# Patient Record
Sex: MALE | Race: WHITE | NOT HISPANIC OR LATINO | Employment: OTHER | URBAN - METROPOLITAN AREA
[De-identification: names, ages, dates, MRNs, and addresses within clinical notes are randomized per-mention and may not be internally consistent; named-entity substitution may affect disease eponyms.]

---

## 2017-04-28 ENCOUNTER — OFFICE VISIT (OUTPATIENT)
Dept: FAMILY MEDICINE CLINIC | Facility: CLINIC | Age: 67
End: 2017-04-28

## 2017-04-28 VITALS
OXYGEN SATURATION: 98 % | BODY MASS INDEX: 29.26 KG/M2 | TEMPERATURE: 99 F | WEIGHT: 216 LBS | HEIGHT: 72 IN | HEART RATE: 83 BPM | DIASTOLIC BLOOD PRESSURE: 90 MMHG | RESPIRATION RATE: 18 BRPM | SYSTOLIC BLOOD PRESSURE: 142 MMHG

## 2017-04-28 DIAGNOSIS — N20.1 URETEROLITHIASIS: ICD-10-CM

## 2017-04-28 DIAGNOSIS — N40.1 BENIGN NODULAR PROSTATIC HYPERPLASIA WITH LOWER URINARY TRACT SYMPTOMS: ICD-10-CM

## 2017-04-28 DIAGNOSIS — I10 ESSENTIAL HYPERTENSION: Primary | ICD-10-CM

## 2017-04-28 DIAGNOSIS — M15.9 OSTEOARTHRITIS OF MULTIPLE JOINTS, UNSPECIFIED OSTEOARTHRITIS TYPE: ICD-10-CM

## 2017-04-28 DIAGNOSIS — E78.5 DYSLIPIDEMIA: ICD-10-CM

## 2017-04-28 DIAGNOSIS — Z00.00 MEDICARE ANNUAL WELLNESS VISIT, INITIAL: ICD-10-CM

## 2017-04-28 PROBLEM — N40.0 BENIGN NODULAR PROSTATIC HYPERPLASIA: Status: ACTIVE | Noted: 2017-04-28

## 2017-04-28 PROCEDURE — 99214 OFFICE O/P EST MOD 30 MIN: CPT | Performed by: INTERNAL MEDICINE

## 2017-04-28 PROCEDURE — G0402 INITIAL PREVENTIVE EXAM: HCPCS | Performed by: INTERNAL MEDICINE

## 2017-04-28 RX ORDER — LISINOPRIL 20 MG/1
20 TABLET ORAL DAILY
Qty: 30 TABLET | Refills: 3 | Status: SHIPPED | OUTPATIENT
Start: 2017-04-28 | End: 2017-05-18 | Stop reason: SDUPTHER

## 2017-04-28 NOTE — PATIENT INSTRUCTIONS
"DASH Eating Plan  DASH stands for \"Dietary Approaches to Stop Hypertension.\" The DASH eating plan is a healthy eating plan that has been shown to reduce high blood pressure (hypertension). Additional health benefits may include reducing the risk of type 2 diabetes mellitus, heart disease, and stroke. The DASH eating plan may also help with weight loss.  WHAT DO I NEED TO KNOW ABOUT THE DASH EATING PLAN?  For the DASH eating plan, you will follow these general guidelines:  · Choose foods with a percent daily value for sodium of less than 5% (as listed on the food label).  · Use salt-free seasonings or herbs instead of table salt or sea salt.  · Check with your health care provider or pharmacist before using salt substitutes.  · Eat lower-sodium products, often labeled as \"lower sodium\" or \"no salt added.\"  · Eat fresh foods.  · Eat more vegetables, fruits, and low-fat dairy products.  · Choose whole grains. Look for the word \"whole\" as the first word in the ingredient list.  · Choose fish and skinless chicken or turkey more often than red meat. Limit fish, poultry, and meat to 6 oz (170 g) each day.  · Limit sweets, desserts, sugars, and sugary drinks.  · Choose heart-healthy fats.  · Limit cheese to 1 oz (28 g) per day.  · Eat more home-cooked food and less restaurant, buffet, and fast food.  · Limit fried foods.  · Cook foods using methods other than frying.  · Limit canned vegetables. If you do use them, rinse them well to decrease the sodium.  · When eating at a restaurant, ask that your food be prepared with less salt, or no salt if possible.  WHAT FOODS CAN I EAT?  Seek help from a dietitian for individual calorie needs.  Grains  Whole grain or whole wheat bread. Brown rice. Whole grain or whole wheat pasta. Quinoa, bulgur, and whole grain cereals. Low-sodium cereals. Corn or whole wheat flour tortillas. Whole grain cornbread. Whole grain crackers. Low-sodium crackers.  Vegetables  Fresh or frozen vegetables " (raw, steamed, roasted, or grilled). Low-sodium or reduced-sodium tomato and vegetable juices. Low-sodium or reduced-sodium tomato sauce and paste. Low-sodium or reduced-sodium canned vegetables.   Fruits  All fresh, canned (in natural juice), or frozen fruits.  Meat and Other Protein Products  Ground beef (85% or leaner), grass-fed beef, or beef trimmed of fat. Skinless chicken or turkey. Ground chicken or turkey. Pork trimmed of fat. All fish and seafood. Eggs. Dried beans, peas, or lentils. Unsalted nuts and seeds. Unsalted canned beans.  Dairy  Low-fat dairy products, such as skim or 1% milk, 2% or reduced-fat cheeses, low-fat ricotta or cottage cheese, or plain low-fat yogurt. Low-sodium or reduced-sodium cheeses.  Fats and Oils  Tub margarines without trans fats. Light or reduced-fat mayonnaise and salad dressings (reduced sodium). Avocado. Safflower, olive, or canola oils. Natural peanut or almond butter.  Other  Unsalted popcorn and pretzels.  The items listed above may not be a complete list of recommended foods or beverages. Contact your dietitian for more options.  WHAT FOODS ARE NOT RECOMMENDED?  Grains  White bread. White pasta. White rice. Refined cornbread. Bagels and croissants. Crackers that contain trans fat.  Vegetables  Creamed or fried vegetables. Vegetables in a cheese sauce. Regular canned vegetables. Regular canned tomato sauce and paste. Regular tomato and vegetable juices.  Fruits  Dried fruits. Canned fruit in light or heavy syrup. Fruit juice.  Meat and Other Protein Products  Fatty cuts of meat. Ribs, chicken wings, jurado, sausage, bologna, salami, chitterlings, fatback, hot dogs, bratwurst, and packaged luncheon meats. Salted nuts and seeds. Canned beans with salt.  Dairy  Whole or 2% milk, cream, half-and-half, and cream cheese. Whole-fat or sweetened yogurt. Full-fat cheeses or blue cheese. Nondairy creamers and whipped toppings. Processed cheese, cheese spreads, or cheese  curds.  Condiments  Onion and garlic salt, seasoned salt, table salt, and sea salt. Canned and packaged gravies. Worcestershire sauce. Tartar sauce. Barbecue sauce. Teriyaki sauce. Soy sauce, including reduced sodium. Steak sauce. Fish sauce. Oyster sauce. Cocktail sauce. Horseradish. Ketchup and mustard. Meat flavorings and tenderizers. Bouillon cubes. Hot sauce. Tabasco sauce. Marinades. Taco seasonings. Relishes.  Fats and Oils  Butter, stick margarine, lard, shortening, ghee, and jurado fat. Coconut, palm kernel, or palm oils. Regular salad dressings.  Other  Pickles and olives. Salted popcorn and pretzels.  The items listed above may not be a complete list of foods and beverages to avoid. Contact your dietitian for more information.  WHERE CAN I FIND MORE INFORMATION?  [employer]: www.nhlbi.nih.gov/health/health-topics/topics/dash/     This information is not intended to replace advice given to you by your health care provider. Make sure you discuss any questions you have with your health care provider.     Document Released: 2012 Document Revised: 2016 Document Reviewed: 10/22/2014  [employer] Interactive Patient Education © [employer]    Medicare Wellness  Personal Prevention Plan of Service     Date of Office Visit:  2017  Encounter Provider:  Smith Cabello MD  Place of Service:  [facility]  Patient Name: Jose Luis Davis  :  1950    As part of the Medicare Wellness portion of your visit today, we are providing you with this personalized preventive plan of services (PPPS). This plan is based upon recommendations of the [place] Preventive Services Task Force (USPSTF) and the Advisory Committee on Immunization Practices (ACIP).    This lists the preventive care services that should be considered, and provides dates of when you are due. Items listed as completed are up-to-date and do not require any  further intervention.    Health Maintenance   Topic Date Due   • HEPATITIS C SCREENING  04/28/2017   • AAA SCREEN (ONE-TIME)  04/28/2017   • PNEUMOCOCCAL VACCINES (65+ LOW/MEDIUM RISK) (2 of 2 - PPSV23) 04/28/2018   • COLONOSCOPY  04/28/2027   • TDAP/TD VACCINES (2 - Td) 04/28/2027   • INFLUENZA VACCINE  Completed   • ZOSTER VACCINE  Addressed       No orders of the defined types were placed in this encounter.      No Follow-up on file.

## 2017-04-28 NOTE — PROGRESS NOTES
Subjective   Jose Luis Davis is a 66 y.o. male.     History of Present Illness   Patient was then for Medicare annual wellness visit.  He also had trouble with his blood pressure and it's been running high and his urologist office.  He was getting 150-140/90.  His been going on for about 2 months.  He was given a DASH diet  Low impact exercise 30 minutes 3-4 times a week.  He will check his blood pressure and return to our clinic in 1 month.  He was also placed on lisinopril.    Much of this encounter note is an electronic transcription/translation of spoken language to printed text.  The electronic translation of spoken language may permit erroneous, or at times, nonsensical words or phrases to be inadvertently transcribed.  Although I have reviewed the note for such errors, some may still exist.  The following portions of the patient's history were reviewed and updated as appropriate: allergies, current medications, past family history, past medical history, past social history, past surgical history and problem list.    Review of Systems   Constitutional: Negative for fatigue and fever.   HENT: Positive for congestion. Negative for trouble swallowing.    Eyes: Negative for discharge and visual disturbance.   Respiratory: Negative for choking and shortness of breath.    Cardiovascular: Negative for chest pain and palpitations.   Gastrointestinal: Negative for abdominal pain and blood in stool.   Endocrine: Negative.    Genitourinary: Negative for genital sores and hematuria.   Musculoskeletal: Negative for gait problem and joint swelling.   Skin: Negative for color change, pallor, rash and wound.   Allergic/Immunologic: Positive for environmental allergies. Negative for immunocompromised state.   Neurological: Negative for facial asymmetry and speech difficulty.   Psychiatric/Behavioral: Negative for hallucinations and suicidal ideas.       Objective   Physical Exam   Constitutional: He is oriented to person,  place, and time. He appears well-developed and well-nourished. No distress.   HENT:   Head: Normocephalic and atraumatic.   Right Ear: External ear normal.   Left Ear: External ear normal.   Nose: Nose normal.   Mouth/Throat: Oropharynx is clear and moist. No oropharyngeal exudate.   Eyes: Conjunctivae and EOM are normal. Pupils are equal, round, and reactive to light. Right eye exhibits no discharge. Left eye exhibits no discharge. No scleral icterus.   Neck: Normal range of motion. Neck supple. No JVD present. No tracheal deviation present. No thyromegaly present.   Cardiovascular: Normal rate, regular rhythm and normal heart sounds.  Exam reveals no gallop and no friction rub.    No murmur heard.  Pulmonary/Chest: Effort normal and breath sounds normal. No stridor. No respiratory distress. He has no wheezes. He has no rales. He exhibits no tenderness.   Abdominal: Soft. Bowel sounds are normal. He exhibits no distension and no mass. There is no tenderness. There is no rebound and no guarding. No hernia.   Musculoskeletal: Normal range of motion. He exhibits no edema, tenderness or deformity.   Lymphadenopathy:     He has no cervical adenopathy.   Neurological: He is alert and oriented to person, place, and time. He displays normal reflexes. No cranial nerve deficit. He exhibits normal muscle tone. Coordination normal.   Skin: Skin is warm and dry. No rash noted. He is not diaphoretic. No erythema. No pallor.   Psychiatric: He has a normal mood and affect. His behavior is normal. Judgment and thought content normal.   Nursing note and vitals reviewed.      Assessment/Plan   Problems Addressed this Visit        Cardiovascular and Mediastinum    Essential hypertension - Primary    Relevant Medications    lisinopril (PRINIVIL,ZESTRIL) 20 MG tablet    Other Relevant Orders    CBC & Differential    Comprehensive Metabolic Panel    Lipid Panel    PSA       Musculoskeletal and Integument    Osteoarthritis of multiple  joints    Relevant Orders    CBC & Differential    Comprehensive Metabolic Panel    Lipid Panel    PSA       Genitourinary    Ureterolithiasis    Relevant Orders    CBC & Differential    Comprehensive Metabolic Panel    Lipid Panel    PSA    Benign nodular prostatic hyperplasia    Relevant Orders    CBC & Differential    Comprehensive Metabolic Panel    Lipid Panel    PSA       Other    Dyslipidemia    Relevant Orders    CBC & Differential    Comprehensive Metabolic Panel    Lipid Panel    PSA      Other Visit Diagnoses     Medicare annual wellness visit, initial

## 2017-04-28 NOTE — PROGRESS NOTES
QUICK REFERENCE INFORMATION:  The ABCs of the Annual Wellness Visit    Initial Medicare Wellness Visit    HEALTH RISK ASSESSMENT    1950    Recent Hospitalizations:  No recent hospitalization(s)..        Current Medical Providers:  Patient Care Team:  No Known Provider as PCP - General  Nathan Freeman Jr., MD as Consulting Physician (Urology)        Smoking Status:  History   Smoking Status   • Former Smoker   Smokeless Tobacco   • Not on file     Comment: quit about 30 yrs ago       Alcohol Consumption:  History   Alcohol Use   • 3.6 oz/week   • 6 Cans of beer per week       Depression Screen:   PHQ-9 Depression Screening 4/28/2017   Little interest or pleasure in doing things 0   Feeling down, depressed, or hopeless 0   Trouble falling or staying asleep, or sleeping too much 0   Feeling tired or having little energy 0   Poor appetite or overeating 0   Feeling bad about yourself - or that you are a failure or have let yourself or your family down 0   Trouble concentrating on things, such as reading the newspaper or watching television 0   Moving or speaking so slowly that other people could have noticed. Or the opposite - being so fidgety or restless that you have been moving around a lot more than usual 0   Thoughts that you would be better off dead, or of hurting yourself in some way 0   PHQ-9 Total Score 0   If you checked off any problems, how difficult have these problems made it for you to do your work, take care of things at home, or get along with other people? Not difficult at all       Health Habits and Functional and Cognitive Screening:  Functional & Cognitive Status 4/28/2017   Do you have difficulty preparing food and eating? No   Do you have difficulty bathing yourself? No   Do you have difficulty getting dressed? No   Do you have difficulty using the toilet? No   Do you have difficulty moving around from place to place? No   In the past year have you fallen or experienced a near fall? No   Do  you need help using the phone?  No   Are you deaf or do you have serious difficulty hearing?  No   Do you need help with transportation? No   Do you need help shopping? No   Do you need help preparing meals?  No   Do you need help with housework?  No   Do you need help with laundry? No   Do you need help taking your medications? No   Do you need help managing money? No   Do you have difficulty concentrating, remembering or making decisions? No       Health Habits  Current Diet: Well Balanced Diet  Dental Exam: Up to date  Eye Exam: Up to date  Exercise (times per week): 3 times per week  Current Exercise Activities Include: Light Weight/Kettebells          Does the patient have evidence of cognitive impairment? No    Asiprin use counseling: Does not need ASA (and currently is not on it)      Recent Lab Results:    Visual Acuity:  No exam data present    Age-appropriate Screening Schedule:  Refer to the list below for future screening recommendations based on patient's age, sex and/or medical conditions. Orders for these recommended tests are listed in the plan section. The patient has been provided with a written plan.    Health Maintenance   Topic Date Due   • PNEUMOCOCCAL VACCINES (65+ LOW/MEDIUM RISK) (2 of 2 - PPSV23) 04/28/2018   • COLONOSCOPY  04/28/2027   • TDAP/TD VACCINES (2 - Td) 04/28/2027   • INFLUENZA VACCINE  Completed   • ZOSTER VACCINE  Addressed        Subjective   History of Present Illness    Jose Luis Davis is a 66 y.o. male who presents for an Annual Wellness Visit.    The following portions of the patient's history were reviewed and updated as appropriate: allergies, current medications, past family history, past medical history, past social history, past surgical history and problem list.    Outpatient Medications Prior to Visit   Medication Sig Dispense Refill   • Multiple Vitamins-Minerals (MULTIVITAMIN ADULT PO) Take 1 tablet/day by mouth.     • saw palmetto 80 MG capsule Take 80 mg by  "mouth daily.     • HYDROcodone-acetaminophen (NORCO) 5-325 MG per tablet Take 1 tablet by mouth every 6 (six) hours as needed.     • tamsulosin (FLOMAX) 0.4 MG capsule 24 hr capsule Take 1 capsule by mouth every night. 30 capsule 0     No facility-administered medications prior to visit.        Patient Active Problem List   Diagnosis   • Ureterolithiasis   • Benign nodular prostatic hyperplasia   • Essential hypertension   • Dyslipidemia   • Osteoarthritis of multiple joints       Advance Care Planning:  has an advance directive - a copy HAS NOT been provided    Identification of Risk Factors:  Risk factors include:     Review of Systems    Compared to one year ago, the patient feels his physical health is the same.  Compared to one year ago, the patient feels his mental health is the same.    Objective     Physical Exam    Vitals:    04/28/17 1352   BP: 142/90   BP Location: Left arm   Patient Position: Sitting   Cuff Size: Large Adult   Pulse: 83   Resp: 18   Temp: 99 °F (37.2 °C)   TempSrc: Oral   SpO2: 98%   Weight: 216 lb (98 kg)   Height: 72\" (182.9 cm)   PainSc: 0-No pain       Body mass index is 29.29 kg/(m^2).  Discussed the patient's BMI with him. The BMI is in the acceptable range.    Assessment/Plan   Patient Self-Management and Personalized Health Advice  The patient has been provided with information about: NA and preventive services including:   · NA.    Visit Diagnoses:    ICD-10-CM ICD-9-CM   1. Medicare annual wellness visit, initial Z00.00 V70.0   2. Ureterolithiasis N20.1 592.1   3. Benign nodular prostatic hyperplasia with lower urinary tract symptoms N40.1 600.10   4. Essential hypertension I10 401.9   5. Dyslipidemia E78.5 272.4   6. Osteoarthritis of multiple joints, unspecified osteoarthritis type M15.9 715.89       Orders Placed This Encounter   Procedures   • Comprehensive Metabolic Panel   • Lipid Panel   • PSA       Outpatient Encounter Prescriptions as of 4/28/2017   Medication Sig " Dispense Refill   • Multiple Vitamins-Minerals (MULTIVITAMIN ADULT PO) Take 1 tablet/day by mouth.     • saw palmetto 80 MG capsule Take 80 mg by mouth daily.     • HYDROcodone-acetaminophen (NORCO) 5-325 MG per tablet Take 1 tablet by mouth every 6 (six) hours as needed.     • lisinopril (PRINIVIL,ZESTRIL) 20 MG tablet Take 1 tablet by mouth Daily. 30 tablet 3   • tamsulosin (FLOMAX) 0.4 MG capsule 24 hr capsule Take 1 capsule by mouth every night. 30 capsule 0     No facility-administered encounter medications on file as of 4/28/2017.        Reviewed use of high risk medication in the elderly: not applicable  Reviewed for potential of harmful drug interactions in the elderly: not applicable    Follow Up:  No Follow-up on file.     An After Visit Summary and PPPS with all of these plans were given to the patient.

## 2017-05-18 RX ORDER — LISINOPRIL 20 MG/1
20 TABLET ORAL DAILY
Qty: 30 TABLET | Refills: 3 | Status: SHIPPED | OUTPATIENT
Start: 2017-05-18 | End: 2017-08-08 | Stop reason: SDUPTHER

## 2017-05-26 ENCOUNTER — OFFICE VISIT (OUTPATIENT)
Dept: FAMILY MEDICINE CLINIC | Facility: CLINIC | Age: 67
End: 2017-05-26

## 2017-05-26 VITALS
TEMPERATURE: 98 F | SYSTOLIC BLOOD PRESSURE: 122 MMHG | HEIGHT: 72 IN | BODY MASS INDEX: 28.44 KG/M2 | WEIGHT: 210 LBS | OXYGEN SATURATION: 99 % | RESPIRATION RATE: 15 BRPM | DIASTOLIC BLOOD PRESSURE: 80 MMHG | HEART RATE: 90 BPM

## 2017-05-26 DIAGNOSIS — I10 ESSENTIAL HYPERTENSION: Primary | ICD-10-CM

## 2017-05-26 DIAGNOSIS — E78.5 DYSLIPIDEMIA: ICD-10-CM

## 2017-05-26 DIAGNOSIS — M15.9 OSTEOARTHRITIS OF MULTIPLE JOINTS, UNSPECIFIED OSTEOARTHRITIS TYPE: ICD-10-CM

## 2017-05-26 LAB
ALBUMIN SERPL-MCNC: 4.8 G/DL (ref 3.5–5.2)
ALBUMIN/GLOB SERPL: 1.3 G/DL
ALP SERPL-CCNC: 94 U/L (ref 39–117)
ALT SERPL W P-5'-P-CCNC: 26 U/L (ref 1–41)
ANION GAP SERPL CALCULATED.3IONS-SCNC: 13.8 MMOL/L
AST SERPL-CCNC: 15 U/L (ref 1–40)
BASOPHILS # BLD AUTO: 0.01 10*3/MM3 (ref 0–0.2)
BASOPHILS NFR BLD AUTO: 0.2 % (ref 0–1.5)
BILIRUB SERPL-MCNC: 0.5 MG/DL (ref 0.1–1.2)
BUN BLD-MCNC: 20 MG/DL (ref 8–23)
BUN/CREAT SERPL: 15.3 (ref 7–25)
CALCIUM SPEC-SCNC: 10.6 MG/DL (ref 8.6–10.5)
CHLORIDE SERPL-SCNC: 99 MMOL/L (ref 98–107)
CHOLEST SERPL-MCNC: 198 MG/DL (ref 0–200)
CO2 SERPL-SCNC: 24.2 MMOL/L (ref 22–29)
CREAT BLD-MCNC: 1.31 MG/DL (ref 0.76–1.27)
DEPRECATED RDW RBC AUTO: 43.1 FL (ref 37–54)
EOSINOPHIL # BLD AUTO: 0.05 10*3/MM3 (ref 0–0.7)
EOSINOPHIL NFR BLD AUTO: 0.8 % (ref 0.3–6.2)
ERYTHROCYTE [DISTWIDTH] IN BLOOD BY AUTOMATED COUNT: 12.6 % (ref 11.5–14.5)
GFR SERPL CREATININE-BSD FRML MDRD: 55 ML/MIN/1.73
GLOBULIN UR ELPH-MCNC: 3.7 GM/DL
GLUCOSE BLD-MCNC: 101 MG/DL (ref 65–99)
HCT VFR BLD AUTO: 42.5 % (ref 40.4–52.2)
HDLC SERPL-MCNC: 45 MG/DL (ref 40–60)
HGB BLD-MCNC: 14.8 G/DL (ref 13.7–17.6)
IMM GRANULOCYTES # BLD: 0 10*3/MM3 (ref 0–0.03)
IMM GRANULOCYTES NFR BLD: 0 % (ref 0–0.5)
LDLC SERPL CALC-MCNC: 135 MG/DL (ref 0–100)
LDLC/HDLC SERPL: 3 {RATIO}
LYMPHOCYTES # BLD AUTO: 2.27 10*3/MM3 (ref 0.9–4.8)
LYMPHOCYTES NFR BLD AUTO: 34.7 % (ref 19.6–45.3)
MCH RBC QN AUTO: 32.8 PG (ref 27–32.7)
MCHC RBC AUTO-ENTMCNC: 34.8 G/DL (ref 32.6–36.4)
MCV RBC AUTO: 94.2 FL (ref 79.8–96.2)
MONOCYTES # BLD AUTO: 0.43 10*3/MM3 (ref 0.2–1.2)
MONOCYTES NFR BLD AUTO: 6.6 % (ref 5–12)
NEUTROPHILS # BLD AUTO: 3.78 10*3/MM3 (ref 1.9–8.1)
NEUTROPHILS NFR BLD AUTO: 57.7 % (ref 42.7–76)
PLATELET # BLD AUTO: 299 10*3/MM3 (ref 140–500)
PMV BLD AUTO: 10.8 FL (ref 6–12)
POTASSIUM BLD-SCNC: 5.4 MMOL/L (ref 3.5–5.2)
PROT SERPL-MCNC: 8.5 G/DL (ref 6–8.5)
PSA SERPL-MCNC: 4.06 NG/ML (ref 0–4)
RBC # BLD AUTO: 4.51 10*6/MM3 (ref 4.6–6)
SODIUM BLD-SCNC: 137 MMOL/L (ref 136–145)
TRIGL SERPL-MCNC: 91 MG/DL (ref 0–150)
VLDLC SERPL-MCNC: 18.2 MG/DL (ref 5–40)
WBC NRBC COR # BLD: 6.54 10*3/MM3 (ref 4.5–10.7)

## 2017-05-26 PROCEDURE — 99214 OFFICE O/P EST MOD 30 MIN: CPT | Performed by: INTERNAL MEDICINE

## 2017-05-26 PROCEDURE — 84153 ASSAY OF PSA TOTAL: CPT | Performed by: INTERNAL MEDICINE

## 2017-05-26 PROCEDURE — 80053 COMPREHEN METABOLIC PANEL: CPT | Performed by: INTERNAL MEDICINE

## 2017-05-26 PROCEDURE — 85025 COMPLETE CBC W/AUTO DIFF WBC: CPT | Performed by: INTERNAL MEDICINE

## 2017-05-26 PROCEDURE — 80061 LIPID PANEL: CPT | Performed by: INTERNAL MEDICINE

## 2017-08-08 RX ORDER — LISINOPRIL 20 MG/1
20 TABLET ORAL DAILY
Qty: 30 TABLET | Refills: 3 | Status: SHIPPED | OUTPATIENT
Start: 2017-08-08 | End: 2017-11-28 | Stop reason: SDUPTHER

## 2017-10-10 ENCOUNTER — CLINICAL SUPPORT (OUTPATIENT)
Dept: FAMILY MEDICINE CLINIC | Facility: CLINIC | Age: 67
End: 2017-10-10

## 2017-10-10 PROCEDURE — 90662 IIV NO PRSV INCREASED AG IM: CPT | Performed by: INTERNAL MEDICINE

## 2017-10-10 PROCEDURE — G0008 ADMIN INFLUENZA VIRUS VAC: HCPCS | Performed by: INTERNAL MEDICINE

## 2017-11-28 ENCOUNTER — OFFICE VISIT (OUTPATIENT)
Dept: FAMILY MEDICINE CLINIC | Facility: CLINIC | Age: 67
End: 2017-11-28

## 2017-11-28 VITALS
HEART RATE: 77 BPM | BODY MASS INDEX: 28.47 KG/M2 | TEMPERATURE: 98.6 F | HEIGHT: 72 IN | OXYGEN SATURATION: 98 % | RESPIRATION RATE: 16 BRPM | SYSTOLIC BLOOD PRESSURE: 110 MMHG | WEIGHT: 210.2 LBS | DIASTOLIC BLOOD PRESSURE: 76 MMHG

## 2017-11-28 DIAGNOSIS — I10 ESSENTIAL HYPERTENSION: Primary | ICD-10-CM

## 2017-11-28 DIAGNOSIS — M15.9 OSTEOARTHRITIS OF MULTIPLE JOINTS, UNSPECIFIED OSTEOARTHRITIS TYPE: ICD-10-CM

## 2017-11-28 DIAGNOSIS — E78.5 DYSLIPIDEMIA: ICD-10-CM

## 2017-11-28 PROCEDURE — 99214 OFFICE O/P EST MOD 30 MIN: CPT | Performed by: INTERNAL MEDICINE

## 2017-11-28 RX ORDER — LISINOPRIL 20 MG/1
20 TABLET ORAL DAILY
Qty: 90 TABLET | Refills: 2 | Status: SHIPPED | OUTPATIENT
Start: 2017-11-28 | End: 2018-05-29 | Stop reason: SDUPTHER

## 2017-11-28 NOTE — PROGRESS NOTES
Subjective   Jose Luis Davis is a 67 y.o. male.     History of Present Illness   She being seen for hypertension.  Blood pressures been running 110s over 80s at home.  His continue his DASH diet and low impact exercise and follow-up in 6 months.  His osteoarthritis been well-controlled with over-the-counter medication.  His dyslipidemia also being controlled with diet and exercise.    Much of this encounter note is an electronic transcription/translation of spoken language to printed text.  The electronic translation of spoken language may permit erroneous, or at times, nonsensical words or phrases to be inadvertently transcribed.  Although I have reviewed the note for such errors, some may still exist.  The following portions of the patient's history were reviewed and updated as appropriate: allergies, current medications, past family history, past medical history, past social history, past surgical history and problem list.    Review of Systems   Constitutional: Negative for fatigue and fever.   HENT: Positive for congestion. Negative for trouble swallowing.    Eyes: Negative for discharge and visual disturbance.   Respiratory: Negative for choking and shortness of breath.    Cardiovascular: Negative for chest pain and palpitations.   Gastrointestinal: Negative for abdominal pain and blood in stool.   Endocrine: Negative.    Genitourinary: Negative for genital sores and hematuria.   Musculoskeletal: Negative for gait problem and joint swelling.   Skin: Negative for color change, pallor, rash and wound.   Allergic/Immunologic: Positive for environmental allergies. Negative for immunocompromised state.   Neurological: Negative for facial asymmetry and speech difficulty.   Psychiatric/Behavioral: Negative for hallucinations and suicidal ideas.       Objective   Physical Exam   Constitutional: He is oriented to person, place, and time. He appears well-developed and well-nourished.   HENT:   Head: Normocephalic.    Eyes: Conjunctivae are normal. Pupils are equal, round, and reactive to light.   Neck: Normal range of motion. Neck supple.   Cardiovascular: Normal rate, regular rhythm and normal heart sounds.    Pulmonary/Chest: Effort normal and breath sounds normal.   Abdominal: Soft. Bowel sounds are normal.   Musculoskeletal: Normal range of motion.   Neurological: He is alert and oriented to person, place, and time.   Skin: Skin is warm and dry.   Psychiatric: He has a normal mood and affect. His behavior is normal. Judgment and thought content normal.   Nursing note and vitals reviewed.      Assessment/Plan   Problems Addressed this Visit        Cardiovascular and Mediastinum    Essential hypertension - Primary    Relevant Medications    lisinopril (PRINIVIL,ZESTRIL) 20 MG tablet    Other Relevant Orders    CBC & Differential    Comprehensive Metabolic Panel    Lipid Panel       Musculoskeletal and Integument    Osteoarthritis of multiple joints       Other    Dyslipidemia

## 2018-05-22 ENCOUNTER — LAB (OUTPATIENT)
Dept: FAMILY MEDICINE CLINIC | Facility: CLINIC | Age: 68
End: 2018-05-22

## 2018-05-22 DIAGNOSIS — I10 ESSENTIAL HYPERTENSION: ICD-10-CM

## 2018-05-22 LAB
ALBUMIN SERPL-MCNC: 4.5 G/DL (ref 3.5–5.2)
ALBUMIN/GLOB SERPL: 1.8 G/DL
ALP SERPL-CCNC: 71 U/L (ref 39–117)
ALT SERPL W P-5'-P-CCNC: 17 U/L (ref 1–41)
ANION GAP SERPL CALCULATED.3IONS-SCNC: 15.1 MMOL/L
AST SERPL-CCNC: 11 U/L (ref 1–40)
BILIRUB SERPL-MCNC: 0.7 MG/DL (ref 0.1–1.2)
BUN BLD-MCNC: 23 MG/DL (ref 8–23)
BUN/CREAT SERPL: 20 (ref 7–25)
CALCIUM SPEC-SCNC: 9.8 MG/DL (ref 8.6–10.5)
CHLORIDE SERPL-SCNC: 101 MMOL/L (ref 98–107)
CHOLEST SERPL-MCNC: 212 MG/DL (ref 0–200)
CO2 SERPL-SCNC: 23.9 MMOL/L (ref 22–29)
CREAT BLD-MCNC: 1.15 MG/DL (ref 0.76–1.27)
ERYTHROCYTE [DISTWIDTH] IN BLOOD BY AUTOMATED COUNT: 12.9 % (ref 4.5–15)
GFR SERPL CREATININE-BSD FRML MDRD: 64 ML/MIN/1.73
GLOBULIN UR ELPH-MCNC: 2.5 GM/DL
GLUCOSE BLD-MCNC: 103 MG/DL (ref 65–99)
HCT VFR BLD AUTO: 43.4 % (ref 35–60)
HDLC SERPL-MCNC: 39 MG/DL (ref 40–60)
HGB BLD-MCNC: 14.8 G/DL (ref 13.5–18)
LDLC SERPL CALC-MCNC: 151 MG/DL (ref 0–100)
LDLC/HDLC SERPL: 3.87 {RATIO}
LYMPHOCYTES # BLD AUTO: 3.7 10*3/MM3 (ref 1.2–3.4)
LYMPHOCYTES NFR BLD AUTO: 45.1 % (ref 21–51)
MCH RBC QN AUTO: 31.9 PG (ref 26.1–33.1)
MCHC RBC AUTO-ENTMCNC: 34 G/DL (ref 33–37)
MCV RBC AUTO: 93.8 FL (ref 80–99)
MONOCYTES # BLD AUTO: 0.6 10*3/MM3 (ref 0.1–0.6)
MONOCYTES NFR BLD AUTO: 7.3 % (ref 2–9)
NEUTROPHILS # BLD AUTO: 3.9 10*3/MM3 (ref 1.4–6.5)
NEUTROPHILS NFR BLD AUTO: 47.6 % (ref 42–75)
PLATELET # BLD AUTO: 298 10*3/MM3 (ref 150–450)
PMV BLD AUTO: 7.9 FL (ref 7.1–10.5)
POTASSIUM BLD-SCNC: 4.7 MMOL/L (ref 3.5–5.2)
PROT SERPL-MCNC: 7 G/DL (ref 6–8.5)
RBC # BLD AUTO: 4.63 10*6/MM3 (ref 4–6)
SODIUM BLD-SCNC: 140 MMOL/L (ref 136–145)
TRIGL SERPL-MCNC: 111 MG/DL (ref 0–150)
VLDLC SERPL-MCNC: 22.2 MG/DL (ref 5–40)
WBC NRBC COR # BLD: 8.1 10*3/MM3 (ref 4.5–10)

## 2018-05-22 PROCEDURE — 80053 COMPREHEN METABOLIC PANEL: CPT | Performed by: INTERNAL MEDICINE

## 2018-05-22 PROCEDURE — 36415 COLL VENOUS BLD VENIPUNCTURE: CPT | Performed by: INTERNAL MEDICINE

## 2018-05-22 PROCEDURE — 80061 LIPID PANEL: CPT | Performed by: INTERNAL MEDICINE

## 2018-05-22 PROCEDURE — 85025 COMPLETE CBC W/AUTO DIFF WBC: CPT | Performed by: INTERNAL MEDICINE

## 2018-05-29 ENCOUNTER — OFFICE VISIT (OUTPATIENT)
Dept: FAMILY MEDICINE CLINIC | Facility: CLINIC | Age: 68
End: 2018-05-29

## 2018-05-29 VITALS
TEMPERATURE: 98.3 F | HEART RATE: 80 BPM | RESPIRATION RATE: 15 BRPM | HEIGHT: 72 IN | OXYGEN SATURATION: 98 % | SYSTOLIC BLOOD PRESSURE: 122 MMHG | WEIGHT: 213 LBS | BODY MASS INDEX: 28.85 KG/M2 | DIASTOLIC BLOOD PRESSURE: 78 MMHG

## 2018-05-29 DIAGNOSIS — I10 ESSENTIAL HYPERTENSION: ICD-10-CM

## 2018-05-29 DIAGNOSIS — G44.209 TENSION-TYPE HEADACHE, NOT INTRACTABLE, UNSPECIFIED CHRONICITY PATTERN: ICD-10-CM

## 2018-05-29 DIAGNOSIS — M15.9 OSTEOARTHRITIS OF MULTIPLE JOINTS, UNSPECIFIED OSTEOARTHRITIS TYPE: ICD-10-CM

## 2018-05-29 DIAGNOSIS — I79.8 OTHER DISORDERS OF ARTERIES, ARTERIOLES AND CAPILLARIES IN DISEASES CLASSIFIED ELSEWHERE (HCC): ICD-10-CM

## 2018-05-29 DIAGNOSIS — Z00.00 MEDICARE ANNUAL WELLNESS VISIT, INITIAL: Primary | ICD-10-CM

## 2018-05-29 DIAGNOSIS — E55.9 VITAMIN D DEFICIENCY: ICD-10-CM

## 2018-05-29 DIAGNOSIS — E78.5 DYSLIPIDEMIA: ICD-10-CM

## 2018-05-29 PROCEDURE — 99214 OFFICE O/P EST MOD 30 MIN: CPT | Performed by: INTERNAL MEDICINE

## 2018-05-29 PROCEDURE — G0438 PPPS, INITIAL VISIT: HCPCS | Performed by: INTERNAL MEDICINE

## 2018-05-29 RX ORDER — LISINOPRIL 20 MG/1
20 TABLET ORAL DAILY
Qty: 90 TABLET | Refills: 2 | Status: SHIPPED | OUTPATIENT
Start: 2018-05-29 | End: 2018-05-29

## 2018-05-29 RX ORDER — MAG HYDROX/ALUMINUM HYD/SIMETH 400-400-40
SUSPENSION, ORAL (FINAL DOSE FORM) ORAL 2 TIMES DAILY
COMMUNITY

## 2018-05-29 RX ORDER — ASPIRIN 81 MG/1
81 TABLET ORAL DAILY
Qty: 30 TABLET | Refills: 3
Start: 2018-05-29 | End: 2019-12-05

## 2018-05-29 RX ORDER — ROSUVASTATIN CALCIUM 20 MG/1
20 TABLET, COATED ORAL DAILY
Qty: 90 TABLET | Refills: 1 | Status: SHIPPED | OUTPATIENT
Start: 2018-05-29 | End: 2018-12-07 | Stop reason: SDUPTHER

## 2018-05-29 NOTE — PROGRESS NOTES
Subjective   Jose Luis Davis is a 67 y.o. male.     History of Present Illness   She was seen for Medicare wellness exam.  His blood pressures been ranging 110s over 70s at home.  Her osteoarthritis is been well-controlled with over-the-counter medication.  His lipid status showed an LDL of 150.  He was getting a unsteady feeling in his head.  He was unable to explain it totally but his blood pressure and pulse were all normal medications.  His sisters recently undergone carotid endarterectomy and he was scheduled for carotid Doppler with CT scan of the head.  He was placed on a baby aspirin restart Crestor 20 mg.  He is to have labs in one in 3 months.  He is to monitor his blood pressure return to our clinic in 2 weeks.    Dictated utilizing Dragon dictation. If there are questions or for further clarification, please contact me.   The following portions of the patient's history were reviewed and updated as appropriate: allergies, current medications, past family history, past medical history, past social history, past surgical history and problem list.    Review of Systems   Constitutional: Negative for fatigue and fever.   HENT: Positive for congestion. Negative for trouble swallowing.    Eyes: Negative for discharge and visual disturbance.   Respiratory: Negative for choking and shortness of breath.    Cardiovascular: Negative for chest pain and palpitations.   Gastrointestinal: Negative for abdominal pain and blood in stool.   Endocrine: Negative.    Genitourinary: Negative for genital sores and hematuria.   Musculoskeletal: Negative for gait problem and joint swelling.   Skin: Negative for color change, pallor, rash and wound.   Allergic/Immunologic: Positive for environmental allergies. Negative for immunocompromised state.   Neurological: Positive for light-headedness and headaches. Negative for facial asymmetry and speech difficulty.   Psychiatric/Behavioral: Negative for hallucinations and suicidal  ideas.       Objective   Physical Exam   Constitutional: He is oriented to person, place, and time. He appears well-developed and well-nourished.   HENT:   Head: Normocephalic.   Eyes: Conjunctivae are normal. Pupils are equal, round, and reactive to light.   Neck: Normal range of motion. Neck supple.   Cardiovascular: Normal rate, regular rhythm and normal heart sounds.  Exam reveals no gallop and no friction rub.    No murmur heard.  Pulmonary/Chest: Effort normal and breath sounds normal. No respiratory distress. He has no wheezes. He has no rales. He exhibits no tenderness.   Abdominal: Soft. Bowel sounds are normal.   Musculoskeletal: Normal range of motion. He exhibits no edema, tenderness or deformity.   Neurological: He is alert and oriented to person, place, and time. He displays normal reflexes. No cranial nerve deficit or sensory deficit. He exhibits normal muscle tone. Coordination normal.   Skin: Skin is warm and dry.   Psychiatric: He has a normal mood and affect. His behavior is normal. Judgment and thought content normal.   Nursing note and vitals reviewed.      Assessment/Plan   Problems Addressed this Visit        Cardiovascular and Mediastinum    Essential hypertension    Relevant Orders    CBC & Differential    Comprehensive Metabolic Panel    Lipid Panel    Vitamin D 25 Hydroxy    Hepatic Function Panel    Comprehensive Metabolic Panel    Lipid Panel       Musculoskeletal and Integument    Osteoarthritis of multiple joints    Relevant Orders    CBC & Differential    Comprehensive Metabolic Panel    Lipid Panel    Vitamin D 25 Hydroxy    Hepatic Function Panel    Comprehensive Metabolic Panel    Lipid Panel       Other    Dyslipidemia    Relevant Orders    CBC & Differential    Comprehensive Metabolic Panel    Lipid Panel    Vitamin D 25 Hydroxy    Hepatic Function Panel    Comprehensive Metabolic Panel    Lipid Panel      Other Visit Diagnoses     Medicare annual wellness visit, initial    -   Primary    Relevant Orders    Hepatic Function Panel    Comprehensive Metabolic Panel    Lipid Panel    Vitamin D deficiency         Relevant Orders    Vitamin D 25 Hydroxy    Hepatic Function Panel    Comprehensive Metabolic Panel    Lipid Panel    Tension-type headache, not intractable, unspecified chronicity pattern        Relevant Medications    aspirin 81 MG EC tablet    Other Relevant Orders    CT Head Without Contrast    Duplex Carotid Ultrasound CAR    Other disorders of arteries, arterioles and capillaries in diseases classified elsewhere         Relevant Orders    Duplex Carotid Ultrasound CAR

## 2018-05-29 NOTE — PATIENT INSTRUCTIONS
"DASH Eating Plan  DASH stands for \"Dietary Approaches to Stop Hypertension.\" The DASH eating plan is a healthy eating plan that has been shown to reduce high blood pressure (hypertension). It may also reduce your risk for type 2 diabetes, heart disease, and stroke. The DASH eating plan may also help with weight loss.  What are tips for following this plan?  General guidelines   · Avoid eating more than 2,300 mg (milligrams) of salt (sodium) a day. If you have hypertension, you may need to reduce your sodium intake to 1,500 mg a day.  · Limit alcohol intake to no more than 1 drink a day for nonpregnant women and 2 drinks a day for men. One drink equals 12 oz of beer, 5 oz of wine, or 1½ oz of hard liquor.  · Work with your health care provider to maintain a healthy body weight or to lose weight. Ask what an ideal weight is for you.  · Get at least 30 minutes of exercise that causes your heart to beat faster (aerobic exercise) most days of the week. Activities may include walking, swimming, or biking.  · Work with your health care provider or diet and nutrition specialist (dietitian) to adjust your eating plan to your individual calorie needs.  Reading food labels   · Check food labels for the amount of sodium per serving. Choose foods with less than 5 percent of the Daily Value of sodium. Generally, foods with less than 300 mg of sodium per serving fit into this eating plan.  · To find whole grains, look for the word \"whole\" as the first word in the ingredient list.  Shopping   · Buy products labeled as \"low-sodium\" or \"no salt added.\"  · Buy fresh foods. Avoid canned foods and premade or frozen meals.  Cooking   · Avoid adding salt when cooking. Use salt-free seasonings or herbs instead of table salt or sea salt. Check with your health care provider or pharmacist before using salt substitutes.  · Do not pacheco foods. Cook foods using healthy methods such as baking, boiling, grilling, and broiling instead.  · Cook with " heart-healthy oils, such as olive, canola, soybean, or sunflower oil.  Meal planning     · Eat a balanced diet that includes:  ¨ 5 or more servings of fruits and vegetables each day. At each meal, try to fill half of your plate with fruits and vegetables.  ¨ Up to 6-8 servings of whole grains each day.  ¨ Less than 6 oz of lean meat, poultry, or fish each day. A 3-oz serving of meat is about the same size as a deck of cards. One egg equals 1 oz.  ¨ 2 servings of low-fat dairy each day.  ¨ A serving of nuts, seeds, or beans 5 times each week.  ¨ Heart-healthy fats. Healthy fats called Omega-3 fatty acids are found in foods such as flaxseeds and coldwater fish, like sardines, salmon, and mackerel.  · Limit how much you eat of the following:  ¨ Canned or prepackaged foods.  ¨ Food that is high in trans fat, such as fried foods.  ¨ Food that is high in saturated fat, such as fatty meat.  ¨ Sweets, desserts, sugary drinks, and other foods with added sugar.  ¨ Full-fat dairy products.  · Do not salt foods before eating.  · Try to eat at least 2 vegetarian meals each week.  · Eat more home-cooked food and less restaurant, buffet, and fast food.  · When eating at a restaurant, ask that your food be prepared with less salt or no salt, if possible.  What foods are recommended?  The items listed may not be a complete list. Talk with your dietitian about what dietary choices are best for you.  Grains   Whole-grain or whole-wheat bread. Whole-grain or whole-wheat pasta. Brown rice. Oatmeal. Quinoa. Bulgur. Whole-grain and low-sodium cereals. Olga bread. Low-fat, low-sodium crackers. Whole-wheat flour tortillas.  Vegetables   Fresh or frozen vegetables (raw, steamed, roasted, or grilled). Low-sodium or reduced-sodium tomato and vegetable juice. Low-sodium or reduced-sodium tomato sauce and tomato paste. Low-sodium or reduced-sodium canned vegetables.  Fruits   All fresh, dried, or frozen fruit. Canned fruit in natural juice  (without added sugar).  Meat and other protein foods   Skinless chicken or turkey. Ground chicken or turkey. Pork with fat trimmed off. Fish and seafood. Egg whites. Dried beans, peas, or lentils. Unsalted nuts, nut butters, and seeds. Unsalted canned beans. Lean cuts of beef with fat trimmed off. Low-sodium, lean deli meat.  Dairy   Low-fat (1%) or fat-free (skim) milk. Fat-free, low-fat, or reduced-fat cheeses. Nonfat, low-sodium ricotta or cottage cheese. Low-fat or nonfat yogurt. Low-fat, low-sodium cheese.  Fats and oils   Soft margarine without trans fats. Vegetable oil. Low-fat, reduced-fat, or light mayonnaise and salad dressings (reduced-sodium). Canola, safflower, olive, soybean, and sunflower oils. Avocado.  Seasoning and other foods   Herbs. Spices. Seasoning mixes without salt. Unsalted popcorn and pretzels. Fat-free sweets.  What foods are not recommended?  The items listed may not be a complete list. Talk with your dietitian about what dietary choices are best for you.  Grains   Baked goods made with fat, such as croissants, muffins, or some breads. Dry pasta or rice meal packs.  Vegetables   Creamed or fried vegetables. Vegetables in a cheese sauce. Regular canned vegetables (not low-sodium or reduced-sodium). Regular canned tomato sauce and paste (not low-sodium or reduced-sodium). Regular tomato and vegetable juice (not low-sodium or reduced-sodium). Pickles. Olives.  Fruits   Canned fruit in a light or heavy syrup. Fried fruit. Fruit in cream or butter sauce.  Meat and other protein foods   Fatty cuts of meat. Ribs. Fried meat. Miguel. Sausage. Bologna and other processed lunch meats. Salami. Fatback. Hotdogs. Bratwurst. Salted nuts and seeds. Canned beans with added salt. Canned or smoked fish. Whole eggs or egg yolks. Chicken or turkey with skin.  Dairy   Whole or 2% milk, cream, and half-and-half. Whole or full-fat cream cheese. Whole-fat or sweetened yogurt. Full-fat cheese. Nondairy creamers.  Whipped toppings. Processed cheese and cheese spreads.  Fats and oils   Butter. Stick margarine. Lard. Shortening. Ghee. Miguel fat. Tropical oils, such as coconut, palm kernel, or palm oil.  Seasoning and other foods   Salted popcorn and pretzels. Onion salt, garlic salt, seasoned salt, table salt, and sea salt. Worcestershire sauce. Tartar sauce. Barbecue sauce. Teriyaki sauce. Soy sauce, including reduced-sodium. Steak sauce. Canned and packaged gravies. Fish sauce. Oyster sauce. Cocktail sauce. Horseradish that you find on the shelf. Ketchup. Mustard. Meat flavorings and tenderizers. Bouillon cubes. Hot sauce and Tabasco sauce. Premade or packaged marinades. Premade or packaged taco seasonings. Relishes. Regular salad dressings.  Where to find more information:  · National Heart, Lung, and Blood [place]: www.nhlbi.nih.gov  · American Heart Association: www.heart.org  Summary  · The DASH eating plan is a healthy eating plan that has been shown to reduce high blood pressure (hypertension). It may also reduce your risk for type 2 diabetes, heart disease, and stroke.  · With the DASH eating plan, you should limit salt (sodium) intake to 2,300 mg a day. If you have hypertension, you may need to reduce your sodium intake to 1,500 mg a day.  · When on the DASH eating plan, aim to eat more fresh fruits and vegetables, whole grains, lean proteins, low-fat dairy, and heart-healthy fats.  · Work with your health care provider or diet and nutrition specialist (dietitian) to adjust your eating plan to your individual calorie needs.  This information is not intended to replace advice given to you by your health care provider. Make sure you discuss any questions you have with your health care provider.  Document Released: 12/06/2012 Document Revised: 12/11/2017 Document Reviewed: 12/11/2017  [employer] Interactive Patient Education © 2017 [employer]    Medicare Wellness  Personal Prevention Plan of Service     Date of Office  Visit:  2018  Encounter Provider:  Smith Cabello MD  Place of Service:  [facility]  Patient Name: JoseL uis Davis  :  1950    As part of the Medicare Wellness portion of your visit today, we are providing you with this personalized preventive plan of services (PPPS). This plan is based upon recommendations of the [place] Preventive Services Task Force (USPSTF) and the Advisory Committee on Immunization Practices (ACIP).    This lists the preventive care services that should be considered, and provides dates of when you are due. Items listed as completed are up-to-date and do not require any further intervention.    Health Maintenance   Topic Date Due   • HEPATITIS C SCREENING  2017   • ZOSTER VACCINE (2 of 2) 2017   • MEDICARE ANNUAL WELLNESS  2018   • INFLUENZA VACCINE  2018   • COLONOSCOPY  2027   • TDAP/TD VACCINES (2 - Td) 2027   • AAA SCREEN (ONE-TIME)  Completed   • PNEUMOCOCCAL VACCINES (65+ LOW/MEDIUM RISK)  Excluded       Orders Placed This Encounter   Procedures   • Comprehensive Metabolic Panel     Standing Status:   Future   • Lipid Panel     Standing Status:   Future   • Vitamin D 25 Hydroxy     Standing Status:   Future   • CBC & Differential     Standing Status:   Future     Order Specific Question:   Manual Differential     Answer:   No       No Follow-up on file.

## 2018-05-29 NOTE — PROGRESS NOTES
QUICK REFERENCE INFORMATION:  The ABCs of the Annual Wellness Visit    Initial Medicare Wellness Visit    HEALTH RISK ASSESSMENT    1950    Recent Hospitalizations:  No hospitalization(s) within the last year..        Current Medical Providers:  Patient Care Team:  No Known Provider as PCP - General  Smith Cabello MD as PCP - Claims Attributed  Nathan Freeman Jr., MD as Consulting Physician (Urology)        Smoking Status:  History   Smoking Status   • Former Smoker   Smokeless Tobacco   • Not on file     Comment: quit about 30 yrs ago       Alcohol Consumption:  History   Alcohol Use   • 3.6 oz/week   • 6 Cans of beer per week       Depression Screen:   PHQ-2/PHQ-9 Depression Screening 5/29/2018   Little interest or pleasure in doing things 0   Feeling down, depressed, or hopeless 0   Trouble falling or staying asleep, or sleeping too much -   Feeling tired or having little energy -   Poor appetite or overeating -   Feeling bad about yourself - or that you are a failure or have let yourself or your family down -   Trouble concentrating on things, such as reading the newspaper or watching television -   Moving or speaking so slowly that other people could have noticed. Or the opposite - being so fidgety or restless that you have been moving around a lot more than usual -   Thoughts that you would be better off dead, or of hurting yourself in some way -   Total Score 0   If you checked off any problems, how difficult have these problems made it for you to do your work, take care of things at home, or get along with other people? -       Health Habits and Functional and Cognitive Screening:  Functional & Cognitive Status 5/29/2018   Do you have difficulty preparing food and eating? No   Do you have difficulty bathing yourself, getting dressed or grooming yourself? No   Do you have difficulty using the toilet? No   Do you have difficulty moving around from place to place? No   Do you have trouble with steps or  getting out of a bed or a chair? No   In the past year have you fallen or experienced a near fall? Yes   Current Diet Well Balanced Diet   Dental Exam Up to date   Eye Exam Up to date   Exercise (times per week) 3 times per week   Current Exercise Activities Include Cardiovasular Workout on Exercise Equipment   Do you need help using the phone?  No   Are you deaf or do you have serious difficulty hearing?  No   Do you need help with transportation? No   Do you need help shopping? No   Do you need help preparing meals?  No   Do you need help with housework?  No   Do you need help with laundry? No   Do you need help taking your medications? No   Do you need help managing money? No   Do you ever drive or ride in a car without wearing a seat belt? No   Have you felt unusual stress, anger or loneliness in the last month? No   Who do you live with? Alone   If you need help, do you have trouble finding someone available to you? No   Have you been bothered in the last four weeks by sexual problems? No   Do you have difficulty concentrating, remembering or making decisions? No           Does the patient have evidence of cognitive impairment? No    Asiprin use counseling: Does not need ASA (and currently is not on it)      Recent Lab Results:    Visual Acuity:  No exam data present    Age-appropriate Screening Schedule:  Refer to the list below for future screening recommendations based on patient's age, sex and/or medical conditions. Orders for these recommended tests are listed in the plan section. The patient has been provided with a written plan.    Health Maintenance   Topic Date Due   • ZOSTER VACCINE (2 of 2) 06/23/2017   • INFLUENZA VACCINE  08/01/2018   • COLONOSCOPY  04/28/2027   • TDAP/TD VACCINES (2 - Td) 04/28/2027   • PNEUMOCOCCAL VACCINES (65+ LOW/MEDIUM RISK)  Excluded        Subjective   History of Present Illness    Jose Luis Davis is a 67 y.o. male who presents for an Annual Wellness Visit.    The  "following portions of the patient's history were reviewed and updated as appropriate: allergies, current medications, past family history, past medical history, past social history, past surgical history and problem list.    Outpatient Medications Prior to Visit   Medication Sig Dispense Refill   • Multiple Vitamins-Minerals (MULTIVITAMIN ADULT PO) Take 1 tablet/day by mouth.     • lisinopril (PRINIVIL,ZESTRIL) 20 MG tablet Take 1 tablet by mouth Daily. 90 tablet 2   • saw palmetto 80 MG capsule Take 80 mg by mouth daily.       No facility-administered medications prior to visit.        Patient Active Problem List   Diagnosis   • Ureterolithiasis   • Benign nodular prostatic hyperplasia   • Essential hypertension   • Dyslipidemia   • Osteoarthritis of multiple joints       Advance Care Planning:  has an advance directive - a copy HAS NOT been provided. Have asked the patient to send this to us to add to record.    Identification of Risk Factors:  Risk factors include: NA.    Review of Systems    Compared to one year ago, the patient feels his physical health is the same.  Compared to one year ago, the patient feels his mental health is the same.    Objective     Physical Exam    Vitals:    05/29/18 0940   BP: 122/78   BP Location: Left arm   Patient Position: Sitting   Cuff Size: Adult   Pulse: 80   Resp: 15   Temp: 98.3 °F (36.8 °C)   TempSrc: Oral   SpO2: 98%   Weight: 96.6 kg (213 lb)   Height: 182.9 cm (72.01\")   PainSc: 0-No pain       Patient's Body mass index is 28.88 kg/m². BMI is within normal parameters. No follow-up required.      Assessment/Plan   Patient Self-Management and Personalized Health Advice  The patient has been provided with information about: NA and preventive services including:   · NA.    Visit Diagnoses:    ICD-10-CM ICD-9-CM   1. Essential hypertension I10 401.9   2. Osteoarthritis of multiple joints, unspecified osteoarthritis type M15.9 715.89   3. Dyslipidemia E78.5 272.4   4. Vitamin " D deficiency  E55.9 268.9       Orders Placed This Encounter   Procedures   • Comprehensive Metabolic Panel     Standing Status:   Future   • Lipid Panel     Standing Status:   Future   • Vitamin D 25 Hydroxy     Standing Status:   Future   • CBC & Differential     Standing Status:   Future     Order Specific Question:   Manual Differential     Answer:   No       Outpatient Encounter Prescriptions as of 5/29/2018   Medication Sig Dispense Refill   • Multiple Vitamins-Minerals (MULTIVITAMIN ADULT PO) Take 1 tablet/day by mouth.     • Saw Palmetto 450 MG capsule Take  by mouth 3 (Three) Times a Day.     • [DISCONTINUED] lisinopril (PRINIVIL,ZESTRIL) 20 MG tablet Take 1 tablet by mouth Daily. 90 tablet 2   • [DISCONTINUED] lisinopril (PRINIVIL,ZESTRIL) 20 MG tablet Take 1 tablet by mouth Daily. 90 tablet 2   • [DISCONTINUED] saw palmetto 80 MG capsule Take 80 mg by mouth daily.       No facility-administered encounter medications on file as of 5/29/2018.        Reviewed use of high risk medication in the elderly: not applicable  Reviewed for potential of harmful drug interactions in the elderly: not applicable    Follow Up:  No Follow-up on file.     An After Visit Summary and PPPS with all of these plans were given to the patient.

## 2018-06-04 ENCOUNTER — HOSPITAL ENCOUNTER (OUTPATIENT)
Dept: CT IMAGING | Facility: HOSPITAL | Age: 68
Discharge: HOME | End: 2018-06-04
Admitting: INTERNAL MEDICINE

## 2018-06-04 ENCOUNTER — HOSPITAL ENCOUNTER (OUTPATIENT)
Dept: CARDIOLOGY | Facility: HOSPITAL | Age: 68
Discharge: HOME | End: 2018-06-04

## 2018-06-04 DIAGNOSIS — G44.209 TENSION-TYPE HEADACHE, NOT INTRACTABLE, UNSPECIFIED CHRONICITY PATTERN: ICD-10-CM

## 2018-06-04 DIAGNOSIS — I79.8 OTHER DISORDERS OF ARTERIES, ARTERIOLES AND CAPILLARIES IN DISEASES CLASSIFIED ELSEWHERE (HCC): ICD-10-CM

## 2018-06-04 LAB
BH CV XLRA MEAS LEFT CCA RATIO VEL: -91.9 CM/SEC
BH CV XLRA MEAS LEFT DIST CCA EDV: -33 CM/SEC
BH CV XLRA MEAS LEFT DIST CCA PSV: -91.9 CM/SEC
BH CV XLRA MEAS LEFT DIST ICA EDV: -29.3 CM/SEC
BH CV XLRA MEAS LEFT DIST ICA PSV: -66.9 CM/SEC
BH CV XLRA MEAS LEFT ICA RATIO VEL: -69.1 CM/SEC
BH CV XLRA MEAS LEFT ICA/CCA RATIO: 0.75
BH CV XLRA MEAS LEFT MID ICA EDV: -29.3 CM/SEC
BH CV XLRA MEAS LEFT MID ICA PSV: -63.3 CM/SEC
BH CV XLRA MEAS LEFT PROX CCA EDV: 26.7 CM/SEC
BH CV XLRA MEAS LEFT PROX CCA PSV: 115 CM/SEC
BH CV XLRA MEAS LEFT PROX ECA EDV: -26.7 CM/SEC
BH CV XLRA MEAS LEFT PROX ECA PSV: -106 CM/SEC
BH CV XLRA MEAS LEFT PROX ICA EDV: -27.7 CM/SEC
BH CV XLRA MEAS LEFT PROX ICA PSV: -69.1 CM/SEC
BH CV XLRA MEAS LEFT PROX SCLA PSV: 130 CM/SEC
BH CV XLRA MEAS LEFT VERTEBRAL A EDV: 18.8 CM/SEC
BH CV XLRA MEAS LEFT VERTEBRAL A PSV: 58 CM/SEC
BH CV XLRA MEAS RIGHT CCA RATIO VEL: 84.4 CM/SEC
BH CV XLRA MEAS RIGHT DIST CCA EDV: 25.8 CM/SEC
BH CV XLRA MEAS RIGHT DIST CCA PSV: 84.4 CM/SEC
BH CV XLRA MEAS RIGHT DIST ICA EDV: -26.3 CM/SEC
BH CV XLRA MEAS RIGHT DIST ICA PSV: -55.8 CM/SEC
BH CV XLRA MEAS RIGHT ICA RATIO VEL: -62.5 CM/SEC
BH CV XLRA MEAS RIGHT ICA/CCA RATIO: -0.74
BH CV XLRA MEAS RIGHT MID ICA EDV: -28.7 CM/SEC
BH CV XLRA MEAS RIGHT MID ICA PSV: -62.5 CM/SEC
BH CV XLRA MEAS RIGHT PROX CCA EDV: 26.7 CM/SEC
BH CV XLRA MEAS RIGHT PROX CCA PSV: 116 CM/SEC
BH CV XLRA MEAS RIGHT PROX ECA EDV: -21.7 CM/SEC
BH CV XLRA MEAS RIGHT PROX ECA PSV: -96.2 CM/SEC
BH CV XLRA MEAS RIGHT PROX ICA EDV: -23.6 CM/SEC
BH CV XLRA MEAS RIGHT PROX ICA PSV: -55.4 CM/SEC
BH CV XLRA MEAS RIGHT PROX SCLA PSV: 112 CM/SEC
BH CV XLRA MEAS RIGHT VERTEBRAL A EDV: 15.7 CM/SEC
BH CV XLRA MEAS RIGHT VERTEBRAL A PSV: 42 CM/SEC
LEFT ARM BP: NORMAL MMHG
RIGHT ARM BP: NORMAL MMHG

## 2018-06-04 PROCEDURE — 70450 CT HEAD/BRAIN W/O DYE: CPT

## 2018-06-04 PROCEDURE — 93880 EXTRACRANIAL BILAT STUDY: CPT

## 2018-06-12 ENCOUNTER — TELEPHONE (OUTPATIENT)
Dept: FAMILY MEDICINE CLINIC | Facility: CLINIC | Age: 68
End: 2018-06-12

## 2018-06-12 ENCOUNTER — CLINICAL SUPPORT (OUTPATIENT)
Dept: FAMILY MEDICINE CLINIC | Facility: CLINIC | Age: 68
End: 2018-06-12

## 2018-06-12 VITALS — DIASTOLIC BLOOD PRESSURE: 82 MMHG | SYSTOLIC BLOOD PRESSURE: 150 MMHG

## 2018-06-12 DIAGNOSIS — I65.29 STENOSIS OF CAROTID ARTERY, UNSPECIFIED LATERALITY: Primary | ICD-10-CM

## 2018-06-12 DIAGNOSIS — Z01.30 BLOOD PRESSURE CHECK: Primary | ICD-10-CM

## 2018-06-12 NOTE — TELEPHONE ENCOUNTER
Had duplex scan at [facility] 6/4/18. He is asking for results.    Best number to reach him [phone].

## 2018-06-12 NOTE — TELEPHONE ENCOUNTER
DATE, B/P, PULSE, TIME OF DAY    5/30   114/80   74   11:30AM    5/31   125/77   79   4:00PM    6/1   117/75   76   8:00AM    6/2   121/72   74   9:00PM    6/3   125/76   76   10:00AM    6/4   130/73   62   10;00AM    6/5   118/76   83   12:15PM    6/6   116/82   72   12:00PM    6/7   118/90   77   2:10PM    6/8   128/76   65   9:00AM    6/9   132/77   70   7:30PM    6/10   127/83   79   1:00PM    6/11   128/79   72   11:45AM    6/12   122/87   64   7:30AM

## 2018-06-20 ENCOUNTER — OFFICE VISIT (OUTPATIENT)
Dept: FAMILY MEDICINE CLINIC | Facility: CLINIC | Age: 68
End: 2018-06-20

## 2018-06-20 VITALS
HEIGHT: 72 IN | WEIGHT: 212 LBS | DIASTOLIC BLOOD PRESSURE: 78 MMHG | TEMPERATURE: 99 F | BODY MASS INDEX: 28.71 KG/M2 | OXYGEN SATURATION: 98 % | HEART RATE: 84 BPM | SYSTOLIC BLOOD PRESSURE: 152 MMHG

## 2018-06-20 DIAGNOSIS — E78.5 DYSLIPIDEMIA: ICD-10-CM

## 2018-06-20 DIAGNOSIS — M15.9 PRIMARY OSTEOARTHRITIS INVOLVING MULTIPLE JOINTS: ICD-10-CM

## 2018-06-20 DIAGNOSIS — G44.311 INTRACTABLE ACUTE POST-TRAUMATIC HEADACHE: ICD-10-CM

## 2018-06-20 DIAGNOSIS — I10 ESSENTIAL HYPERTENSION: Primary | ICD-10-CM

## 2018-06-20 PROBLEM — G44.209 TENSION TYPE HEADACHE: Status: ACTIVE | Noted: 2018-06-20

## 2018-06-20 PROCEDURE — 99214 OFFICE O/P EST MOD 30 MIN: CPT | Performed by: INTERNAL MEDICINE

## 2018-06-20 NOTE — PROGRESS NOTES
Subjective   Jose Luis Davis is a 67 y.o. male.     History of Present Illness   Patient was seen today for hypertension.  His blood pressure always runs 130-120 over 80s at home.  He did have a CT scan of head for headaches which was negative except for retention cyst sinusitis he also had a carotid Doppler which showed only mild plaquing.  He has started low-dose aspirin is being referred to a neurologist for further workup.  He has started a statin to help control his lipids also with diet and exercise.  His osteoarthritis been well-controlled with over-the-counter medication.    Dictated utilizing Dragon dictation. If there are questions or for further clarification, please contact me.   The following portions of the patient's history were reviewed and updated as appropriate: allergies, current medications, past family history, past medical history, past social history, past surgical history and problem list.    Review of Systems   Constitutional: Negative for fatigue and fever.   HENT: Positive for congestion. Negative for trouble swallowing.    Eyes: Negative for discharge and visual disturbance.   Respiratory: Negative for choking and shortness of breath.    Cardiovascular: Negative for chest pain and palpitations.   Gastrointestinal: Negative for abdominal pain and blood in stool.   Endocrine: Negative.    Genitourinary: Negative for genital sores and hematuria.   Musculoskeletal: Negative for gait problem and joint swelling.   Skin: Negative for color change, pallor, rash and wound.   Allergic/Immunologic: Positive for environmental allergies. Negative for immunocompromised state.   Neurological: Positive for headaches. Negative for facial asymmetry and speech difficulty.   Psychiatric/Behavioral: Negative for hallucinations and suicidal ideas.       Objective   Physical Exam   Constitutional: He is oriented to person, place, and time. He appears well-developed and well-nourished.   HENT:   Head:  Normocephalic.   Eyes: Conjunctivae are normal. Pupils are equal, round, and reactive to light.   Neck: Normal range of motion. Neck supple.   Cardiovascular: Normal rate, regular rhythm and normal heart sounds.    Pulmonary/Chest: Effort normal and breath sounds normal.   Abdominal: Soft. Bowel sounds are normal.   Musculoskeletal: Normal range of motion.   Neurological: He is alert and oriented to person, place, and time.   Skin: Skin is warm and dry.   Psychiatric: He has a normal mood and affect. His behavior is normal. Judgment and thought content normal.   Nursing note and vitals reviewed.      Assessment/Plan   Problems Addressed this Visit        Cardiovascular and Mediastinum    Essential hypertension - Primary       Musculoskeletal and Integument    Osteoarthritis of multiple joints       Other    Dyslipidemia      Other Visit Diagnoses     Intractable acute post-traumatic headache        Relevant Orders    Ambulatory Referral to Neurology

## 2018-06-28 ENCOUNTER — LAB (OUTPATIENT)
Dept: FAMILY MEDICINE CLINIC | Facility: CLINIC | Age: 68
End: 2018-06-28

## 2018-06-28 DIAGNOSIS — E55.9 VITAMIN D DEFICIENCY: ICD-10-CM

## 2018-06-28 DIAGNOSIS — E78.5 DYSLIPIDEMIA: ICD-10-CM

## 2018-06-28 DIAGNOSIS — M15.9 OSTEOARTHRITIS OF MULTIPLE JOINTS, UNSPECIFIED OSTEOARTHRITIS TYPE: ICD-10-CM

## 2018-06-28 DIAGNOSIS — I10 ESSENTIAL HYPERTENSION: ICD-10-CM

## 2018-06-28 DIAGNOSIS — Z00.00 MEDICARE ANNUAL WELLNESS VISIT, INITIAL: ICD-10-CM

## 2018-06-28 LAB
25(OH)D3 SERPL-MCNC: 47.3 NG/ML (ref 30–100)
ALBUMIN SERPL-MCNC: 4.9 G/DL (ref 3.5–5.2)
ALBUMIN/GLOB SERPL: 1.4 G/DL
ALP SERPL-CCNC: 77 U/L (ref 39–117)
ALT SERPL W P-5'-P-CCNC: 35 U/L (ref 1–41)
ANION GAP SERPL CALCULATED.3IONS-SCNC: 15.5 MMOL/L
AST SERPL-CCNC: 20 U/L (ref 1–40)
BILIRUB SERPL-MCNC: 0.5 MG/DL (ref 0.1–1.2)
BUN BLD-MCNC: 15 MG/DL (ref 8–23)
BUN/CREAT SERPL: 13.5 (ref 7–25)
CALCIUM SPEC-SCNC: 10.3 MG/DL (ref 8.6–10.5)
CHLORIDE SERPL-SCNC: 105 MMOL/L (ref 98–107)
CHOLEST SERPL-MCNC: 117 MG/DL (ref 0–200)
CO2 SERPL-SCNC: 23.5 MMOL/L (ref 22–29)
CREAT BLD-MCNC: 1.11 MG/DL (ref 0.76–1.27)
ERYTHROCYTE [DISTWIDTH] IN BLOOD BY AUTOMATED COUNT: 12.6 % (ref 4.5–15)
GFR SERPL CREATININE-BSD FRML MDRD: 66 ML/MIN/1.73
GLOBULIN UR ELPH-MCNC: 3.4 GM/DL
GLUCOSE BLD-MCNC: 109 MG/DL (ref 65–99)
HCT VFR BLD AUTO: 43.2 % (ref 35–60)
HDLC SERPL-MCNC: 44 MG/DL (ref 40–60)
HGB BLD-MCNC: 14.7 G/DL (ref 13.5–18)
LDLC SERPL CALC-MCNC: 60 MG/DL (ref 0–100)
LDLC/HDLC SERPL: 1.37 {RATIO}
LYMPHOCYTES # BLD AUTO: 3.3 10*3/MM3 (ref 1.2–3.4)
LYMPHOCYTES NFR BLD AUTO: 44 % (ref 21–51)
MCH RBC QN AUTO: 31.7 PG (ref 26.1–33.1)
MCHC RBC AUTO-ENTMCNC: 34.1 G/DL (ref 33–37)
MCV RBC AUTO: 92.9 FL (ref 80–99)
MONOCYTES # BLD AUTO: 0.7 10*3/MM3 (ref 0.1–0.6)
MONOCYTES NFR BLD AUTO: 9.4 % (ref 2–9)
NEUTROPHILS # BLD AUTO: 3.5 10*3/MM3 (ref 1.4–6.5)
NEUTROPHILS NFR BLD AUTO: 46.6 % (ref 42–75)
PLATELET # BLD AUTO: 287 10*3/MM3 (ref 150–450)
PMV BLD AUTO: 7.9 FL (ref 7.1–10.5)
POTASSIUM BLD-SCNC: 4.7 MMOL/L (ref 3.5–5.2)
PROT SERPL-MCNC: 8.3 G/DL (ref 6–8.5)
RBC # BLD AUTO: 4.65 10*6/MM3 (ref 4–6)
SODIUM BLD-SCNC: 144 MMOL/L (ref 136–145)
TRIGL SERPL-MCNC: 64 MG/DL (ref 0–150)
VLDLC SERPL-MCNC: 12.8 MG/DL (ref 5–40)
WBC NRBC COR # BLD: 7.5 10*3/MM3 (ref 4.5–10)

## 2018-06-28 PROCEDURE — 85025 COMPLETE CBC W/AUTO DIFF WBC: CPT | Performed by: INTERNAL MEDICINE

## 2018-06-28 PROCEDURE — 82306 VITAMIN D 25 HYDROXY: CPT | Performed by: INTERNAL MEDICINE

## 2018-06-28 PROCEDURE — 36415 COLL VENOUS BLD VENIPUNCTURE: CPT | Performed by: INTERNAL MEDICINE

## 2018-06-28 PROCEDURE — 80053 COMPREHEN METABOLIC PANEL: CPT | Performed by: INTERNAL MEDICINE

## 2018-06-28 PROCEDURE — 80061 LIPID PANEL: CPT | Performed by: INTERNAL MEDICINE

## 2018-08-02 ENCOUNTER — OFFICE VISIT (OUTPATIENT)
Dept: FAMILY MEDICINE CLINIC | Facility: CLINIC | Age: 68
End: 2018-08-02

## 2018-08-02 VITALS
HEIGHT: 72 IN | HEART RATE: 60 BPM | BODY MASS INDEX: 28.91 KG/M2 | WEIGHT: 213.4 LBS | TEMPERATURE: 98.8 F | DIASTOLIC BLOOD PRESSURE: 76 MMHG | OXYGEN SATURATION: 100 % | SYSTOLIC BLOOD PRESSURE: 138 MMHG

## 2018-08-02 DIAGNOSIS — I10 ESSENTIAL HYPERTENSION: Primary | ICD-10-CM

## 2018-08-02 DIAGNOSIS — E78.5 DYSLIPIDEMIA: ICD-10-CM

## 2018-08-02 DIAGNOSIS — M15.9 PRIMARY OSTEOARTHRITIS INVOLVING MULTIPLE JOINTS: ICD-10-CM

## 2018-08-02 PROCEDURE — 99214 OFFICE O/P EST MOD 30 MIN: CPT | Performed by: INTERNAL MEDICINE

## 2018-08-02 RX ORDER — INDOMETHACIN 50 MG/1
50 CAPSULE ORAL 2 TIMES DAILY PRN
COMMUNITY
Start: 2018-07-11 | End: 2022-03-16

## 2018-08-02 RX ORDER — PROPRANOLOL HYDROCHLORIDE 80 MG/1
80 CAPSULE, EXTENDED RELEASE ORAL
COMMUNITY
Start: 2018-07-11 | End: 2019-06-06

## 2018-08-02 NOTE — PROGRESS NOTES
Subjective   Jose Luis Davis is a 67 y.o. male.     History of Present Illness   Patient was seen for hypertension.  Blood pressure is running1 130s over 80s.  His osteoarthritis is been controlled with over-the-counter medication.  His lipid status being controlled with diet and exercise and a statin patient will continue to monitor blood pressure return to our clinic 4 months.    Dictated utilizing Dragon dictation. If there are questions or for further clarification, please contact me.   The following portions of the patient's history were reviewed and updated as appropriate: allergies, current medications, past family history, past medical history, past social history, past surgical history and problem list.    Review of Systems   Constitutional: Negative for fatigue and fever.   HENT: Positive for congestion. Negative for trouble swallowing.    Eyes: Negative for discharge and visual disturbance.   Respiratory: Negative for choking and shortness of breath.    Cardiovascular: Negative for chest pain and palpitations.   Gastrointestinal: Negative for abdominal pain and blood in stool.   Endocrine: Negative.    Genitourinary: Negative for genital sores and hematuria.   Musculoskeletal: Negative for gait problem and joint swelling.   Skin: Negative for color change, pallor, rash and wound.   Allergic/Immunologic: Positive for environmental allergies. Negative for immunocompromised state.   Neurological: Negative for facial asymmetry and speech difficulty.   Psychiatric/Behavioral: Negative for hallucinations and suicidal ideas.       Objective   Physical Exam   Constitutional: He is oriented to person, place, and time. He appears well-developed and well-nourished.   HENT:   Head: Normocephalic.   Eyes: Pupils are equal, round, and reactive to light. Conjunctivae are normal.   Neck: Normal range of motion. Neck supple.   Cardiovascular: Normal rate, regular rhythm and normal heart sounds.    Pulmonary/Chest: Effort  normal and breath sounds normal.   Abdominal: Soft. Bowel sounds are normal.   Musculoskeletal: Normal range of motion.   Neurological: He is alert and oriented to person, place, and time.   Skin: Skin is warm and dry.   Psychiatric: He has a normal mood and affect. His behavior is normal. Judgment and thought content normal.   Nursing note and vitals reviewed.      Assessment/Plan   Problems Addressed this Visit        Cardiovascular and Mediastinum    Essential hypertension - Primary    Relevant Medications    propranolol LA (INDERAL LA) 80 MG 24 hr capsule       Musculoskeletal and Integument    Osteoarthritis of multiple joints       Other    Dyslipidemia

## 2018-10-03 ENCOUNTER — CLINICAL SUPPORT (OUTPATIENT)
Dept: FAMILY MEDICINE CLINIC | Facility: CLINIC | Age: 68
End: 2018-10-03

## 2018-10-03 PROCEDURE — 90662 IIV NO PRSV INCREASED AG IM: CPT | Performed by: INTERNAL MEDICINE

## 2018-10-03 PROCEDURE — G0008 ADMIN INFLUENZA VIRUS VAC: HCPCS | Performed by: INTERNAL MEDICINE

## 2018-11-26 ENCOUNTER — TELEPHONE (OUTPATIENT)
Dept: FAMILY MEDICINE CLINIC | Facility: CLINIC | Age: 68
End: 2018-11-26

## 2018-12-06 ENCOUNTER — OFFICE VISIT (OUTPATIENT)
Dept: FAMILY MEDICINE CLINIC | Facility: CLINIC | Age: 68
End: 2018-12-06

## 2018-12-06 VITALS
HEART RATE: 61 BPM | DIASTOLIC BLOOD PRESSURE: 78 MMHG | OXYGEN SATURATION: 99 % | BODY MASS INDEX: 28.85 KG/M2 | SYSTOLIC BLOOD PRESSURE: 110 MMHG | TEMPERATURE: 97.7 F | HEIGHT: 72 IN | WEIGHT: 213 LBS | RESPIRATION RATE: 16 BRPM

## 2018-12-06 DIAGNOSIS — M15.9 PRIMARY OSTEOARTHRITIS INVOLVING MULTIPLE JOINTS: ICD-10-CM

## 2018-12-06 DIAGNOSIS — I10 ESSENTIAL HYPERTENSION: Primary | ICD-10-CM

## 2018-12-06 DIAGNOSIS — E55.9 VITAMIN D DEFICIENCY: ICD-10-CM

## 2018-12-06 DIAGNOSIS — E78.5 DYSLIPIDEMIA: ICD-10-CM

## 2018-12-06 PROBLEM — G43.009 MIGRAINE WITHOUT AURA: Status: ACTIVE | Noted: 2018-12-06

## 2018-12-06 LAB
25(OH)D3 SERPL-MCNC: 43.6 NG/ML (ref 30–100)
ALBUMIN SERPL-MCNC: 4.7 G/DL (ref 3.5–5.2)
ALBUMIN/GLOB SERPL: 1.5 G/DL
ALP SERPL-CCNC: 73 U/L (ref 39–117)
ALT SERPL W P-5'-P-CCNC: 32 U/L (ref 1–41)
ANION GAP SERPL CALCULATED.3IONS-SCNC: 9.2 MMOL/L
AST SERPL-CCNC: 32 U/L (ref 1–40)
BILIRUB SERPL-MCNC: 0.8 MG/DL (ref 0.1–1.2)
BUN BLD-MCNC: 22 MG/DL (ref 8–23)
BUN/CREAT SERPL: 20.4 (ref 7–25)
CALCIUM SPEC-SCNC: 9.9 MG/DL (ref 8.6–10.5)
CHLORIDE SERPL-SCNC: 103 MMOL/L (ref 98–107)
CHOLEST SERPL-MCNC: 207 MG/DL (ref 0–200)
CO2 SERPL-SCNC: 27.8 MMOL/L (ref 22–29)
CREAT BLD-MCNC: 1.08 MG/DL (ref 0.76–1.27)
ERYTHROCYTE [DISTWIDTH] IN BLOOD BY AUTOMATED COUNT: 13.2 % (ref 4.5–15)
GFR SERPL CREATININE-BSD FRML MDRD: 68 ML/MIN/1.73
GLOBULIN UR ELPH-MCNC: 3.1 GM/DL
GLUCOSE BLD-MCNC: 108 MG/DL (ref 65–99)
HCT VFR BLD AUTO: 43.4 % (ref 35–60)
HDLC SERPL-MCNC: 45 MG/DL (ref 40–60)
HGB BLD-MCNC: 14.8 G/DL (ref 13.5–18)
LDLC SERPL CALC-MCNC: 147 MG/DL (ref 0–100)
LDLC/HDLC SERPL: 3.26 {RATIO}
LYMPHOCYTES # BLD AUTO: 2.4 10*3/MM3 (ref 1.2–3.4)
LYMPHOCYTES NFR BLD AUTO: 33.6 % (ref 21–51)
MCH RBC QN AUTO: 32.6 PG (ref 26.1–33.1)
MCHC RBC AUTO-ENTMCNC: 34.2 G/DL (ref 33–37)
MCV RBC AUTO: 95.3 FL (ref 80–99)
MONOCYTES # BLD AUTO: 0.6 10*3/MM3 (ref 0.1–0.6)
MONOCYTES NFR BLD AUTO: 8.9 % (ref 2–9)
NEUTROPHILS # BLD AUTO: 4 10*3/MM3 (ref 1.4–6.5)
NEUTROPHILS NFR BLD AUTO: 57.5 % (ref 42–75)
PLATELET # BLD AUTO: 277 10*3/MM3 (ref 150–450)
PMV BLD AUTO: 7.5 FL (ref 7.1–10.5)
POTASSIUM BLD-SCNC: 5.4 MMOL/L (ref 3.5–5.2)
PROT SERPL-MCNC: 7.8 G/DL (ref 6–8.5)
RBC # BLD AUTO: 4.55 10*6/MM3 (ref 4–6)
SODIUM BLD-SCNC: 140 MMOL/L (ref 136–145)
TRIGL SERPL-MCNC: 77 MG/DL (ref 0–150)
VLDLC SERPL-MCNC: 15.4 MG/DL (ref 5–40)
WBC NRBC COR # BLD: 7 10*3/MM3 (ref 4.5–10)

## 2018-12-06 PROCEDURE — 80061 LIPID PANEL: CPT | Performed by: INTERNAL MEDICINE

## 2018-12-06 PROCEDURE — 80053 COMPREHEN METABOLIC PANEL: CPT | Performed by: INTERNAL MEDICINE

## 2018-12-06 PROCEDURE — 82306 VITAMIN D 25 HYDROXY: CPT | Performed by: INTERNAL MEDICINE

## 2018-12-06 PROCEDURE — 99214 OFFICE O/P EST MOD 30 MIN: CPT | Performed by: INTERNAL MEDICINE

## 2018-12-06 PROCEDURE — 85025 COMPLETE CBC W/AUTO DIFF WBC: CPT | Performed by: INTERNAL MEDICINE

## 2018-12-06 PROCEDURE — 36415 COLL VENOUS BLD VENIPUNCTURE: CPT | Performed by: INTERNAL MEDICINE

## 2018-12-06 RX ORDER — MAGNESIUM GLUCONATE 27 MG(500)
400 TABLET ORAL DAILY
COMMUNITY
End: 2022-09-28 | Stop reason: DRUGHIGH

## 2018-12-06 NOTE — PROGRESS NOTES
Subjective   Jose Luis Davis is a 68 y.o. male.     History of Present Illness   Patient was seen for hypertension.  Pressures been running 140s over 80s.  His osteoarthritis been controlled with exercise and over-the-counter medication.  His lipids also controlled with diet and exercise and statin.  Patient will continue to monitor blood pressure return to our clinic in 6 months.    Dictated utilizing Dragon dictation. If there are questions or for further clarification, please contact me.   The following portions of the patient's history were reviewed and updated as appropriate: allergies, current medications, past family history, past medical history, past social history, past surgical history and problem list.    Review of Systems   Constitutional: Negative for fatigue and fever.   HENT: Positive for congestion. Negative for trouble swallowing.    Eyes: Negative for discharge and visual disturbance.   Respiratory: Negative for choking and shortness of breath.    Cardiovascular: Negative for chest pain and palpitations.   Gastrointestinal: Negative for abdominal pain and blood in stool.   Endocrine: Negative.    Genitourinary: Negative for genital sores and hematuria.   Musculoskeletal: Negative for gait problem and joint swelling.   Skin: Negative for color change, pallor, rash and wound.   Allergic/Immunologic: Positive for environmental allergies. Negative for immunocompromised state.   Neurological: Negative for facial asymmetry and speech difficulty.   Psychiatric/Behavioral: Negative for hallucinations and suicidal ideas.       Objective   Physical Exam   Constitutional: He is oriented to person, place, and time. He appears well-developed and well-nourished.   HENT:   Head: Normocephalic.   Eyes: Conjunctivae are normal. Pupils are equal, round, and reactive to light.   Neck: Normal range of motion. Neck supple.   Cardiovascular: Normal rate, regular rhythm and normal heart sounds.   Pulmonary/Chest:  Effort normal and breath sounds normal.   Abdominal: Soft. Bowel sounds are normal.   Musculoskeletal: Normal range of motion.   Neurological: He is alert and oriented to person, place, and time.   Skin: Skin is warm and dry.   Psychiatric: He has a normal mood and affect. His behavior is normal. Judgment and thought content normal.   Nursing note and vitals reviewed.      Assessment/Plan   Problems Addressed this Visit        Cardiovascular and Mediastinum    Essential hypertension - Primary    Relevant Orders    Comprehensive Metabolic Panel    CBC & Differential (Completed)    Lipid Panel    Vitamin D 25 Hydroxy    CBC Auto Differential (Completed)       Musculoskeletal and Integument    Osteoarthritis of multiple joints    Relevant Orders    Comprehensive Metabolic Panel    CBC & Differential (Completed)    Lipid Panel    Vitamin D 25 Hydroxy    CBC Auto Differential (Completed)       Other    Dyslipidemia    Relevant Orders    Comprehensive Metabolic Panel    CBC & Differential (Completed)    Lipid Panel    Vitamin D 25 Hydroxy    CBC Auto Differential (Completed)      Other Visit Diagnoses     Vitamin D deficiency         Relevant Orders    Vitamin D 25 Hydroxy

## 2018-12-07 RX ORDER — ROSUVASTATIN CALCIUM 20 MG/1
20 TABLET, COATED ORAL DAILY
Qty: 90 TABLET | Refills: 1 | Status: SHIPPED | OUTPATIENT
Start: 2018-12-07 | End: 2019-06-06 | Stop reason: SDUPTHER

## 2019-06-06 ENCOUNTER — OFFICE VISIT (OUTPATIENT)
Dept: FAMILY MEDICINE CLINIC | Facility: CLINIC | Age: 69
End: 2019-06-06

## 2019-06-06 VITALS
WEIGHT: 210.8 LBS | OXYGEN SATURATION: 97 % | TEMPERATURE: 98.2 F | SYSTOLIC BLOOD PRESSURE: 132 MMHG | HEART RATE: 70 BPM | HEIGHT: 72 IN | BODY MASS INDEX: 28.55 KG/M2 | DIASTOLIC BLOOD PRESSURE: 70 MMHG

## 2019-06-06 DIAGNOSIS — G43.001 MIGRAINE WITHOUT AURA AND WITH STATUS MIGRAINOSUS, NOT INTRACTABLE: Primary | ICD-10-CM

## 2019-06-06 DIAGNOSIS — E55.9 VITAMIN D DEFICIENCY: ICD-10-CM

## 2019-06-06 DIAGNOSIS — E78.5 DYSLIPIDEMIA: ICD-10-CM

## 2019-06-06 DIAGNOSIS — Z12.5 PROSTATE CANCER SCREENING: ICD-10-CM

## 2019-06-06 DIAGNOSIS — I10 ESSENTIAL HYPERTENSION: ICD-10-CM

## 2019-06-06 LAB
25(OH)D3 SERPL-MCNC: 43.9 NG/ML (ref 30–100)
ALBUMIN SERPL-MCNC: 4.4 G/DL (ref 3.5–5.2)
ALBUMIN/GLOB SERPL: 1.3 G/DL
ALP SERPL-CCNC: 76 U/L (ref 39–117)
ALT SERPL W P-5'-P-CCNC: 32 U/L (ref 1–41)
ANION GAP SERPL CALCULATED.3IONS-SCNC: 10.8 MMOL/L
AST SERPL-CCNC: 26 U/L (ref 1–40)
BILIRUB SERPL-MCNC: 1.1 MG/DL (ref 0.2–1.2)
BUN BLD-MCNC: 14 MG/DL (ref 8–23)
BUN/CREAT SERPL: 14.1 (ref 7–25)
CALCIUM SPEC-SCNC: 10.1 MG/DL (ref 8.6–10.5)
CHLORIDE SERPL-SCNC: 104 MMOL/L (ref 98–107)
CHOLEST SERPL-MCNC: 103 MG/DL (ref 0–200)
CO2 SERPL-SCNC: 26.2 MMOL/L (ref 22–29)
CREAT BLD-MCNC: 0.99 MG/DL (ref 0.76–1.27)
DEPRECATED RDW RBC AUTO: 45.4 FL (ref 37–54)
ERYTHROCYTE [DISTWIDTH] IN BLOOD BY AUTOMATED COUNT: 12.9 % (ref 12.3–15.4)
GFR SERPL CREATININE-BSD FRML MDRD: 75 ML/MIN/1.73
GLOBULIN UR ELPH-MCNC: 3.5 GM/DL
GLUCOSE BLD-MCNC: 108 MG/DL (ref 65–99)
HCT VFR BLD AUTO: 42.1 % (ref 37.5–51)
HDLC SERPL-MCNC: 45 MG/DL (ref 40–60)
HGB BLD-MCNC: 14.1 G/DL (ref 13–17.7)
LDLC SERPL CALC-MCNC: 46 MG/DL (ref 0–100)
LDLC/HDLC SERPL: 1.02 {RATIO}
MAGNESIUM SERPL-MCNC: 2.5 MG/DL (ref 1.6–2.4)
MCH RBC QN AUTO: 32.1 PG (ref 26.6–33)
MCHC RBC AUTO-ENTMCNC: 33.5 G/DL (ref 31.5–35.7)
MCV RBC AUTO: 95.9 FL (ref 79–97)
PLATELET # BLD AUTO: 252 10*3/MM3 (ref 140–450)
PMV BLD AUTO: 10.5 FL (ref 6–12)
POTASSIUM BLD-SCNC: 4.4 MMOL/L (ref 3.5–5.2)
PROT SERPL-MCNC: 7.9 G/DL (ref 6–8.5)
PSA SERPL-MCNC: 3.36 NG/ML (ref 0–4)
RBC # BLD AUTO: 4.39 10*6/MM3 (ref 4.14–5.8)
SODIUM BLD-SCNC: 141 MMOL/L (ref 136–145)
TRIGL SERPL-MCNC: 60 MG/DL (ref 0–150)
VLDLC SERPL-MCNC: 12 MG/DL (ref 5–40)
WBC NRBC COR # BLD: 5.23 10*3/MM3 (ref 3.4–10.8)

## 2019-06-06 PROCEDURE — 36415 COLL VENOUS BLD VENIPUNCTURE: CPT | Performed by: INTERNAL MEDICINE

## 2019-06-06 PROCEDURE — 99214 OFFICE O/P EST MOD 30 MIN: CPT | Performed by: INTERNAL MEDICINE

## 2019-06-06 PROCEDURE — 83735 ASSAY OF MAGNESIUM: CPT | Performed by: INTERNAL MEDICINE

## 2019-06-06 PROCEDURE — 80053 COMPREHEN METABOLIC PANEL: CPT | Performed by: INTERNAL MEDICINE

## 2019-06-06 PROCEDURE — 85027 COMPLETE CBC AUTOMATED: CPT | Performed by: INTERNAL MEDICINE

## 2019-06-06 PROCEDURE — 80061 LIPID PANEL: CPT | Performed by: INTERNAL MEDICINE

## 2019-06-06 PROCEDURE — 82306 VITAMIN D 25 HYDROXY: CPT | Performed by: INTERNAL MEDICINE

## 2019-06-06 PROCEDURE — G0439 PPPS, SUBSEQ VISIT: HCPCS | Performed by: INTERNAL MEDICINE

## 2019-06-06 PROCEDURE — G0103 PSA SCREENING: HCPCS | Performed by: INTERNAL MEDICINE

## 2019-06-06 RX ORDER — ROSUVASTATIN CALCIUM 20 MG/1
20 TABLET, COATED ORAL DAILY
Qty: 90 TABLET | Refills: 2 | Status: SHIPPED | OUTPATIENT
Start: 2019-06-06 | End: 2019-06-11 | Stop reason: DRUGHIGH

## 2019-06-06 NOTE — PROGRESS NOTES
Initial Medicare Wellness Visit   The ABC's of the Annual Wellness Visit    Chief Complaint   Patient presents with   • Hyperlipidemia   • 6 month f/u   • needs bloodwork       HPI:  Jose Luis Davis, -1950, is a 68 y.o. male who presents for an Initial Medicare Wellness Visit.    Recent Hospitalizations:  No hospitalization(s) within the last year..    Current Medical Providers:  Patient Care Team:  Provider, No Known as PCP - General  Smith Cabello MD as PCP - Claims Attributed  Nathan Freeman Jr., MD as Consulting Physician (Urology)    Health Habits and Functional and Cognitive Screening and Depression Screening:  Functional & Cognitive Status 2019   Do you have difficulty preparing food and eating? No   Do you have difficulty bathing yourself, getting dressed or grooming yourself? No   Do you have difficulty using the toilet? No   Do you have difficulty moving around from place to place? No   Do you have trouble with steps or getting out of a bed or a chair? No   In the past year have you fallen or experienced a near fall? No   Current Diet Well Balanced Diet   Dental Exam Up to date   Eye Exam Up to date   Exercise (times per week) 7 times per week   Current Exercise Activities Include Aerobics   Do you need help using the phone?  No   Are you deaf or do you have serious difficulty hearing?  No   Do you need help with transportation? No   Do you need help shopping? No   Do you need help preparing meals?  No   Do you need help with housework?  No   Do you need help with laundry? No   Do you need help taking your medications? No   Do you need help managing money? No   Do you ever drive or ride in a car without wearing a seat belt? No   Have you felt unusual stress, anger or loneliness in the last month? No   Who do you live with? Alone   If you need help, do you have trouble finding someone available to you? No   Have you been bothered in the last four weeks by sexual problems? No   Do you have  difficulty concentrating, remembering or making decisions? No       Compared to one year ago, the patient feels his physical health is better and his mental health is better.    Depression Screen:  PHQ-2/PHQ-9 Depression Screening 6/6/2019   Little interest or pleasure in doing things 0   Feeling down, depressed, or hopeless 0   Trouble falling or staying asleep, or sleeping too much 0   Feeling tired or having little energy 0   Poor appetite or overeating 0   Feeling bad about yourself - or that you are a failure or have let yourself or your family down 0   Trouble concentrating on things, such as reading the newspaper or watching television 0   Moving or speaking so slowly that other people could have noticed. Or the opposite - being so fidgety or restless that you have been moving around a lot more than usual 0   Thoughts that you would be better off dead, or of hurting yourself in some way 0   Total Score 0   If you checked off any problems, how difficult have these problems made it for you to do your work, take care of things at home, or get along with other people? Not difficult at all         Past Medical/Family/Social History:  The following portions of the patient's history were reviewed and updated as appropriate: allergies, current medications, past family history, past medical history, past social history, past surgical history and problem list.    No Known Allergies      Current Outpatient Medications:   •  aspirin 81 MG EC tablet, Take 1 tablet by mouth Daily., Disp: 30 tablet, Rfl: 3  •  Coenzyme Q10 (CO Q 10 PO), Take  by mouth 2 (Two) Times a Day., Disp: , Rfl:   •  indomethacin (INDOCIN) 50 MG capsule, Take 50 mg by mouth 2 (Two) Times a Day As Needed (headache). bid, Disp: , Rfl:   •  magnesium gluconate (MAGONATE) 500 MG tablet, Take 27 mg by mouth Daily., Disp: , Rfl:   •  Multiple Vitamins-Minerals (MULTIVITAMIN ADULT PO), Take 1 tablet/day by mouth., Disp: , Rfl:   •  rosuvastatin (CRESTOR) 20  "MG tablet, Take 1 tablet by mouth Daily., Disp: 90 tablet, Rfl: 1  •  Saw Palmetto 450 MG capsule, Take  by mouth 3 (Three) Times a Day., Disp: , Rfl:     Aspirin use counseling: Does not need ASA (and currently is not on it)    Current medication list contains no high risk medications.  No harmful drug interactions have been identified.     No family history on file.    Social History     Tobacco Use   • Smoking status: Former Smoker   • Tobacco comment: quit about 30 yrs ago   Substance Use Topics   • Alcohol use: Yes     Alcohol/week: 3.6 oz     Types: 6 Cans of beer per week       Past Surgical History:   Procedure Laterality Date   • CYSTOSCOPY URETEROSCOPY LASER LITHOTRIPSY Left 3/12/2016    Procedure: Cyto, Trans urethral left ureterral orifice incision, left ureteroscopy, holmium laser lithotripsy, basket extraction of stone fragment, placement of left double J ureteral stent. ;  Surgeon: Nathan Freeman Jr., MD;  Location: [facility];  Service:        Patient Active Problem List   Diagnosis   • Ureterolithiasis   • Benign nodular prostatic hyperplasia   • Essential hypertension   • Dyslipidemia   • Osteoarthritis of multiple joints   • Tension type headache   • Migraine without aura       Review of Systems    Objective     Vitals:    06/06/19 0859   BP: 132/70   Pulse: 70   Temp: 98.2 °F (36.8 °C)   SpO2: 97%   Weight: 95.6 kg (210 lb 12.8 oz)   Height: 182.9 cm (72\")       Patient's Body mass index is 28.59 kg/m². BMI is within normal parameters. No follow-up required..      No exam data present    The patient has no evidence of cognitve impairment.     Physical Exam    Recent Lab Results:     Lab Results   Component Value Date    CHOL 207 (H) 12/06/2018    TRIG 77 12/06/2018    HDL 45 12/06/2018    VLDL 15.4 12/06/2018    LDLHDL 3.26 12/06/2018         Assessment/Plan   Age-appropriate Screening Schedule:  Refer to the list below for future screening recommendations based on patient's age, sex and/or " medical conditions.      Health Maintenance   Topic Date Due   • ZOSTER VACCINE (2 of 2) 06/23/2017   • INFLUENZA VACCINE  08/01/2019   • COLONOSCOPY  04/28/2027   • TDAP/TD VACCINES (2 - Td) 04/28/2027   • PNEUMOCOCCAL VACCINES (65+ LOW/MEDIUM RISK)  Discontinued       Medicare Risks and Personalized Health Plan:  NA      CMS-Preventive Services Quick Reference  Medicare Preventive Services Addressed:  SHAD    Advance Care Planning:  Patient has an advance directive - a copy has not been provided. Have asked the patient to send this to us to add to record    Diagnoses and all orders for this visit:    1. Essential hypertension (Primary)  -     CBC (No Diff)  -     Comprehensive Metabolic Panel  -     Lipid Panel  -     Vitamin D 25 Hydroxy  -     Magnesium    2. Migraine without aura and with status migrainosus, not intractable  -     CBC (No Diff)  -     Comprehensive Metabolic Panel  -     Lipid Panel  -     Vitamin D 25 Hydroxy  -     Magnesium    3. Dyslipidemia  -     CBC (No Diff)  -     Comprehensive Metabolic Panel  -     Lipid Panel  -     Vitamin D 25 Hydroxy  -     Magnesium    4. Vitamin D deficiency   -     Vitamin D 25 Hydroxy  -     Magnesium    5. Prostate cancer screening  -     PSA Screen        An After Visit Summary and PPPS with all of these plans were given to the patient.      Follow Up:  No Follow-up on file.

## 2019-06-06 NOTE — PATIENT INSTRUCTIONS
Medicare Wellness  Personal Prevention Plan of Service     Date of Office Visit:  2019  Encounter Provider:  Smith Cabello MD  Place of Service:  [facility]  Patient Name: Jose Luis Davis  :  1950    As part of the Medicare Wellness portion of your visit today, we are providing you with this personalized preventive plan of services (PPPS). This plan is based upon recommendations of the [place] Preventive Services Task Force (USPSTF) and the Advisory Committee on Immunization Practices (ACIP).    This lists the preventive care services that should be considered, and provides dates of when you are due. Items listed as completed are up-to-date and do not require any further intervention.    Health Maintenance   Topic Date Due   • HEPATITIS C SCREENING  2017   • ZOSTER VACCINE (2 of 2) 2017   • MEDICARE ANNUAL WELLNESS  2019   • INFLUENZA VACCINE  2019   • COLONOSCOPY  2027   • TDAP/TD VACCINES (2 - Td) 2027   • AAA SCREEN (ONE-TIME)  Completed   • PNEUMOCOCCAL VACCINES (65+ LOW/MEDIUM RISK)  Discontinued       Orders Placed This Encounter   Procedures   • CBC (No Diff)   • Comprehensive Metabolic Panel   • Lipid Panel   • Vitamin D 25 Hydroxy   • Magnesium   • PSA Screen       No Follow-up on file.

## 2019-06-11 ENCOUNTER — TELEPHONE (OUTPATIENT)
Dept: FAMILY MEDICINE CLINIC | Facility: CLINIC | Age: 69
End: 2019-06-11

## 2019-06-11 RX ORDER — ROSUVASTATIN CALCIUM 40 MG/1
40 TABLET, COATED ORAL DAILY
Qty: 90 TABLET | Refills: 1 | Status: SHIPPED | OUTPATIENT
Start: 2019-06-11 | End: 2019-12-07 | Stop reason: SDUPTHER

## 2019-06-11 NOTE — TELEPHONE ENCOUNTER
PATIENT NEEDS LAB RESULTS DONE HERE      WANTS TO KNOW IF HE NEEDS TO STAY ON rosuvastatin (CRESTOR) 20 MG tablet

## 2019-09-25 ENCOUNTER — CLINICAL SUPPORT (OUTPATIENT)
Dept: FAMILY MEDICINE CLINIC | Facility: CLINIC | Age: 69
End: 2019-09-25

## 2019-09-25 PROCEDURE — G0008 ADMIN INFLUENZA VIRUS VAC: HCPCS | Performed by: INTERNAL MEDICINE

## 2019-09-25 PROCEDURE — 90653 IIV ADJUVANT VACCINE IM: CPT | Performed by: INTERNAL MEDICINE

## 2019-12-05 ENCOUNTER — OFFICE VISIT (OUTPATIENT)
Dept: FAMILY MEDICINE CLINIC | Facility: CLINIC | Age: 69
End: 2019-12-05

## 2019-12-05 VITALS
DIASTOLIC BLOOD PRESSURE: 74 MMHG | TEMPERATURE: 98 F | OXYGEN SATURATION: 100 % | HEART RATE: 76 BPM | WEIGHT: 212.6 LBS | BODY MASS INDEX: 28.79 KG/M2 | SYSTOLIC BLOOD PRESSURE: 118 MMHG | HEIGHT: 72 IN

## 2019-12-05 DIAGNOSIS — E55.9 VITAMIN D DEFICIENCY: ICD-10-CM

## 2019-12-05 DIAGNOSIS — I10 ESSENTIAL HYPERTENSION: Primary | ICD-10-CM

## 2019-12-05 DIAGNOSIS — E78.5 DYSLIPIDEMIA: ICD-10-CM

## 2019-12-05 LAB
25(OH)D3 SERPL-MCNC: 38.3 NG/ML (ref 30–100)
ALBUMIN SERPL-MCNC: 4.6 G/DL (ref 3.5–5.2)
ALBUMIN/GLOB SERPL: 1.4 G/DL
ALP SERPL-CCNC: 70 U/L (ref 39–117)
ALT SERPL W P-5'-P-CCNC: 25 U/L (ref 1–41)
ANION GAP SERPL CALCULATED.3IONS-SCNC: 11.1 MMOL/L (ref 5–15)
AST SERPL-CCNC: 25 U/L (ref 1–40)
BILIRUB SERPL-MCNC: 1.1 MG/DL (ref 0.2–1.2)
BUN BLD-MCNC: 15 MG/DL (ref 8–23)
BUN/CREAT SERPL: 15 (ref 7–25)
CALCIUM SPEC-SCNC: 9.5 MG/DL (ref 8.6–10.5)
CHLORIDE SERPL-SCNC: 105 MMOL/L (ref 98–107)
CHOLEST SERPL-MCNC: 104 MG/DL (ref 0–200)
CO2 SERPL-SCNC: 25.9 MMOL/L (ref 22–29)
CREAT BLD-MCNC: 1 MG/DL (ref 0.76–1.27)
DEPRECATED RDW RBC AUTO: 41.7 FL (ref 37–54)
ERYTHROCYTE [DISTWIDTH] IN BLOOD BY AUTOMATED COUNT: 12.3 % (ref 12.3–15.4)
GFR SERPL CREATININE-BSD FRML MDRD: 74 ML/MIN/1.73
GLOBULIN UR ELPH-MCNC: 3.3 GM/DL
GLUCOSE BLD-MCNC: 102 MG/DL (ref 65–99)
HCT VFR BLD AUTO: 41.6 % (ref 37.5–51)
HDLC SERPL-MCNC: 40 MG/DL (ref 40–60)
HGB BLD-MCNC: 14.1 G/DL (ref 13–17.7)
LDLC SERPL CALC-MCNC: 47 MG/DL (ref 0–100)
LDLC/HDLC SERPL: 1.18 {RATIO}
MAGNESIUM SERPL-MCNC: 2.2 MG/DL (ref 1.6–2.4)
MCH RBC QN AUTO: 31.1 PG (ref 26.6–33)
MCHC RBC AUTO-ENTMCNC: 33.9 G/DL (ref 31.5–35.7)
MCV RBC AUTO: 91.6 FL (ref 79–97)
PLATELET # BLD AUTO: 272 10*3/MM3 (ref 140–450)
PMV BLD AUTO: 10.3 FL (ref 6–12)
POTASSIUM BLD-SCNC: 4.1 MMOL/L (ref 3.5–5.2)
PROT SERPL-MCNC: 7.9 G/DL (ref 6–8.5)
RBC # BLD AUTO: 4.54 10*6/MM3 (ref 4.14–5.8)
SODIUM BLD-SCNC: 142 MMOL/L (ref 136–145)
TRIGL SERPL-MCNC: 85 MG/DL (ref 0–150)
VLDLC SERPL-MCNC: 17 MG/DL (ref 5–40)
WBC NRBC COR # BLD: 6.22 10*3/MM3 (ref 3.4–10.8)

## 2019-12-05 PROCEDURE — 82306 VITAMIN D 25 HYDROXY: CPT | Performed by: INTERNAL MEDICINE

## 2019-12-05 PROCEDURE — 83735 ASSAY OF MAGNESIUM: CPT | Performed by: INTERNAL MEDICINE

## 2019-12-05 PROCEDURE — 36415 COLL VENOUS BLD VENIPUNCTURE: CPT | Performed by: INTERNAL MEDICINE

## 2019-12-05 PROCEDURE — 99214 OFFICE O/P EST MOD 30 MIN: CPT | Performed by: INTERNAL MEDICINE

## 2019-12-05 PROCEDURE — 80061 LIPID PANEL: CPT | Performed by: INTERNAL MEDICINE

## 2019-12-05 PROCEDURE — 85027 COMPLETE CBC AUTOMATED: CPT | Performed by: INTERNAL MEDICINE

## 2019-12-05 PROCEDURE — 80053 COMPREHEN METABOLIC PANEL: CPT | Performed by: INTERNAL MEDICINE

## 2019-12-05 NOTE — PROGRESS NOTES
Subjective   Jose Luis Davis is a 69 y.o. male.     History of Present Illness   Patient was seen for hypertension.  Blood pressures been running 110s over 70s.  Patient lipids are being treated with diet exercise and statin.  Patient does have a vitamin D deficiency supplemented with over-the-counter vitamin D.  Patient's previous LDL was 46, HDL 45, triglycerides .  Patient was instructed to continue his present level of dieting and activity    Dictated utilizing Dragon dictation. If there are questions or for further clarification, please contact me.  The following portions of the patient's history were reviewed and updated as appropriate: allergies, current medications, past family history, past medical history, past social history, past surgical history and problem list.    Review of Systems   Constitutional: Negative for fatigue and fever.   HENT: Positive for congestion. Negative for trouble swallowing.    Eyes: Negative for discharge and visual disturbance.   Respiratory: Negative for choking and shortness of breath.    Cardiovascular: Negative for chest pain and palpitations.   Gastrointestinal: Negative for abdominal pain and blood in stool.   Endocrine: Negative.    Genitourinary: Negative for genital sores and hematuria.   Musculoskeletal: Negative for gait problem and joint swelling.   Skin: Negative for color change, pallor, rash and wound.   Allergic/Immunologic: Positive for environmental allergies. Negative for immunocompromised state.   Neurological: Negative for facial asymmetry and speech difficulty.   Psychiatric/Behavioral: Negative for hallucinations and suicidal ideas.       Objective   Physical Exam   Constitutional: He is oriented to person, place, and time. He appears well-developed and well-nourished.   HENT:   Head: Normocephalic.   Eyes: Conjunctivae are normal. Pupils are equal, round, and reactive to light.   Neck: Normal range of motion. Neck supple.   Cardiovascular: Normal rate,  regular rhythm and normal heart sounds.   Pulmonary/Chest: Effort normal and breath sounds normal.   Abdominal: Soft. Bowel sounds are normal.   Musculoskeletal: Normal range of motion.   Neurological: He is alert and oriented to person, place, and time.   Skin: Skin is warm and dry.   Psychiatric: He has a normal mood and affect. His behavior is normal. Judgment and thought content normal.   Nursing note and vitals reviewed.      Assessment/Plan #1 continue present level of dieting and activity 2 labs #3 follow-up in 4 days return to clinic in 6 months  Jose Luis was seen today for hypertension and needs bloodwork.    Diagnoses and all orders for this visit:    Essential hypertension  -     CBC (No Diff)  -     Comprehensive Metabolic Panel  -     Lipid Panel  -     Vitamin D 25 Hydroxy  -     Magnesium    Dyslipidemia  -     CBC (No Diff)  -     Comprehensive Metabolic Panel  -     Lipid Panel  -     Vitamin D 25 Hydroxy  -     Magnesium    Vitamin D deficiency   -     CBC (No Diff)  -     Comprehensive Metabolic Panel  -     Lipid Panel  -     Vitamin D 25 Hydroxy  -     Magnesium

## 2019-12-09 RX ORDER — ROSUVASTATIN CALCIUM 40 MG/1
TABLET, COATED ORAL
Qty: 90 TABLET | Refills: 0 | Status: SHIPPED | OUTPATIENT
Start: 2019-12-09 | End: 2020-02-28

## 2020-02-28 RX ORDER — ROSUVASTATIN CALCIUM 40 MG/1
TABLET, COATED ORAL
Qty: 90 TABLET | Refills: 0 | Status: SHIPPED | OUTPATIENT
Start: 2020-02-28 | End: 2020-06-01

## 2020-06-01 RX ORDER — ROSUVASTATIN CALCIUM 40 MG/1
TABLET, COATED ORAL
Qty: 90 TABLET | Refills: 0 | Status: SHIPPED | OUTPATIENT
Start: 2020-06-01 | End: 2020-08-31

## 2020-08-31 RX ORDER — ROSUVASTATIN CALCIUM 40 MG/1
TABLET, COATED ORAL
Qty: 90 TABLET | Refills: 0 | Status: SHIPPED | OUTPATIENT
Start: 2020-08-31 | End: 2020-11-30

## 2020-09-25 ENCOUNTER — OFFICE VISIT (OUTPATIENT)
Dept: FAMILY MEDICINE CLINIC | Facility: CLINIC | Age: 70
End: 2020-09-25

## 2020-09-25 VITALS
WEIGHT: 208.4 LBS | OXYGEN SATURATION: 99 % | SYSTOLIC BLOOD PRESSURE: 132 MMHG | HEART RATE: 92 BPM | DIASTOLIC BLOOD PRESSURE: 78 MMHG | TEMPERATURE: 98 F | HEIGHT: 72 IN | BODY MASS INDEX: 28.23 KG/M2

## 2020-09-25 DIAGNOSIS — M15.9 OSTEOARTHRITIS OF MULTIPLE JOINTS, UNSPECIFIED OSTEOARTHRITIS TYPE: ICD-10-CM

## 2020-09-25 DIAGNOSIS — Z12.5 PROSTATE CANCER SCREENING: ICD-10-CM

## 2020-09-25 DIAGNOSIS — E83.42 HYPOMAGNESEMIA: ICD-10-CM

## 2020-09-25 DIAGNOSIS — I10 ESSENTIAL HYPERTENSION: ICD-10-CM

## 2020-09-25 DIAGNOSIS — Z00.00 MEDICARE ANNUAL WELLNESS VISIT, SUBSEQUENT: Primary | ICD-10-CM

## 2020-09-25 DIAGNOSIS — E78.5 DYSLIPIDEMIA: ICD-10-CM

## 2020-09-25 DIAGNOSIS — E55.9 VITAMIN D DEFICIENCY, UNSPECIFIED: ICD-10-CM

## 2020-09-25 LAB
25(OH)D3 SERPL-MCNC: 42.8 NG/ML (ref 30–100)
ALBUMIN SERPL-MCNC: 4.7 G/DL (ref 3.5–5.2)
ALBUMIN/GLOB SERPL: 1.5 G/DL
ALP SERPL-CCNC: 78 U/L (ref 39–117)
ALT SERPL W P-5'-P-CCNC: 33 U/L (ref 1–41)
ANION GAP SERPL CALCULATED.3IONS-SCNC: 12.9 MMOL/L (ref 5–15)
AST SERPL-CCNC: 29 U/L (ref 1–40)
BILIRUB SERPL-MCNC: 1.1 MG/DL (ref 0–1.2)
BUN SERPL-MCNC: 20 MG/DL (ref 8–23)
BUN/CREAT SERPL: 20 (ref 7–25)
CALCIUM SPEC-SCNC: 9.6 MG/DL (ref 8.6–10.5)
CHLORIDE SERPL-SCNC: 103 MMOL/L (ref 98–107)
CHOLEST SERPL-MCNC: 111 MG/DL (ref 0–200)
CO2 SERPL-SCNC: 25.1 MMOL/L (ref 22–29)
CREAT SERPL-MCNC: 1 MG/DL (ref 0.76–1.27)
DEPRECATED RDW RBC AUTO: 42.4 FL (ref 37–54)
ERYTHROCYTE [DISTWIDTH] IN BLOOD BY AUTOMATED COUNT: 12.4 % (ref 12.3–15.4)
GFR SERPL CREATININE-BSD FRML MDRD: 74 ML/MIN/1.73
GLOBULIN UR ELPH-MCNC: 3.1 GM/DL
GLUCOSE SERPL-MCNC: 96 MG/DL (ref 65–99)
HCT VFR BLD AUTO: 42.5 % (ref 37.5–51)
HDLC SERPL-MCNC: 42 MG/DL (ref 40–60)
HGB BLD-MCNC: 14.7 G/DL (ref 13–17.7)
LDLC SERPL CALC-MCNC: 56 MG/DL (ref 0–100)
LDLC/HDLC SERPL: 1.34 {RATIO}
MAGNESIUM SERPL-MCNC: 2.2 MG/DL (ref 1.6–2.4)
MCH RBC QN AUTO: 32.2 PG (ref 26.6–33)
MCHC RBC AUTO-ENTMCNC: 34.6 G/DL (ref 31.5–35.7)
MCV RBC AUTO: 93 FL (ref 79–97)
PLATELET # BLD AUTO: 281 10*3/MM3 (ref 140–450)
PMV BLD AUTO: 10.2 FL (ref 6–12)
POTASSIUM SERPL-SCNC: 4.2 MMOL/L (ref 3.5–5.2)
PROT SERPL-MCNC: 7.8 G/DL (ref 6–8.5)
PSA SERPL-MCNC: 3.78 NG/ML (ref 0–4)
RBC # BLD AUTO: 4.57 10*6/MM3 (ref 4.14–5.8)
SODIUM SERPL-SCNC: 141 MMOL/L (ref 136–145)
TRIGL SERPL-MCNC: 64 MG/DL (ref 0–150)
VLDLC SERPL-MCNC: 12.8 MG/DL (ref 5–40)
WBC # BLD AUTO: 6.3 10*3/MM3 (ref 3.4–10.8)

## 2020-09-25 PROCEDURE — 80053 COMPREHEN METABOLIC PANEL: CPT | Performed by: INTERNAL MEDICINE

## 2020-09-25 PROCEDURE — 85027 COMPLETE CBC AUTOMATED: CPT | Performed by: INTERNAL MEDICINE

## 2020-09-25 PROCEDURE — 83735 ASSAY OF MAGNESIUM: CPT | Performed by: INTERNAL MEDICINE

## 2020-09-25 PROCEDURE — 99214 OFFICE O/P EST MOD 30 MIN: CPT | Performed by: INTERNAL MEDICINE

## 2020-09-25 PROCEDURE — G0439 PPPS, SUBSEQ VISIT: HCPCS | Performed by: INTERNAL MEDICINE

## 2020-09-25 PROCEDURE — G0103 PSA SCREENING: HCPCS | Performed by: INTERNAL MEDICINE

## 2020-09-25 PROCEDURE — 82306 VITAMIN D 25 HYDROXY: CPT | Performed by: INTERNAL MEDICINE

## 2020-09-25 PROCEDURE — 36415 COLL VENOUS BLD VENIPUNCTURE: CPT | Performed by: INTERNAL MEDICINE

## 2020-09-25 PROCEDURE — 80061 LIPID PANEL: CPT | Performed by: INTERNAL MEDICINE

## 2020-09-25 NOTE — PROGRESS NOTES
Subsequent Medicare Wellness Visit   The ABC's of the Annual Wellness Visit    Chief Complaint   Patient presents with   • Hypertension     6 month f/u   • Hyperlipidemia     BLOODWORK TIME       HPI:  Jose Luis Davis, -1950, is a 70 y.o. male who presents for a Subsequent Medicare Wellness Visit.  Patient was seen for Medicare wellness exam.  Patient was seen for hypertension.  Pressures been running 130s over 80s.  Patient's lipids are being treated with diet exercise and a statin.  Triglycerides 85, HDL 40, LDL 47.  Patient will continue his present treatment.  Patient's osteoarthritis is being managed with his NSAID.  Patient  Is active daily.  Patient has a vitamin D deficiency and is supplementing with over-the-counter D3.  Patient is taking magnesium and co-Q10 for headache control and levels are being drawn.    Dictated utilizing Dragon dictation. If there are questions or for further clarification, please contact me.    Recent Hospitalizations:  No hospitalization(s) within the last year..    Current Medical Providers:  Patient Care Team:  Smith Cabello MD as PCP - General (Internal Medicine)  Smith Cabello MD as PCP - Claims Attributed  Nathan Freeman Jr., MD as Consulting Physician (Urology)    Health Habits and Functional and Cognitive Screening and Depression Screening:  Functional & Cognitive Status 2020   Do you have difficulty preparing food and eating? No   Do you have difficulty bathing yourself, getting dressed or grooming yourself? No   Do you have difficulty using the toilet? No   Do you have difficulty moving around from place to place? No   Do you have trouble with steps or getting out of a bed or a chair? No   Current Diet Well Balanced Diet   Dental Exam Up to date   Eye Exam Up to date   Exercise (times per week) 7 times per week   Current Exercise Activities Include Aerobics   Do you need help using the phone?  No   Are you deaf or do you have serious difficulty  hearing?  No   Do you need help with transportation? No   Do you need help shopping? No   Do you need help preparing meals?  No   Do you need help with housework?  No   Do you need help with laundry? No   Do you need help taking your medications? No   Do you need help managing money? No   Do you ever drive or ride in a car without wearing a seat belt? No   Have you felt unusual stress, anger or loneliness in the last month? No   Who do you live with? Alone   If you need help, do you have trouble finding someone available to you? No   Have you been bothered in the last four weeks by sexual problems? No   Do you have difficulty concentrating, remembering or making decisions? No       Compared to one year ago, the patient feels his physical health is the same and his mental health is the same.    Depression Screen:  PHQ-2/PHQ-9 Depression Screening 9/25/2020   Little interest or pleasure in doing things 0   Feeling down, depressed, or hopeless 0   Trouble falling or staying asleep, or sleeping too much 0   Feeling tired or having little energy 0   Poor appetite or overeating 0   Feeling bad about yourself - or that you are a failure or have let yourself or your family down 0   Trouble concentrating on things, such as reading the newspaper or watching television 0   Moving or speaking so slowly that other people could have noticed. Or the opposite - being so fidgety or restless that you have been moving around a lot more than usual 0   Thoughts that you would be better off dead, or of hurting yourself in some way 0   Total Score 0   If you checked off any problems, how difficult have these problems made it for you to do your work, take care of things at home, or get along with other people? Not difficult at all         Past Medical/Family/Social History:  The following portions of the patient's history were reviewed and updated as appropriate: allergies, current medications, past family history, past medical history,  past social history, past surgical history and problem list.    No Known Allergies      Current Outpatient Medications:   •  Coenzyme Q10 (CO Q 10 PO), Take  by mouth 2 (Two) Times a Day., Disp: , Rfl:   •  indomethacin (INDOCIN) 50 MG capsule, Take 50 mg by mouth 2 (Two) Times a Day As Needed (headache). bid, Disp: , Rfl:   •  magnesium gluconate (MAGONATE) 500 MG tablet, Take 27 mg by mouth Daily., Disp: , Rfl:   •  Multiple Vitamins-Minerals (MULTIVITAMIN ADULT PO), Take 1 tablet/day by mouth., Disp: , Rfl:   •  rosuvastatin (CRESTOR) 40 MG tablet, TAKE ONE TABLET BY MOUTH DAILY, Disp: 90 tablet, Rfl: 0  •  Saw Palmetto 450 MG capsule, Take  by mouth 3 (Three) Times a Day., Disp: , Rfl:     Aspirin use counseling: Does not need ASA (and currently is not on it)    Current medication list contains no high risk medications.  No harmful drug interactions have been identified.     History reviewed. No pertinent family history.    Social History     Tobacco Use   • Smoking status: Former Smoker   • Tobacco comment: quit about 30 yrs ago   Substance Use Topics   • Alcohol use: Yes     Alcohol/week: 6.0 standard drinks     Types: 6 Cans of beer per week       Past Surgical History:   Procedure Laterality Date   • CYSTOSCOPY URETEROSCOPY LASER LITHOTRIPSY Left 3/12/2016    Procedure: Cyto, Trans urethral left ureterral orifice incision, left ureteroscopy, holmium laser lithotripsy, basket extraction of stone fragment, placement of left double J ureteral stent. ;  Surgeon: Nathan Freeman Jr., MD;  Location: [facility];  Service:        Patient Active Problem List   Diagnosis   • Ureterolithiasis   • Benign nodular prostatic hyperplasia   • Essential hypertension   • Dyslipidemia   • Osteoarthritis of multiple joints   • Tension type headache   • Migraine without aura       Review of Systems   Constitutional: Negative for fatigue and fever.   HENT: Positive for congestion. Negative for trouble swallowing.    Eyes:  "Negative for discharge and visual disturbance.   Respiratory: Negative for choking and shortness of breath.    Cardiovascular: Negative for chest pain and palpitations.   Gastrointestinal: Negative for abdominal pain and blood in stool.   Endocrine: Negative.    Genitourinary: Negative for genital sores and hematuria.   Musculoskeletal: Negative for gait problem and joint swelling.   Skin: Negative for color change, pallor, rash and wound.   Allergic/Immunologic: Positive for environmental allergies. Negative for immunocompromised state.   Neurological: Negative for facial asymmetry and speech difficulty.   Psychiatric/Behavioral: Negative for hallucinations and suicidal ideas.       Objective     Vitals:    09/25/20 0857   BP: 132/78   Pulse: 92   Temp: 98 °F (36.7 °C)   SpO2: 99%   Weight: 94.5 kg (208 lb 6.4 oz)   Height: 182.9 cm (72\")       Patient's Body mass index is 28.26 kg/m². BMI is within normal parameters. No follow-up required..      No exam data present    The patient has no evidence of cognitve impairment.     Physical Exam  Vitals signs and nursing note reviewed.   Constitutional:       Appearance: Normal appearance. He is well-developed.   HENT:      Head: Normocephalic and atraumatic.      Nose: Nose normal.      Mouth/Throat:      Mouth: Mucous membranes are moist.      Pharynx: Oropharynx is clear.   Eyes:      Extraocular Movements: Extraocular movements intact.      Conjunctiva/sclera: Conjunctivae normal.      Pupils: Pupils are equal, round, and reactive to light.   Neck:      Musculoskeletal: Normal range of motion and neck supple.   Cardiovascular:      Rate and Rhythm: Normal rate and regular rhythm.      Heart sounds: Normal heart sounds. No murmur. No friction rub. No gallop.    Pulmonary:      Effort: Pulmonary effort is normal. No respiratory distress.      Breath sounds: Normal breath sounds. No stridor. No wheezing, rhonchi or rales.   Chest:      Chest wall: No tenderness. "   Abdominal:      General: Bowel sounds are normal.      Palpations: Abdomen is soft.   Musculoskeletal: Normal range of motion.   Skin:     General: Skin is warm and dry.   Neurological:      General: No focal deficit present.      Mental Status: He is alert and oriented to person, place, and time. Mental status is at baseline.   Psychiatric:         Mood and Affect: Mood normal.         Behavior: Behavior normal.         Thought Content: Thought content normal.         Judgment: Judgment normal.         Recent Lab Results:     Lab Results   Component Value Date    CHOL 104 12/05/2019    TRIG 85 12/05/2019    HDL 40 12/05/2019    VLDL 17 12/05/2019    LDLHDL 1.18 12/05/2019       Assessment/Plan #1 continue present diet and activity levels #2 continue to monitor blood pressure #3 follow-up in 6 months  Age-appropriate Screening Schedule:  Refer to the list below for future screening recommendations based on patient's age, sex and/or medical conditions.      Health Maintenance   Topic Date Due   • ZOSTER VACCINE (2 of 2) 06/23/2017   • INFLUENZA VACCINE  08/01/2020   • COLONOSCOPY  04/28/2027   • TDAP/TD VACCINES (2 - Td) 04/28/2027       Medicare Risks and Personalized Health Plan:  NA      CMS-Preventive Services Quick Reference  Medicare Preventive Services Addressed:  NA    Advance Care Planning:  ACP discussion was held with the patient during this visit. Patient has an advance directive in EMR which is still valid.     Diagnoses and all orders for this visit:    1. Medicare annual wellness visit, subsequent (Primary)    2. Essential hypertension  -     CBC (No Diff); Future  -     Comprehensive Metabolic Panel  -     Lipid Panel  -     Vitamin D 25 Hydroxy  -     PSA Screen; Future  -     CBC (No Diff)  -     PSA Screen    3. Dyslipidemia  -     CBC (No Diff); Future  -     Comprehensive Metabolic Panel  -     Lipid Panel  -     Vitamin D 25 Hydroxy  -     PSA Screen; Future  -     CBC (No Diff)  -     PSA  Screen    4. Osteoarthritis of multiple joints, unspecified osteoarthritis type  -     CBC (No Diff); Future  -     Comprehensive Metabolic Panel  -     Lipid Panel  -     Vitamin D 25 Hydroxy  -     PSA Screen; Future  -     CBC (No Diff)  -     PSA Screen    5. Prostate cancer screening  -     CBC (No Diff); Future  -     Comprehensive Metabolic Panel  -     Lipid Panel  -     Vitamin D 25 Hydroxy  -     PSA Screen; Future  -     CBC (No Diff)  -     PSA Screen    6. Vitamin D deficiency, unspecified   -     Vitamin D 25 Hydroxy    7. Hypomagnesemia  -     Magnesium        An After Visit Summary and PPPS with all of these plans were given to the patient.      Follow Up:  Return in about 6 months (around 3/25/2021), or if symptoms worsen or fail to improve, for Recheck.

## 2020-09-25 NOTE — PROGRESS NOTES
Left message to call back. Please transfer to triage. 1st attempt. Marcos Rogers please see pt mychart message below. Subjective   Jose Luis Davis is a 68 y.o. male.     History of Present Illness   Patient was seen for Medicare wellness exam.  She was seen for migraines.  She was seeing an endocrinologist and took him off his Inderal.  Patient was placed on alternative medicines and is doing well at present time.  His blood pressures been running 130s over 70s.  His lipids controlled with diet exercise and a statin.  Patient did have labs today follow-up in 4 days.  Return to clinic in 6 months.    Dictated utilizing Dragon dictation. If there are questions or for further clarification, please contact me.  The following portions of the patient's history were reviewed and updated as appropriate: allergies, current medications, past family history, past medical history, past social history, past surgical history and problem list.    Review of Systems   Constitutional: Negative for fatigue and fever.   HENT: Positive for congestion. Negative for trouble swallowing.    Eyes: Negative for discharge and visual disturbance.   Respiratory: Negative for choking and shortness of breath.    Cardiovascular: Negative for chest pain and palpitations.   Gastrointestinal: Negative for abdominal pain and blood in stool.   Endocrine: Negative.    Genitourinary: Negative for genital sores and hematuria.   Musculoskeletal: Negative for gait problem and joint swelling.   Skin: Negative for color change, pallor, rash and wound.   Allergic/Immunologic: Positive for environmental allergies. Negative for immunocompromised state.   Neurological: Positive for headaches. Negative for facial asymmetry and speech difficulty.   Psychiatric/Behavioral: Negative for hallucinations and suicidal ideas.       Objective   Physical Exam   Constitutional: He is oriented to person, place, and time. He appears well-developed and well-nourished.   HENT:   Head: Normocephalic.   Eyes: Conjunctivae are normal. Pupils are equal, round, and reactive to light.   Neck: Normal  range of motion. Neck supple.   Cardiovascular: Normal rate, regular rhythm and normal heart sounds.   Pulmonary/Chest: Effort normal and breath sounds normal.   Abdominal: Soft. Bowel sounds are normal.   Musculoskeletal: Normal range of motion.   Neurological: He is alert and oriented to person, place, and time.   Skin: Skin is warm and dry.   Psychiatric: He has a normal mood and affect. His behavior is normal. Judgment and thought content normal.   Nursing note and vitals reviewed.      Assessment/Plan   Problems Addressed this Visit        Cardiovascular and Mediastinum    Essential hypertension    Relevant Orders    CBC (No Diff) (Completed)    Comprehensive Metabolic Panel (Completed)    Lipid Panel (Completed)    Vitamin D 25 Hydroxy (Completed)    Magnesium (Completed)    Migraine without aura - Primary    Relevant Orders    CBC (No Diff) (Completed)    Comprehensive Metabolic Panel (Completed)    Lipid Panel (Completed)    Vitamin D 25 Hydroxy (Completed)    Magnesium (Completed)       Other    Dyslipidemia    Relevant Orders    CBC (No Diff) (Completed)    Comprehensive Metabolic Panel (Completed)    Lipid Panel (Completed)    Vitamin D 25 Hydroxy (Completed)    Magnesium (Completed)      Other Visit Diagnoses     Vitamin D deficiency         Relevant Orders    Vitamin D 25 Hydroxy (Completed)    Magnesium (Completed)    Prostate cancer screening        Relevant Orders    PSA Screen (Completed)

## 2020-09-25 NOTE — PATIENT INSTRUCTIONS
Medicare Wellness  Personal Prevention Plan of Service     Date of Office Visit:  2020  Encounter Provider:  Smith Cabello MD  Place of Service:  [facility]  Patient Name: Jose Luis Davis  :  1950    As part of the Medicare Wellness portion of your visit today, we are providing you with this personalized preventive plan of services (PPPS). This plan is based upon recommendations of the [place] Preventive Services Task Force (USPSTF) and the Advisory Committee on Immunization Practices (ACIP).    This lists the preventive care services that should be considered, and provides dates of when you are due. Items listed as completed are up-to-date and do not require any further intervention.    Health Maintenance   Topic Date Due   • HEPATITIS C SCREENING  2017   • ZOSTER VACCINE (2 of 2) 2017   • INFLUENZA VACCINE  2020   • MEDICARE ANNUAL WELLNESS  2021   • COLONOSCOPY  2027   • TDAP/TD VACCINES (2 - Td) 2027   • AAA SCREEN (ONE-TIME)  Completed       Orders Placed This Encounter   Procedures   • CBC (No Diff)     Standing Status:   Future     Standing Expiration Date:   2021   • Comprehensive Metabolic Panel   • Lipid Panel   • Vitamin D 25 Hydroxy   • PSA Screen     Standing Status:   Future     Standing Expiration Date:   2021   • Magnesium       No follow-ups on file.

## 2020-11-30 RX ORDER — ROSUVASTATIN CALCIUM 40 MG/1
TABLET, COATED ORAL
Qty: 90 TABLET | Refills: 0 | Status: SHIPPED | OUTPATIENT
Start: 2020-11-30 | End: 2021-02-25

## 2021-02-25 RX ORDER — ROSUVASTATIN CALCIUM 40 MG/1
TABLET, COATED ORAL
Qty: 90 TABLET | Refills: 0 | Status: SHIPPED | OUTPATIENT
Start: 2021-02-25 | End: 2021-05-27

## 2021-03-16 DIAGNOSIS — E55.9 VITAMIN D DEFICIENCY: ICD-10-CM

## 2021-03-16 DIAGNOSIS — I10 ESSENTIAL HYPERTENSION: Primary | ICD-10-CM

## 2021-03-16 DIAGNOSIS — E78.5 DYSLIPIDEMIA: ICD-10-CM

## 2021-03-18 ENCOUNTER — LAB (OUTPATIENT)
Dept: FAMILY MEDICINE CLINIC | Facility: CLINIC | Age: 71
End: 2021-03-18

## 2021-03-18 DIAGNOSIS — E55.9 VITAMIN D DEFICIENCY: ICD-10-CM

## 2021-03-18 DIAGNOSIS — E78.5 DYSLIPIDEMIA: ICD-10-CM

## 2021-03-18 DIAGNOSIS — I10 ESSENTIAL HYPERTENSION: ICD-10-CM

## 2021-03-18 LAB
25(OH)D3 SERPL-MCNC: 48.5 NG/ML (ref 30–100)
ALBUMIN SERPL-MCNC: 4.6 G/DL (ref 3.5–5.2)
ALBUMIN/GLOB SERPL: 1.9 G/DL
ALP SERPL-CCNC: 69 U/L (ref 39–117)
ALT SERPL W P-5'-P-CCNC: 31 U/L (ref 1–41)
ANION GAP SERPL CALCULATED.3IONS-SCNC: 10 MMOL/L (ref 5–15)
AST SERPL-CCNC: 26 U/L (ref 1–40)
BILIRUB SERPL-MCNC: 1.1 MG/DL (ref 0–1.2)
BUN SERPL-MCNC: 15 MG/DL (ref 8–23)
BUN/CREAT SERPL: 14.2 (ref 7–25)
CALCIUM SPEC-SCNC: 9.4 MG/DL (ref 8.6–10.5)
CHLORIDE SERPL-SCNC: 105 MMOL/L (ref 98–107)
CHOLEST SERPL-MCNC: 110 MG/DL (ref 0–200)
CO2 SERPL-SCNC: 29 MMOL/L (ref 22–29)
CREAT SERPL-MCNC: 1.06 MG/DL (ref 0.76–1.27)
DEPRECATED RDW RBC AUTO: 41.3 FL (ref 37–54)
ERYTHROCYTE [DISTWIDTH] IN BLOOD BY AUTOMATED COUNT: 12.1 % (ref 12.3–15.4)
GFR SERPL CREATININE-BSD FRML MDRD: 69 ML/MIN/1.73
GLOBULIN UR ELPH-MCNC: 2.4 GM/DL
GLUCOSE SERPL-MCNC: 99 MG/DL (ref 65–99)
HCT VFR BLD AUTO: 42.1 % (ref 37.5–51)
HDLC SERPL-MCNC: 40 MG/DL (ref 40–60)
HGB BLD-MCNC: 14.9 G/DL (ref 13–17.7)
LDLC SERPL CALC-MCNC: 51 MG/DL (ref 0–100)
LDLC/HDLC SERPL: 1.24 {RATIO}
MCH RBC QN AUTO: 33 PG (ref 26.6–33)
MCHC RBC AUTO-ENTMCNC: 35.4 G/DL (ref 31.5–35.7)
MCV RBC AUTO: 93.1 FL (ref 79–97)
PLATELET # BLD AUTO: 252 10*3/MM3 (ref 140–450)
PMV BLD AUTO: 10.5 FL (ref 6–12)
POTASSIUM SERPL-SCNC: 4.9 MMOL/L (ref 3.5–5.2)
PROT SERPL-MCNC: 7 G/DL (ref 6–8.5)
RBC # BLD AUTO: 4.52 10*6/MM3 (ref 4.14–5.8)
SODIUM SERPL-SCNC: 144 MMOL/L (ref 136–145)
TRIGL SERPL-MCNC: 102 MG/DL (ref 0–150)
VLDLC SERPL-MCNC: 19 MG/DL (ref 5–40)
WBC # BLD AUTO: 5.82 10*3/MM3 (ref 3.4–10.8)

## 2021-03-18 PROCEDURE — 82306 VITAMIN D 25 HYDROXY: CPT | Performed by: INTERNAL MEDICINE

## 2021-03-18 PROCEDURE — 80053 COMPREHEN METABOLIC PANEL: CPT | Performed by: INTERNAL MEDICINE

## 2021-03-18 PROCEDURE — 36415 COLL VENOUS BLD VENIPUNCTURE: CPT | Performed by: INTERNAL MEDICINE

## 2021-03-18 PROCEDURE — 85027 COMPLETE CBC AUTOMATED: CPT | Performed by: INTERNAL MEDICINE

## 2021-03-18 PROCEDURE — 80061 LIPID PANEL: CPT | Performed by: INTERNAL MEDICINE

## 2021-03-25 ENCOUNTER — OFFICE VISIT (OUTPATIENT)
Dept: FAMILY MEDICINE CLINIC | Facility: CLINIC | Age: 71
End: 2021-03-25

## 2021-03-25 VITALS — SYSTOLIC BLOOD PRESSURE: 120 MMHG | DIASTOLIC BLOOD PRESSURE: 80 MMHG

## 2021-03-25 DIAGNOSIS — E78.5 DYSLIPIDEMIA: ICD-10-CM

## 2021-03-25 DIAGNOSIS — M15.3 POST-TRAUMATIC OSTEOARTHRITIS OF MULTIPLE JOINTS: ICD-10-CM

## 2021-03-25 DIAGNOSIS — I10 ESSENTIAL HYPERTENSION: Primary | ICD-10-CM

## 2021-03-25 PROCEDURE — 99443 PR PHYS/QHP TELEPHONE EVALUATION 21-30 MIN: CPT | Performed by: INTERNAL MEDICINE

## 2021-03-25 NOTE — PROGRESS NOTES
Subjective   Jose Luis Davis is a 70 y.o. male.     Vitals:    03/25/21 1116   BP: 120/80      There is no height or weight on file to calculate BMI.     History of Present Illness   You have chosen to receive care through a telephone visit. Do you consent to use a telephone visit for your medical care today? Yes  Patient was seen for 22-minute phone encounter.  Patient was seen for hypertension.  Blood pressures been running 120s over 80s.  Patient does diet and exercise been able to get off his blood pressure medication.  Patient will continue to monitor at home.  Patient does have osteoarthritis.  It has been controlled with Indocin 50 mg twice daily.  Also has a diet and exercise routine.  Patient's lipids controlled with his diet exercise and rosuvastatin 40 mg daily.  Triglycerides 64 HDL 46 LDL 52 patient will continue present treatment.    Dictated utilizing Dragon dictation. If there are questions or for further clarification, please contact me.    The following portions of the patient's history were reviewed and updated as appropriate: allergies, current medications, past family history, past medical history, past social history, past surgical history and problem list.    Review of Systems   Constitutional: Negative for fatigue and fever.   HENT: Positive for congestion. Negative for trouble swallowing.    Eyes: Negative for discharge and visual disturbance.   Respiratory: Negative for choking and shortness of breath.    Cardiovascular: Negative for chest pain and palpitations.   Gastrointestinal: Negative for abdominal pain and blood in stool.   Endocrine: Negative.    Genitourinary: Negative for genital sores and hematuria.   Musculoskeletal: Negative for gait problem and joint swelling.   Skin: Negative for color change, pallor, rash and wound.   Allergic/Immunologic: Positive for environmental allergies. Negative for immunocompromised state.   Neurological: Negative for facial asymmetry and speech  difficulty.   Psychiatric/Behavioral: Negative for hallucinations and suicidal ideas.       Objective   Physical Exam  Pulmonary:      Effort: Pulmonary effort is normal.      Breath sounds: Normal breath sounds.   Neurological:      General: No focal deficit present.      Mental Status: He is oriented to person, place, and time. Mental status is at baseline.   Psychiatric:         Mood and Affect: Mood normal.         Behavior: Behavior normal.         Thought Content: Thought content normal.         Judgment: Judgment normal.         Assessment/Plan #1 continue present diet and activity levels #2 continue atorvastatin No. 3 continue to monitor blood pressure at home #4 follow-up in 6 weeks  Problems Addressed this Visit     Cardiac and Vasculature            Essential hypertension - Primary    Dyslipidemia          Musculoskeletal and Injuries            Osteoarthritis of multiple joints            Diagnoses     Diagnosis Codes Comments    Essential hypertension    -  Primary ICD-10-CM: I10  ICD-9-CM: 401.9     Dyslipidemia     ICD-10-CM: E78.5  ICD-9-CM: 272.4     Post-traumatic osteoarthritis of multiple joints     ICD-10-CM: M15.3  ICD-9-CM: 715.89

## 2021-05-27 RX ORDER — ROSUVASTATIN CALCIUM 40 MG/1
TABLET, COATED ORAL
Qty: 90 TABLET | Refills: 1 | Status: SHIPPED | OUTPATIENT
Start: 2021-05-27 | End: 2021-11-22

## 2021-09-09 ENCOUNTER — OFFICE VISIT (OUTPATIENT)
Dept: FAMILY MEDICINE CLINIC | Facility: CLINIC | Age: 71
End: 2021-09-09

## 2021-09-09 VITALS — SYSTOLIC BLOOD PRESSURE: 120 MMHG | DIASTOLIC BLOOD PRESSURE: 60 MMHG

## 2021-09-09 DIAGNOSIS — E78.5 DYSLIPIDEMIA: Primary | ICD-10-CM

## 2021-09-09 DIAGNOSIS — Z12.5 PROSTATE CANCER SCREENING: ICD-10-CM

## 2021-09-09 DIAGNOSIS — E55.9 VITAMIN D DEFICIENCY: ICD-10-CM

## 2021-09-09 DIAGNOSIS — M15.3 POST-TRAUMATIC OSTEOARTHRITIS OF MULTIPLE JOINTS: ICD-10-CM

## 2021-09-09 PROCEDURE — 99443 PR PHYS/QHP TELEPHONE EVALUATION 21-30 MIN: CPT | Performed by: INTERNAL MEDICINE

## 2021-09-09 NOTE — PROGRESS NOTES
Subjective   Jose Luis Davis is a 71 y.o. male.     Vitals:    09/09/21 1232   BP: 120/60      There is no height or weight on file to calculate BMI.     History of Present Illness   Patient was seen for a 22-minute phone encounter.  Patient was seen for hyperlipidemia.  Patient is using diet exercise and rosuvastatin 40 mg daily.  Glycerides 112, HDL 40, LDL 51.  These labs were drawn 6 months ago and he had a new set ordered and will have them drawn when he thinks it safe from Covid.  Patient does have a vitamin D3 deficiency supplemented with over-the-counter D3.  Patient does have osteoarthritis and is using Indocin 50 mg twice daily as needed and low impact exercise for pain control.  Patient been doing well with this treatment.  Patient will continue present diet and activity levels and follow-up in 6 months.  Patient will have labs done as soon as possible.    Dictated utilizing Dragon dictation. If there are questions or for further clarification, please contact me.  The following portions of the patient's history were reviewed and updated as appropriate: allergies, current medications, past family history, past medical history, past social history, past surgical history and problem list.    Review of Systems   Constitutional: Negative for fatigue and fever.   HENT: Positive for congestion. Negative for trouble swallowing.    Eyes: Negative for discharge and visual disturbance.   Respiratory: Negative for choking and shortness of breath.    Cardiovascular: Negative for chest pain and palpitations.   Gastrointestinal: Negative for abdominal pain and blood in stool.   Endocrine: Negative.    Genitourinary: Negative for genital sores and hematuria.   Musculoskeletal: Positive for gait problem and joint swelling.   Skin: Negative for color change, pallor, rash and wound.   Allergic/Immunologic: Positive for environmental allergies. Negative for immunocompromised state.   Neurological: Negative for facial  asymmetry and speech difficulty.   Psychiatric/Behavioral: Negative for hallucinations and suicidal ideas.       Objective   Physical Exam  Pulmonary:      Effort: Pulmonary effort is normal.      Breath sounds: Normal breath sounds.   Neurological:      General: No focal deficit present.      Mental Status: He is oriented to person, place, and time. Mental status is at baseline.   Psychiatric:         Mood and Affect: Mood normal.         Behavior: Behavior normal.         Thought Content: Thought content normal.         Judgment: Judgment normal.         Assessment/Plan #1 continue present diet and activity levels #2 labs #3 continue Indocin  Problems Addressed this Visit     Cardiac and Vasculature            Dyslipidemia - Primary    Relevant Orders    CBC (No Diff)    Comprehensive Metabolic Panel    Lipid Panel    Vitamin D 25 Hydroxy    PSA Screen          Musculoskeletal and Injuries            Osteoarthritis of multiple joints    Relevant Orders    CBC (No Diff)    Comprehensive Metabolic Panel    Lipid Panel    Vitamin D 25 Hydroxy    PSA Screen            Other Visit Diagnoses     Prostate cancer screening        Relevant Orders    CBC (No Diff)    Comprehensive Metabolic Panel    Lipid Panel    Vitamin D 25 Hydroxy    PSA Screen    Vitamin D deficiency        Relevant Orders    CBC (No Diff)    Comprehensive Metabolic Panel    Lipid Panel    Vitamin D 25 Hydroxy    PSA Screen      Diagnoses     Diagnosis Codes Comments    Dyslipidemia    -  Primary ICD-10-CM: E78.5  ICD-9-CM: 272.4     Prostate cancer screening     ICD-10-CM: Z12.5  ICD-9-CM: V76.44     Vitamin D deficiency     ICD-10-CM: E55.9  ICD-9-CM: 268.9     Post-traumatic osteoarthritis of multiple joints     ICD-10-CM: M15.3  ICD-9-CM: 715.89

## 2021-11-22 RX ORDER — ROSUVASTATIN CALCIUM 40 MG/1
TABLET, COATED ORAL
Qty: 90 TABLET | Refills: 1 | Status: SHIPPED | OUTPATIENT
Start: 2021-11-22 | End: 2022-05-18

## 2022-03-16 ENCOUNTER — OFFICE VISIT (OUTPATIENT)
Dept: FAMILY MEDICINE CLINIC | Facility: CLINIC | Age: 72
End: 2022-03-16

## 2022-03-16 VITALS
DIASTOLIC BLOOD PRESSURE: 86 MMHG | SYSTOLIC BLOOD PRESSURE: 142 MMHG | WEIGHT: 216.4 LBS | TEMPERATURE: 99.3 F | HEIGHT: 72 IN | BODY MASS INDEX: 29.31 KG/M2 | OXYGEN SATURATION: 97 % | HEART RATE: 71 BPM

## 2022-03-16 DIAGNOSIS — E55.9 VITAMIN D DEFICIENCY: ICD-10-CM

## 2022-03-16 DIAGNOSIS — E83.42 HYPOMAGNESEMIA: ICD-10-CM

## 2022-03-16 DIAGNOSIS — E78.5 DYSLIPIDEMIA: ICD-10-CM

## 2022-03-16 DIAGNOSIS — Z12.5 PROSTATE CANCER SCREENING: ICD-10-CM

## 2022-03-16 DIAGNOSIS — Z00.00 MEDICARE ANNUAL WELLNESS VISIT, SUBSEQUENT: Primary | ICD-10-CM

## 2022-03-16 LAB
25(OH)D3 SERPL-MCNC: 43.3 NG/ML (ref 30–100)
ALBUMIN SERPL-MCNC: 4.9 G/DL (ref 3.5–5.2)
ALBUMIN/GLOB SERPL: 1.8 G/DL
ALP SERPL-CCNC: 79 U/L (ref 39–117)
ALT SERPL W P-5'-P-CCNC: 29 U/L (ref 1–41)
ANION GAP SERPL CALCULATED.3IONS-SCNC: 9 MMOL/L (ref 5–15)
AST SERPL-CCNC: 23 U/L (ref 1–40)
BILIRUB SERPL-MCNC: 0.9 MG/DL (ref 0–1.2)
BUN SERPL-MCNC: 20 MG/DL (ref 8–23)
BUN/CREAT SERPL: 22.2 (ref 7–25)
CALCIUM SPEC-SCNC: 9.9 MG/DL (ref 8.6–10.5)
CHLORIDE SERPL-SCNC: 104 MMOL/L (ref 98–107)
CHOLEST SERPL-MCNC: 126 MG/DL (ref 0–200)
CO2 SERPL-SCNC: 27 MMOL/L (ref 22–29)
CREAT SERPL-MCNC: 0.9 MG/DL (ref 0.76–1.27)
DEPRECATED RDW RBC AUTO: 41.5 FL (ref 37–54)
EGFRCR SERPLBLD CKD-EPI 2021: 91.9 ML/MIN/1.73
ERYTHROCYTE [DISTWIDTH] IN BLOOD BY AUTOMATED COUNT: 12.2 % (ref 12.3–15.4)
GLOBULIN UR ELPH-MCNC: 2.8 GM/DL
GLUCOSE SERPL-MCNC: 108 MG/DL (ref 65–99)
HCT VFR BLD AUTO: 43.3 % (ref 37.5–51)
HDLC SERPL-MCNC: 42 MG/DL (ref 40–60)
HGB BLD-MCNC: 14.9 G/DL (ref 13–17.7)
LDLC SERPL CALC-MCNC: 63 MG/DL (ref 0–100)
LDLC/HDLC SERPL: 1.46 {RATIO}
MCH RBC QN AUTO: 32.2 PG (ref 26.6–33)
MCHC RBC AUTO-ENTMCNC: 34.4 G/DL (ref 31.5–35.7)
MCV RBC AUTO: 93.5 FL (ref 79–97)
PLATELET # BLD AUTO: 289 10*3/MM3 (ref 140–450)
PMV BLD AUTO: 10.3 FL (ref 6–12)
POTASSIUM SERPL-SCNC: 5 MMOL/L (ref 3.5–5.2)
PROT SERPL-MCNC: 7.7 G/DL (ref 6–8.5)
PSA SERPL-MCNC: 5.6 NG/ML (ref 0–4)
RBC # BLD AUTO: 4.63 10*6/MM3 (ref 4.14–5.8)
SODIUM SERPL-SCNC: 140 MMOL/L (ref 136–145)
TRIGL SERPL-MCNC: 113 MG/DL (ref 0–150)
VLDLC SERPL-MCNC: 21 MG/DL (ref 5–40)
WBC NRBC COR # BLD: 6.93 10*3/MM3 (ref 3.4–10.8)

## 2022-03-16 PROCEDURE — 1170F FXNL STATUS ASSESSED: CPT | Performed by: INTERNAL MEDICINE

## 2022-03-16 PROCEDURE — 1159F MED LIST DOCD IN RCRD: CPT | Performed by: INTERNAL MEDICINE

## 2022-03-16 PROCEDURE — 80053 COMPREHEN METABOLIC PANEL: CPT | Performed by: INTERNAL MEDICINE

## 2022-03-16 PROCEDURE — 99214 OFFICE O/P EST MOD 30 MIN: CPT | Performed by: INTERNAL MEDICINE

## 2022-03-16 PROCEDURE — 1126F AMNT PAIN NOTED NONE PRSNT: CPT | Performed by: INTERNAL MEDICINE

## 2022-03-16 PROCEDURE — 85027 COMPLETE CBC AUTOMATED: CPT | Performed by: INTERNAL MEDICINE

## 2022-03-16 PROCEDURE — 82306 VITAMIN D 25 HYDROXY: CPT | Performed by: INTERNAL MEDICINE

## 2022-03-16 PROCEDURE — G0439 PPPS, SUBSEQ VISIT: HCPCS | Performed by: INTERNAL MEDICINE

## 2022-03-16 PROCEDURE — 36415 COLL VENOUS BLD VENIPUNCTURE: CPT | Performed by: INTERNAL MEDICINE

## 2022-03-16 PROCEDURE — G0103 PSA SCREENING: HCPCS | Performed by: INTERNAL MEDICINE

## 2022-03-16 PROCEDURE — 80061 LIPID PANEL: CPT | Performed by: INTERNAL MEDICINE

## 2022-03-16 NOTE — PATIENT INSTRUCTIONS
Medicare Wellness  Personal Prevention Plan of Service     Date of Office Visit:    Encounter Provider:  Smith Cabello MD  Place of Service:  [facility]  Patient Name: Jose Luis Davis  :  1950    As part of the Medicare Wellness portion of your visit today, we are providing you with this personalized preventive plan of services (PPPS). This plan is based upon recommendations of the [place] Preventive Services Task Force (USPSTF) and the Advisory Committee on Immunization Practices (ACIP).    This lists the preventive care services that should be considered, and provides dates of when you are due. Items listed as completed are up-to-date and do not require any further intervention.    Health Maintenance   Topic Date Due    HEPATITIS C SCREENING  Never done    ZOSTER VACCINE (2 of 2) 2017    Pneumococcal Vaccine 65+ (2 of 2 - PPSV23) 10/13/2022    ANNUAL WELLNESS VISIT  2023    TDAP/TD VACCINES (2 - Td or Tdap) 2027    COLORECTAL CANCER SCREENING  2027    COVID-19 Vaccine  Completed    INFLUENZA VACCINE  Completed    AAA SCREEN (ONE-TIME)  Completed       Orders Placed This Encounter   Procedures    CBC (No Diff)     Standing Status:   Future     Standing Expiration Date:   3/16/2023     Order Specific Question:   Release to patient     Answer:   Immediate    Comprehensive Metabolic Panel     Order Specific Question:   Release to patient     Answer:   Immediate    Lipid Panel    Vitamin D 25 Hydroxy     Order Specific Question:   Release to patient     Answer:   Immediate    PSA Screen     Standing Status:   Future     Standing Expiration Date:   3/16/2023     Order Specific Question:   Release to patient     Answer:   Immediate       No follow-ups on file.

## 2022-03-16 NOTE — PROGRESS NOTES
QUICK REFERENCE INFORMATION:  The ABCs of the Annual Wellness Visit    Subsequent Medicare Wellness Visit patient seen for Medicare wellness exam.  Patient seen for dyslipidemia.  Patient uses diet exercise and Crestor 40 mg daily.  Triglycerides 102, HDL 40, LDL 51.  These labs were obtained 1 year ago.  Patient is having labs today and will follow up.  Patient has vitamin D3 deficiency supplement with over-the-counter D3.  Vitamin D level was 48 six 1 year ago.  Patient is having levels rechecked today.  Patient does have supplemental magnesium levels are being checked today.  Patient will continue present diet and activity levels.  Patient will follow up with labs.    Dictated utilizing Dragon dictation. If there are questions or for further clarification, please contact me.    HEALTH RISK ASSESSMENT    1950    Recent Hospitalizations:  No hospitalization(s) within the last year..        Current Medical Providers:  Patient Care Team:  Smith Cabello MD as PCP - General (Internal Medicine)  Nathan Freeman Jr., MD as Consulting Physician (Urology)        Smoking Status:  Social History     Tobacco Use   Smoking Status Former Smoker   Smokeless Tobacco Not on file   Tobacco Comment    quit about 30 yrs ago       Alcohol Consumption:  Social History     Substance and Sexual Activity   Alcohol Use Yes   • Alcohol/week: 6.0 standard drinks   • Types: 6 Cans of beer per week       Depression Screen:   PHQ-2/PHQ-9 Depression Screening 3/16/2022   Retired Total Score -   Little Interest or Pleasure in Doing Things 0-->not at all   Feeling Down, Depressed or Hopeless 0-->not at all   Trouble Falling or Staying Asleep, or Sleeping Too Much 0-->not at all   Feeling Tired or Having Little Energy 0-->not at all   Poor Appetite or Overeating 0-->not at all   Feeling Bad about Yourself - or that You are a Failure or Have Let Yourself or Your Family Down 0-->not at all   Trouble Concentrating on Things, Such as Reading  the Newspaper or Watching Television 0-->not at all   Moving or Speaking So Slowly that Other People Could Have Noticed? Or the Opposite - Being So Fidgety 0-->not at all   Thoughts that You Would be Better Off Dead or of Hurting Yourself in Some Way 0-->not at all   PHQ-9: Brief Depression Severity Measure Score 0   If You Checked Off Any Problems, How Difficult Have These Problems Made It For You to Do Your Work, Take Care of Things at Home, or Get Along with Other People? not difficult at all       Health Habits and Functional and Cognitive Screening:  Functional & Cognitive Status 3/16/2022   Do you have difficulty preparing food and eating? No   Do you have difficulty bathing yourself, getting dressed or grooming yourself? No   Do you have difficulty using the toilet? No   Do you have difficulty moving around from place to place? No   Do you have trouble with steps or getting out of a bed or a chair? No   Current Diet Well Balanced Diet   Dental Exam Up to date   Eye Exam Up to date   Exercise (times per week) 4 times per week   Current Exercises Include Aerobics   Current Exercise Activities Include -   Do you need help using the phone?  No   Are you deaf or do you have serious difficulty hearing?  No   Do you need help with transportation? No   Do you need help shopping? No   Do you need help preparing meals?  No   Do you need help with housework?  No   Do you need help with laundry? No   Do you need help taking your medications? No   Do you need help managing money? No   Do you ever drive or ride in a car without wearing a seat belt? No   Have you felt unusual stress, anger or loneliness in the last month? No   Who do you live with? Alone   If you need help, do you have trouble finding someone available to you? No   Have you been bothered in the last four weeks by sexual problems? No   Do you have difficulty concentrating, remembering or making decisions? No           Does the patient have evidence of  cognitive impairment? No    Aspirin use counseling: Does not need ASA (and currently is not on it)      Recent Lab Results:  CMP:  Lab Results   Component Value Date    BUN 15 03/18/2021    CREATININE 1.06 03/18/2021    EGFRIFNONA 69 03/18/2021    BCR 14.2 03/18/2021     03/18/2021    K 4.9 03/18/2021    CO2 29.0 03/18/2021    CALCIUM 9.4 03/18/2021    ALBUMIN 4.60 03/18/2021    BILITOT 1.1 03/18/2021    ALKPHOS 69 03/18/2021    AST 26 03/18/2021    ALT 31 03/18/2021     Lipid Panel:  Lab Results   Component Value Date    CHOL 110 03/18/2021    TRIG 102 03/18/2021    HDL 40 03/18/2021    VLDL 19 03/18/2021    LDLHDL 1.24 03/18/2021     HbA1c:       Visual Acuity:  No exam data present    Age-appropriate Screening Schedule:  Refer to the list below for future screening recommendations based on patient's age, sex and/or medical conditions. Orders for these recommended tests are listed in the plan section. The patient has been provided with a written plan.    Health Maintenance   Topic Date Due   • ZOSTER VACCINE (2 of 2) 06/23/2017   • TDAP/TD VACCINES (2 - Td or Tdap) 04/28/2027   • INFLUENZA VACCINE  Completed        Subjective   History of Present Illness    Jose Luis Davis is a 71 y.o. male who presents for an Subsequent Wellness Visit.    The following portions of the patient's history were reviewed and updated as appropriate: allergies, current medications, past family history, past medical history, past social history, past surgical history and problem list.    Outpatient Medications Prior to Visit   Medication Sig Dispense Refill   • Coenzyme Q10 (CO Q 10 PO) Take  by mouth 2 (Two) Times a Day.     • magnesium gluconate (MAGONATE) 500 MG tablet Take 27 mg by mouth Daily.     • Multiple Vitamins-Minerals (MULTIVITAMIN ADULT PO) Take 1 tablet/day by mouth.     • rosuvastatin (CRESTOR) 40 MG tablet TAKE ONE TABLET BY MOUTH DAILY 90 tablet 1   • Saw Palmetto 450 MG capsule Take  by mouth 2 (Two) Times a  Day.     • indomethacin (INDOCIN) 50 MG capsule Take 50 mg by mouth 2 (Two) Times a Day As Needed (headache). bid       No facility-administered medications prior to visit.       Patient Active Problem List   Diagnosis   • Ureterolithiasis   • Benign nodular prostatic hyperplasia   • Essential hypertension   • Dyslipidemia   • Osteoarthritis of multiple joints   • Tension type headache   • Migraine without aura       Advance Care Planning:  ACP discussion was held with the patient during this visit. Patient does not have an advance directive, information provided.    Identification of Risk Factors:  Risk factors include: Advance Directive Discussion.    Review of Systems   Constitutional: Negative for fatigue and fever.   HENT: Positive for congestion. Negative for trouble swallowing.    Eyes: Negative for discharge and visual disturbance.   Respiratory: Negative for choking and shortness of breath.    Cardiovascular: Negative for chest pain and palpitations.   Gastrointestinal: Negative for abdominal pain and blood in stool.   Endocrine: Negative.    Genitourinary: Negative for genital sores and hematuria.   Musculoskeletal: Negative for gait problem and joint swelling.   Skin: Negative for color change, pallor, rash and wound.   Allergic/Immunologic: Positive for environmental allergies. Negative for immunocompromised state.   Neurological: Negative for facial asymmetry and speech difficulty.   Psychiatric/Behavioral: Negative for hallucinations and suicidal ideas.       Compared to one year ago, the patient feels his physical health is the same.  Compared to one year ago, the patient feels his mental health is the same.    Objective     Physical Exam  Vitals and nursing note reviewed.   Constitutional:       Appearance: Normal appearance. He is well-developed.   HENT:      Head: Normocephalic and atraumatic.      Nose: Nose normal.      Mouth/Throat:      Mouth: Mucous membranes are moist.      Pharynx:  "Oropharynx is clear.   Eyes:      Extraocular Movements: Extraocular movements intact.      Conjunctiva/sclera: Conjunctivae normal.      Pupils: Pupils are equal, round, and reactive to light.   Cardiovascular:      Rate and Rhythm: Normal rate and regular rhythm.      Heart sounds: Normal heart sounds. No murmur heard.    No friction rub. No gallop.   Pulmonary:      Effort: Pulmonary effort is normal. No respiratory distress.      Breath sounds: Normal breath sounds. No stridor. No wheezing, rhonchi or rales.   Chest:      Chest wall: No tenderness.   Abdominal:      General: Bowel sounds are normal.      Palpations: Abdomen is soft.   Musculoskeletal:         General: Normal range of motion.      Cervical back: Normal range of motion and neck supple.   Skin:     General: Skin is warm and dry.   Neurological:      General: No focal deficit present.      Mental Status: He is alert and oriented to person, place, and time. Mental status is at baseline.   Psychiatric:         Mood and Affect: Mood normal.         Behavior: Behavior normal.         Thought Content: Thought content normal.         Judgment: Judgment normal.         Vitals:    03/16/22 0828   BP: 142/86   BP Location: Left arm   Patient Position: Sitting   Cuff Size: Adult   Pulse: 71   Temp: 99.3 °F (37.4 °C)   TempSrc: Infrared   SpO2: 97%   Weight: 98.2 kg (216 lb 6.4 oz)   Height: 182.9 cm (72\")   PainSc: 0-No pain       Patient's Body mass index is 29.35 kg/m². indicating that he is within normal range (BMI 18.5-24.9). No BMI management plan needed..      Assessment/Plan #1 labs #2 monitor blood pressure #3 follow-up  Patient Self-Management and Personalized Health Advice  The patient has been provided with information about: NA and preventive services including:   · NA.    Visit Diagnoses:    ICD-10-CM ICD-9-CM   1. Medicare annual wellness visit, subsequent  Z00.00 V70.0   2. Dyslipidemia  E78.5 272.4   3. Vitamin D deficiency  E55.9 268.9   4. " Hypomagnesemia  E83.42 275.2   5. Prostate cancer screening  Z12.5 V76.44       Orders Placed This Encounter   Procedures   • CBC (No Diff)     Standing Status:   Future     Standing Expiration Date:   3/16/2023     Order Specific Question:   Release to patient     Answer:   Immediate   • Comprehensive Metabolic Panel     Order Specific Question:   Release to patient     Answer:   Immediate   • Lipid Panel   • Vitamin D 25 Hydroxy     Order Specific Question:   Release to patient     Answer:   Immediate   • PSA Screen     Standing Status:   Future     Standing Expiration Date:   3/16/2023     Order Specific Question:   Release to patient     Answer:   Immediate       Outpatient Encounter Medications as of 3/16/2022   Medication Sig Dispense Refill   • Coenzyme Q10 (CO Q 10 PO) Take  by mouth 2 (Two) Times a Day.     • magnesium gluconate (MAGONATE) 500 MG tablet Take 27 mg by mouth Daily.     • Multiple Vitamins-Minerals (MULTIVITAMIN ADULT PO) Take 1 tablet/day by mouth.     • rosuvastatin (CRESTOR) 40 MG tablet TAKE ONE TABLET BY MOUTH DAILY 90 tablet 1   • Saw Palmetto 450 MG capsule Take  by mouth 2 (Two) Times a Day.     • [DISCONTINUED] indomethacin (INDOCIN) 50 MG capsule Take 50 mg by mouth 2 (Two) Times a Day As Needed (headache). bid       No facility-administered encounter medications on file as of 3/16/2022.       Reviewed use of high risk medication in the elderly: not applicable  Reviewed for potential of harmful drug interactions in the elderly: not applicable    Follow Up:  Return in about 6 months (around 9/16/2022), or if symptoms worsen or fail to improve, for Recheck.     An After Visit Summary and PPPS with all of these plans were given to the patient.

## 2022-05-18 RX ORDER — ROSUVASTATIN CALCIUM 40 MG/1
TABLET, COATED ORAL
Qty: 90 TABLET | Refills: 1 | Status: SHIPPED | OUTPATIENT
Start: 2022-05-18 | End: 2022-11-16

## 2022-09-19 DIAGNOSIS — I10 ESSENTIAL HYPERTENSION: ICD-10-CM

## 2022-09-19 DIAGNOSIS — E55.9 VITAMIN D DEFICIENCY, UNSPECIFIED: ICD-10-CM

## 2022-09-19 DIAGNOSIS — E55.9 VITAMIN D DEFICIENCY: Primary | ICD-10-CM

## 2022-09-19 DIAGNOSIS — E83.42 HYPOMAGNESEMIA: ICD-10-CM

## 2022-09-19 DIAGNOSIS — E78.5 DYSLIPIDEMIA: ICD-10-CM

## 2022-09-20 ENCOUNTER — LAB (OUTPATIENT)
Dept: FAMILY MEDICINE CLINIC | Facility: CLINIC | Age: 72
End: 2022-09-20

## 2022-09-20 DIAGNOSIS — E55.9 VITAMIN D DEFICIENCY, UNSPECIFIED: ICD-10-CM

## 2022-09-20 DIAGNOSIS — E83.42 HYPOMAGNESEMIA: ICD-10-CM

## 2022-09-20 DIAGNOSIS — E78.5 DYSLIPIDEMIA: ICD-10-CM

## 2022-09-20 DIAGNOSIS — I10 ESSENTIAL HYPERTENSION: ICD-10-CM

## 2022-09-20 LAB
25(OH)D3 SERPL-MCNC: 44 NG/ML (ref 30–100)
ALBUMIN SERPL-MCNC: 4.8 G/DL (ref 3.5–5.2)
ALBUMIN/GLOB SERPL: 1.7 G/DL
ALP SERPL-CCNC: 90 U/L (ref 39–117)
ALT SERPL W P-5'-P-CCNC: 20 U/L (ref 1–41)
ANION GAP SERPL CALCULATED.3IONS-SCNC: 10.5 MMOL/L (ref 5–15)
AST SERPL-CCNC: 24 U/L (ref 1–40)
BILIRUB SERPL-MCNC: 1.1 MG/DL (ref 0–1.2)
BUN SERPL-MCNC: 16 MG/DL (ref 8–23)
BUN/CREAT SERPL: 15.8 (ref 7–25)
CALCIUM SPEC-SCNC: 10.2 MG/DL (ref 8.6–10.5)
CHLORIDE SERPL-SCNC: 105 MMOL/L (ref 98–107)
CHOLEST SERPL-MCNC: 98 MG/DL (ref 0–200)
CO2 SERPL-SCNC: 27.5 MMOL/L (ref 22–29)
CREAT SERPL-MCNC: 1.01 MG/DL (ref 0.76–1.27)
DEPRECATED RDW RBC AUTO: 39.7 FL (ref 37–54)
EGFRCR SERPLBLD CKD-EPI 2021: 79.5 ML/MIN/1.73
ERYTHROCYTE [DISTWIDTH] IN BLOOD BY AUTOMATED COUNT: 11.9 % (ref 12.3–15.4)
GLOBULIN UR ELPH-MCNC: 2.9 GM/DL
GLUCOSE SERPL-MCNC: 94 MG/DL (ref 65–99)
HCT VFR BLD AUTO: 40.6 % (ref 37.5–51)
HDLC SERPL-MCNC: 41 MG/DL (ref 40–60)
HGB BLD-MCNC: 14.2 G/DL (ref 13–17.7)
LDLC SERPL CALC-MCNC: 43 MG/DL (ref 0–100)
LDLC/HDLC SERPL: 1.08 {RATIO}
MAGNESIUM SERPL-MCNC: 2.4 MG/DL (ref 1.6–2.4)
MCH RBC QN AUTO: 31.8 PG (ref 26.6–33)
MCHC RBC AUTO-ENTMCNC: 35 G/DL (ref 31.5–35.7)
MCV RBC AUTO: 91 FL (ref 79–97)
PLATELET # BLD AUTO: 257 10*3/MM3 (ref 140–450)
PMV BLD AUTO: 10.5 FL (ref 6–12)
POTASSIUM SERPL-SCNC: 4.5 MMOL/L (ref 3.5–5.2)
PROT SERPL-MCNC: 7.7 G/DL (ref 6–8.5)
RBC # BLD AUTO: 4.46 10*6/MM3 (ref 4.14–5.8)
SODIUM SERPL-SCNC: 143 MMOL/L (ref 136–145)
TRIGL SERPL-MCNC: 64 MG/DL (ref 0–150)
VLDLC SERPL-MCNC: 14 MG/DL (ref 5–40)
WBC NRBC COR # BLD: 5.85 10*3/MM3 (ref 3.4–10.8)

## 2022-09-20 PROCEDURE — 80053 COMPREHEN METABOLIC PANEL: CPT | Performed by: INTERNAL MEDICINE

## 2022-09-20 PROCEDURE — 85027 COMPLETE CBC AUTOMATED: CPT | Performed by: INTERNAL MEDICINE

## 2022-09-20 PROCEDURE — 82306 VITAMIN D 25 HYDROXY: CPT | Performed by: INTERNAL MEDICINE

## 2022-09-20 PROCEDURE — 83735 ASSAY OF MAGNESIUM: CPT | Performed by: INTERNAL MEDICINE

## 2022-09-20 PROCEDURE — 80061 LIPID PANEL: CPT | Performed by: INTERNAL MEDICINE

## 2022-09-20 PROCEDURE — 36415 COLL VENOUS BLD VENIPUNCTURE: CPT | Performed by: INTERNAL MEDICINE

## 2022-09-28 ENCOUNTER — OFFICE VISIT (OUTPATIENT)
Dept: FAMILY MEDICINE CLINIC | Facility: CLINIC | Age: 72
End: 2022-09-28

## 2022-09-28 VITALS
DIASTOLIC BLOOD PRESSURE: 70 MMHG | WEIGHT: 205 LBS | SYSTOLIC BLOOD PRESSURE: 128 MMHG | BODY MASS INDEX: 27.77 KG/M2 | OXYGEN SATURATION: 99 % | TEMPERATURE: 98 F | HEART RATE: 60 BPM | HEIGHT: 72 IN

## 2022-09-28 DIAGNOSIS — M15.9 PRIMARY OSTEOARTHRITIS INVOLVING MULTIPLE JOINTS: ICD-10-CM

## 2022-09-28 DIAGNOSIS — E78.5 DYSLIPIDEMIA: ICD-10-CM

## 2022-09-28 DIAGNOSIS — I10 ESSENTIAL HYPERTENSION: Primary | ICD-10-CM

## 2022-09-28 PROCEDURE — 99214 OFFICE O/P EST MOD 30 MIN: CPT | Performed by: INTERNAL MEDICINE

## 2022-09-28 PROCEDURE — 0124A COVID-19 (PFIZER) BIVALENT BOOSTER 12+YRS: CPT | Performed by: INTERNAL MEDICINE

## 2022-09-28 PROCEDURE — 91312 COVID-19 (PFIZER) BIVALENT BOOSTER 12+YRS: CPT | Performed by: INTERNAL MEDICINE

## 2022-09-28 RX ORDER — MAGNESIUM GLUCONATE 27 MG(500)
27 TABLET ORAL 2 TIMES DAILY
Qty: 90 TABLET | Refills: 1 | COMMUNITY
Start: 2022-09-28

## 2022-09-28 NOTE — PROGRESS NOTES
"Chief Complaint  go over labs 6 month f/u, Hypertension, and Hyperlipidemia    Subjective        Jose Luis Davis presents to [facility]  History of Present Illness patient was seen for hypertension.  Blood pressures been running 120s over 70s at home.  Patient will continue monitoring blood pressure.  Patient's lipids treated with diet exercise and rosuvastatin 40 mg daily.  Triglycerides 64, HDL 41, LDL 43.  Patient will continue present diet and exercise activities.  Patient's osteoarthritis been treated with over-the-counter medications.  Patient's is also active and is doing a lot of walking.  Patient will continue present treatment.      Dictated utilizing Dragon dictation. If there are questions or for further clarification, please contact me.    Objective   Vital Signs:  Blood Pressure 128/70   Pulse 60   Temperature 98 °F (36.7 °C)   Height 182.9 cm (72\")   Weight 93 kg (205 lb)   Oxygen Saturation 99%   Body Mass Index 27.80 kg/m²   Estimated body mass index is 27.8 kg/m² as calculated from the following:    Height as of this encounter: 182.9 cm (72\").    Weight as of this encounter: 93 kg (205 lb).          Physical Exam  Vitals and nursing note reviewed.   Constitutional:       Appearance: Normal appearance. He is well-developed.   HENT:      Head: Normocephalic and atraumatic.      Nose: Nose normal.      Mouth/Throat:      Mouth: Mucous membranes are moist.      Pharynx: Oropharynx is clear.   Eyes:      Extraocular Movements: Extraocular movements intact.      Conjunctiva/sclera: Conjunctivae normal.      Pupils: Pupils are equal, round, and reactive to light.   Cardiovascular:      Rate and Rhythm: Normal rate and regular rhythm.      Heart sounds: Normal heart sounds. No murmur heard.    No friction rub. No gallop.   Pulmonary:      Effort: Pulmonary effort is normal. No respiratory distress.      Breath sounds: Normal breath sounds. No stridor. No wheezing, " rhonchi or rales.   Chest:      Chest wall: No tenderness.   Abdominal:      General: Bowel sounds are normal.      Palpations: Abdomen is soft.   Musculoskeletal:         General: Normal range of motion.      Cervical back: Normal range of motion and neck supple.   Skin:     General: Skin is warm and dry.   Neurological:      General: No focal deficit present.      Mental Status: He is alert and oriented to person, place, and time. Mental status is at baseline.   Psychiatric:         Mood and Affect: Mood normal.         Behavior: Behavior normal.         Thought Content: Thought content normal.         Judgment: Judgment normal.        Result Review :                Assessment and Plan  #1 continue present diet and activity levels #2 continue monitoring blood pressure  Diagnoses and all orders for this visit:    1. Essential hypertension (Primary)    2. Dyslipidemia    3. Primary osteoarthritis involving multiple joints    Other orders  -     magnesium gluconate (MAGONATE) 500 MG tablet; Take 1 tablet by mouth 2 (Two) Times a Day.  Dispense: 90 tablet; Refill: 1  -     COVID-19 Bivalent Booster (Pfizer) 12+yrs             Follow Up   Return in about 6 months (around 3/28/2023), or if symptoms worsen or fail to improve, for Recheck.  Patient was given instructions and counseling regarding his condition or for health maintenance advice. Please see specific information pulled into the AVS if appropriate.

## 2022-11-16 RX ORDER — ROSUVASTATIN CALCIUM 40 MG/1
TABLET, COATED ORAL
Qty: 90 TABLET | Refills: 1 | Status: SHIPPED | OUTPATIENT
Start: 2022-11-16

## 2023-03-21 DIAGNOSIS — Z12.5 PROSTATE CANCER SCREENING: ICD-10-CM

## 2023-03-21 DIAGNOSIS — E83.42 HYPOMAGNESEMIA: ICD-10-CM

## 2023-03-21 DIAGNOSIS — I10 ESSENTIAL HYPERTENSION: Primary | ICD-10-CM

## 2023-03-21 DIAGNOSIS — E78.5 DYSLIPIDEMIA: ICD-10-CM

## 2023-03-28 DIAGNOSIS — E78.5 DYSLIPIDEMIA: ICD-10-CM

## 2023-03-28 DIAGNOSIS — I10 ESSENTIAL HYPERTENSION: ICD-10-CM

## 2023-03-28 DIAGNOSIS — Z12.5 PROSTATE CANCER SCREENING: ICD-10-CM

## 2023-03-28 DIAGNOSIS — E83.42 HYPOMAGNESEMIA: ICD-10-CM

## 2023-03-28 LAB
ALBUMIN SERPL-MCNC: 4.5 G/DL (ref 3.5–5.2)
ALBUMIN/GLOB SERPL: 1.5 G/DL
ALP SERPL-CCNC: 90 U/L (ref 39–117)
ALT SERPL-CCNC: 30 U/L (ref 1–41)
AST SERPL-CCNC: 30 U/L (ref 1–40)
BILIRUB SERPL-MCNC: 1.5 MG/DL (ref 0–1.2)
BUN SERPL-MCNC: 17 MG/DL (ref 8–23)
BUN/CREAT SERPL: 17.2 (ref 7–25)
CALCIUM SERPL-MCNC: 10.2 MG/DL (ref 8.6–10.5)
CHLORIDE SERPL-SCNC: 101 MMOL/L (ref 98–107)
CHOLEST SERPL-MCNC: 124 MG/DL (ref 0–200)
CO2 SERPL-SCNC: 28 MMOL/L (ref 22–29)
CREAT SERPL-MCNC: 0.99 MG/DL (ref 0.76–1.27)
EGFRCR SERPLBLD CKD-EPI 2021: 81.4 ML/MIN/1.73
ERYTHROCYTE [DISTWIDTH] IN BLOOD BY AUTOMATED COUNT: 12.1 % (ref 12.3–15.4)
GLOBULIN SER CALC-MCNC: 3 GM/DL
GLUCOSE SERPL-MCNC: 99 MG/DL (ref 65–99)
HCT VFR BLD AUTO: 41.9 % (ref 37.5–51)
HDLC SERPL-MCNC: 40 MG/DL (ref 40–60)
HGB BLD-MCNC: 14.5 G/DL (ref 13–17.7)
LDLC SERPL CALC-MCNC: 61 MG/DL (ref 0–100)
MAGNESIUM SERPL-MCNC: 2.2 MG/DL (ref 1.6–2.4)
MCH RBC QN AUTO: 32.5 PG (ref 26.6–33)
MCHC RBC AUTO-ENTMCNC: 34.6 G/DL (ref 31.5–35.7)
MCV RBC AUTO: 93.9 FL (ref 79–97)
PLATELET # BLD AUTO: 256 10*3/MM3 (ref 140–450)
POTASSIUM SERPL-SCNC: 4.1 MMOL/L (ref 3.5–5.2)
PROT SERPL-MCNC: 7.5 G/DL (ref 6–8.5)
PSA SERPL-MCNC: 4.69 NG/ML (ref 0–4)
RBC # BLD AUTO: 4.46 10*6/MM3 (ref 4.14–5.8)
SODIUM SERPL-SCNC: 140 MMOL/L (ref 136–145)
TRIGL SERPL-MCNC: 128 MG/DL (ref 0–150)
VLDLC SERPL CALC-MCNC: 23 MG/DL (ref 5–40)
WBC # BLD AUTO: 6.48 10*3/MM3 (ref 3.4–10.8)

## 2023-04-04 ENCOUNTER — OFFICE VISIT (OUTPATIENT)
Dept: FAMILY MEDICINE CLINIC | Facility: CLINIC | Age: 73
End: 2023-04-04
Payer: MEDICARE

## 2023-04-04 VITALS
HEART RATE: 76 BPM | DIASTOLIC BLOOD PRESSURE: 76 MMHG | BODY MASS INDEX: 28.53 KG/M2 | OXYGEN SATURATION: 98 % | WEIGHT: 210.6 LBS | TEMPERATURE: 97.6 F | HEIGHT: 72 IN | SYSTOLIC BLOOD PRESSURE: 132 MMHG

## 2023-04-04 DIAGNOSIS — I10 ESSENTIAL HYPERTENSION: Primary | ICD-10-CM

## 2023-04-04 DIAGNOSIS — E78.5 DYSLIPIDEMIA: ICD-10-CM

## 2023-04-04 DIAGNOSIS — Z00.00 MEDICARE ANNUAL WELLNESS VISIT, SUBSEQUENT: ICD-10-CM

## 2023-04-04 DIAGNOSIS — M15.9 PRIMARY OSTEOARTHRITIS INVOLVING MULTIPLE JOINTS: ICD-10-CM

## 2023-04-04 PROCEDURE — G0439 PPPS, SUBSEQ VISIT: HCPCS | Performed by: INTERNAL MEDICINE

## 2023-04-04 PROCEDURE — 1159F MED LIST DOCD IN RCRD: CPT | Performed by: INTERNAL MEDICINE

## 2023-04-04 PROCEDURE — 1160F RVW MEDS BY RX/DR IN RCRD: CPT | Performed by: INTERNAL MEDICINE

## 2023-04-04 PROCEDURE — 3075F SYST BP GE 130 - 139MM HG: CPT | Performed by: INTERNAL MEDICINE

## 2023-04-04 PROCEDURE — 3078F DIAST BP <80 MM HG: CPT | Performed by: INTERNAL MEDICINE

## 2023-04-04 PROCEDURE — 1170F FXNL STATUS ASSESSED: CPT | Performed by: INTERNAL MEDICINE

## 2023-04-04 NOTE — PROGRESS NOTES
The ABCs of the Annual Wellness Visit  Subsequent Medicare Wellness Visit    Subjective    Jose Luis Davis is a 72 y.o. male who presents for a Subsequent Medicare Wellness Visit.  Patient was seen for Medicare wellness exam.  Patient's bilirubin was slightly elevated 1.5.  Patient was told he could have Guilbert's disease    The following portions of the patient's history were reviewed and   updated as appropriate: past family history, past medical history, past social history, past surgical history and problem list.    Compared to one year ago, the patient feels his physical   health is the same.    Compared to one year ago, the patient feels his mental   health is the same.    Recent Hospitalizations:  He was not admitted to the hospital during the last year.       Current Medical Providers:  Patient Care Team:  Smith Cabello MD as PCP - General (Internal Medicine)  Nathan Freeman Jr., MD as Consulting Physician (Urology)    Outpatient Medications Prior to Visit   Medication Sig Dispense Refill   • Coenzyme Q10 (CO Q 10 PO) Take  by mouth 2 (Two) Times a Day.     • magnesium gluconate (MAGONATE) 500 MG tablet Take 1 tablet by mouth 2 (Two) Times a Day. 90 tablet 1   • Multiple Vitamins-Minerals (MULTIVITAMIN ADULT PO) Take 1 tablet/day by mouth.     • rosuvastatin (CRESTOR) 40 MG tablet TAKE ONE TABLET BY MOUTH DAILY 90 tablet 1   • Saw Palmetto 450 MG capsule Take  by mouth 2 (Two) Times a Day.       No facility-administered medications prior to visit.       No opioid medication identified on active medication list. I have reviewed chart for other potential  high risk medication/s and harmful drug interactions in the elderly.          Aspirin is not on active medication list.  Aspirin use is not indicated based on review of current medical condition/s. Risk of harm outweighs potential benefits.  .    Patient Active Problem List   Diagnosis   • Ureterolithiasis   • Benign nodular prostatic hyperplasia   •  "Essential hypertension   • Dyslipidemia   • Osteoarthritis of multiple joints   • Tension type headache   • Migraine without aura     Advance Care Planning   Advance Care Planning     Advance Directive is not on file.  ACP discussion was held with the patient during this visit. Patient has an advance directive (not in EMR), copy requested.     Objective    Vitals:    04/04/23 0859   BP: 132/76   Pulse: 76   Temp: 97.6 °F (36.4 °C)   SpO2: 98%   Weight: 95.5 kg (210 lb 9.6 oz)   Height: 182.9 cm (72\")     Estimated body mass index is 28.56 kg/m² as calculated from the following:    Height as of this encounter: 182.9 cm (72\").    Weight as of this encounter: 95.5 kg (210 lb 9.6 oz).    BMI is >= 25 and <30. (Overweight) The following options were offered after discussion;: NA      Does the patient have evidence of cognitive impairment? No    Lab Results   Component Value Date    CHLPL 124 03/28/2023    TRIG 128 03/28/2023    HDL 40 03/28/2023    LDL 61 03/28/2023    VLDL 23 03/28/2023        HEALTH RISK ASSESSMENT    Smoking Status:  Social History     Tobacco Use   Smoking Status Former   Smokeless Tobacco Not on file   Tobacco Comments    quit about 30 yrs ago     Alcohol Consumption:  Social History     Substance and Sexual Activity   Alcohol Use Yes   • Alcohol/week: 6.0 standard drinks   • Types: 6 Cans of beer per week     Fall Risk Screen:    OLEG Fall Risk Assessment was completed, and patient is at LOW risk for falls.Assessment completed on:4/4/2023    Depression Screening:  PHQ-2/PHQ-9 Depression Screening 4/4/2023   Little Interest or Pleasure in Doing Things 0-->not at all   Feeling Down, Depressed or Hopeless 0-->not at all   Trouble Falling or Staying Asleep, or Sleeping Too Much -   Feeling Tired or Having Little Energy -   Poor Appetite or Overeating -   Feeling Bad about Yourself - or that You are a Failure or Have Let Yourself or Your Family Down -   Trouble Concentrating on Things, Such as Reading " the Newspaper or Watching Television -   Moving or Speaking So Slowly that Other People Could Have Noticed? Or the Opposite - Being So Fidgety -   Thoughts that You Would be Better Off Dead or of Hurting Yourself in Some Way -   PHQ-9: Brief Depression Severity Measure Score 0   If You Checked Off Any Problems, How Difficult Have These Problems Made It For You to Do Your Work, Take Care of Things at Home, or Get Along with Other People? -       Health Habits and Functional and Cognitive Screening:  Functional & Cognitive Status 4/4/2023   Do you have difficulty preparing food and eating? No   Do you have difficulty bathing yourself, getting dressed or grooming yourself? No   Do you have difficulty using the toilet? No   Do you have difficulty moving around from place to place? No   Do you have trouble with steps or getting out of a bed or a chair? No   Current Diet Well Balanced Diet   Dental Exam Up to date   Eye Exam Up to date   Exercise (times per week) 3 times per week   Current Exercises Include Cardiovascular Workout   Current Exercise Activities Include -   Do you need help using the phone?  No   Are you deaf or do you have serious difficulty hearing?  No   Do you need help with transportation? No   Do you need help shopping? No   Do you need help preparing meals?  No   Do you need help with housework?  No   Do you need help with laundry? No   Do you need help taking your medications? No   Do you need help managing money? No   Do you ever drive or ride in a car without wearing a seat belt? No   Have you felt unusual stress, anger or loneliness in the last month? No   Who do you live with? Alone   If you need help, do you have trouble finding someone available to you? No   Have you been bothered in the last four weeks by sexual problems? No   Do you have difficulty concentrating, remembering or making decisions? No       Age-appropriate Screening Schedule:  Refer to the list below for future screening  recommendations based on patient's age, sex and/or medical conditions. Orders for these recommended tests are listed in the plan section. The patient has been provided with a written plan.    Health Maintenance   Topic Date Due   • HEPATITIS C SCREENING  Never done   • Pneumococcal Vaccine 65+ (2 - PPSV23 if available, else PCV20) 10/13/2022   • ZOSTER VACCINE (2 of 2) 09/28/2023 (Originally 6/23/2017)   • INFLUENZA VACCINE  08/01/2023   • ANNUAL WELLNESS VISIT  04/04/2024   • TDAP/TD VACCINES (2 - Td or Tdap) 04/28/2027   • COLORECTAL CANCER SCREENING  04/28/2027   • COVID-19 Vaccine  Completed   • AAA SCREEN (ONE-TIME)  Completed                  CMS Preventative Services Quick Reference  Risk Factors Identified During Encounter  NA  The above risks/problems have been discussed with the patient.  Pertinent information has been shared with the patient in the After Visit Summary.  An After Visit Summary and PPPS were made available to the patient.    Follow Up:   Next Medicare Wellness visit to be scheduled in 1 year.       Additional E&M Note during same encounter follows:  Patient has multiple medical problems which are significant and separately identifiable that require additional work above and beyond the Medicare Wellness Visit.      Chief Complaint  Medicare Wellness-subsequent and Hypertension    Subjective        HPI  Jose Luis Davis is also being seen today for wellness exam    Review of Systems   HENT: Positive for congestion. Negative for trouble swallowing.    Respiratory: Negative for chest tightness and shortness of breath.    Cardiovascular: Negative for chest pain and leg swelling.   Gastrointestinal: Negative for abdominal distention and rectal pain.   Endocrine: Negative for cold intolerance and heat intolerance.   Genitourinary: Positive for difficulty urinating.   Musculoskeletal: Negative for arthralgias and neck pain.   Skin: Negative for color change, pallor, rash and wound.  "  Allergic/Immunologic: Positive for environmental allergies.   Neurological: Negative for dizziness and speech difficulty.   Hematological: Negative for adenopathy. Does not bruise/bleed easily.   Psychiatric/Behavioral: Negative for suicidal ideas. The patient is not nervous/anxious.        Objective   Vital Signs:  Blood Pressure 132/76   Pulse 76   Temperature 97.6 °F (36.4 °C)   Height 182.9 cm (72\")   Weight 95.5 kg (210 lb 9.6 oz)   Oxygen Saturation 98%   Body Mass Index 28.56 kg/m²     Physical Exam  Vitals and nursing note reviewed.   Constitutional:       Appearance: Normal appearance. He is well-developed.   HENT:      Head: Normocephalic and atraumatic.      Nose: Nose normal.      Mouth/Throat:      Mouth: Mucous membranes are moist.      Pharynx: Oropharynx is clear.   Eyes:      Extraocular Movements: Extraocular movements intact.      Conjunctiva/sclera: Conjunctivae normal.      Pupils: Pupils are equal, round, and reactive to light.   Cardiovascular:      Rate and Rhythm: Normal rate and regular rhythm.      Heart sounds: Normal heart sounds. No murmur heard.    No friction rub. No gallop.   Pulmonary:      Effort: Pulmonary effort is normal. No respiratory distress.      Breath sounds: Normal breath sounds. No stridor. No wheezing, rhonchi or rales.   Chest:      Chest wall: No tenderness.   Abdominal:      General: Bowel sounds are normal.      Palpations: Abdomen is soft.   Musculoskeletal:         General: Normal range of motion.      Cervical back: Normal range of motion and neck supple.   Skin:     General: Skin is warm and dry.   Neurological:      General: No focal deficit present.      Mental Status: He is alert and oriented to person, place, and time. Mental status is at baseline.   Psychiatric:         Mood and Affect: Mood normal.         Behavior: Behavior normal.         Thought Content: Thought content normal.         Judgment: Judgment normal.          The following data was " reviewed by: Smith Cabello MD on 04/04/2023:  Common labs    Common Labs 9/20/22 9/20/22 9/20/22 3/28/23 3/28/23 3/28/23 3/28/23    0826 0826 0826 0837 0837 0837 0837   Glucose  94     99   BUN  16     17   Creatinine  1.01     0.99   Sodium  143     140   Potassium  4.5     4.1   Chloride  105     101   Calcium  10.2     10.2   Total Protein       7.5   Albumin  4.80     4.5   Total Bilirubin  1.1     1.5 (A)   Alkaline Phosphatase  90     90   AST (SGOT)  24     30   ALT (SGPT)  20     30   WBC 5.85     6.48    Hemoglobin 14.2     14.5    Hematocrit 40.6     41.9    Platelets 257     256    Total Cholesterol   98       Total Cholesterol     124     Triglycerides   64  128     HDL Cholesterol   41  40     LDL Cholesterol    43  61     PSA    4.690 (A)      (A) Abnormal value       Comments are available for some flowsheets but are not being displayed.           Data reviewed: NA           Assessment and Plan  #1 wellness exam  Diagnoses and all orders for this visit:    1. Essential hypertension (Primary)    2. Dyslipidemia    3. Primary osteoarthritis involving multiple joints    4. Medicare annual wellness visit, subsequent           I spent 24 minutes caring for Jose Luis on this date of service. This time includes time spent by me in the following activities:reviewing tests, obtaining and/or reviewing a separately obtained history, performing a medically appropriate examination and/or evaluation , counseling and educating the patient/family/caregiver, ordering medications, tests, or procedures and referring and communicating with other health care professionals   Follow Up   Return in about 6 months (around 10/4/2023), or if symptoms worsen or fail to improve, for Recheck.  Patient was given instructions and counseling regarding his condition or for health maintenance advice. Please see specific information pulled into the AVS if appropriate.

## 2023-04-04 NOTE — PATIENT INSTRUCTIONS
Medicare Wellness  Personal Prevention Plan of Service     Date of Office Visit:    Encounter Provider:  Smith Cabello MD  Place of Service:  [facility]  Patient Name: Jose Luis Davis  :  1950    As part of the Medicare Wellness portion of your visit today, we are providing you with this personalized preventive plan of services (PPPS). This plan is based upon recommendations of the [place] Preventive Services Task Force (USPSTF) and the Advisory Committee on Immunization Practices (ACIP).    This lists the preventive care services that should be considered, and provides dates of when you are due. Items listed as completed are up-to-date and do not require any further intervention.    Health Maintenance   Topic Date Due    HEPATITIS C SCREENING  Never done    Pneumococcal Vaccine 65+ (2 - PPSV23 if available, else PCV20) 10/13/2022    ANNUAL WELLNESS VISIT  2023    ZOSTER VACCINE (2 of 2) 2023 (Originally 2017)    INFLUENZA VACCINE  2023    TDAP/TD VACCINES (2 - Td or Tdap) 2027    COLORECTAL CANCER SCREENING  2027    COVID-19 Vaccine  Completed    AAA SCREEN (ONE-TIME)  Completed       No orders of the defined types were placed in this encounter.      No follow-ups on file.

## 2023-04-08 ENCOUNTER — APPOINTMENT (OUTPATIENT)
Dept: MRI IMAGING | Facility: HOSPITAL | Age: 73
End: 2023-04-08
Payer: MEDICARE

## 2023-04-08 ENCOUNTER — APPOINTMENT (OUTPATIENT)
Dept: CT IMAGING | Facility: HOSPITAL | Age: 73
End: 2023-04-08
Payer: MEDICARE

## 2023-04-08 ENCOUNTER — HOSPITAL ENCOUNTER (OUTPATIENT)
Facility: HOSPITAL | Age: 73
Setting detail: OBSERVATION
Discharge: HOME | End: 2023-04-09
Attending: EMERGENCY MEDICINE | Admitting: EMERGENCY MEDICINE
Payer: MEDICARE

## 2023-04-08 DIAGNOSIS — H81.399 PERIPHERAL VERTIGO, UNSPECIFIED LATERALITY: Primary | ICD-10-CM

## 2023-04-08 DIAGNOSIS — H81.12 BPPV (BENIGN PAROXYSMAL POSITIONAL VERTIGO), LEFT: ICD-10-CM

## 2023-04-08 PROBLEM — R42 DIZZINESS: Status: ACTIVE | Noted: 2023-04-08

## 2023-04-08 LAB
ANION GAP SERPL CALCULATED.3IONS-SCNC: 11.5 MMOL/L (ref 5–15)
BASOPHILS # BLD AUTO: 0.02 10*3/MM3 (ref 0–0.2)
BASOPHILS NFR BLD AUTO: 0.3 % (ref 0–1.5)
BUN SERPL-MCNC: 12 MG/DL (ref 8–23)
BUN/CREAT SERPL: 12.8 (ref 7–25)
CALCIUM SPEC-SCNC: 10.4 MG/DL (ref 8.6–10.5)
CHLORIDE SERPL-SCNC: 102 MMOL/L (ref 98–107)
CO2 SERPL-SCNC: 23.5 MMOL/L (ref 22–29)
CREAT SERPL-MCNC: 0.94 MG/DL (ref 0.76–1.27)
DEPRECATED RDW RBC AUTO: 40.8 FL (ref 37–54)
EGFRCR SERPLBLD CKD-EPI 2021: 86.7 ML/MIN/1.73
EOSINOPHIL # BLD AUTO: 0.03 10*3/MM3 (ref 0–0.4)
EOSINOPHIL NFR BLD AUTO: 0.5 % (ref 0.3–6.2)
ERYTHROCYTE [DISTWIDTH] IN BLOOD BY AUTOMATED COUNT: 12.2 % (ref 12.3–15.4)
GLUCOSE SERPL-MCNC: 123 MG/DL (ref 65–99)
HCT VFR BLD AUTO: 43 % (ref 37.5–51)
HGB BLD-MCNC: 15.4 G/DL (ref 13–17.7)
IMM GRANULOCYTES # BLD AUTO: 0.01 10*3/MM3 (ref 0–0.05)
IMM GRANULOCYTES NFR BLD AUTO: 0.2 % (ref 0–0.5)
LYMPHOCYTES # BLD AUTO: 1.88 10*3/MM3 (ref 0.7–3.1)
LYMPHOCYTES NFR BLD AUTO: 32.2 % (ref 19.6–45.3)
MCH RBC QN AUTO: 32.8 PG (ref 26.6–33)
MCHC RBC AUTO-ENTMCNC: 35.8 G/DL (ref 31.5–35.7)
MCV RBC AUTO: 91.5 FL (ref 79–97)
MONOCYTES # BLD AUTO: 0.55 10*3/MM3 (ref 0.1–0.9)
MONOCYTES NFR BLD AUTO: 9.4 % (ref 5–12)
NEUTROPHILS NFR BLD AUTO: 3.34 10*3/MM3 (ref 1.7–7)
NEUTROPHILS NFR BLD AUTO: 57.4 % (ref 42.7–76)
NRBC BLD AUTO-RTO: 0 /100 WBC (ref 0–0.2)
PLATELET # BLD AUTO: 265 10*3/MM3 (ref 140–450)
PMV BLD AUTO: 9.8 FL (ref 6–12)
POTASSIUM SERPL-SCNC: 3.9 MMOL/L (ref 3.5–5.2)
RBC # BLD AUTO: 4.7 10*6/MM3 (ref 4.14–5.8)
SODIUM SERPL-SCNC: 137 MMOL/L (ref 136–145)
WBC NRBC COR # BLD: 5.83 10*3/MM3 (ref 3.4–10.8)

## 2023-04-08 PROCEDURE — 85025 COMPLETE CBC W/AUTO DIFF WBC: CPT | Performed by: EMERGENCY MEDICINE

## 2023-04-08 PROCEDURE — G0378 HOSPITAL OBSERVATION PER HR: HCPCS

## 2023-04-08 PROCEDURE — 70551 MRI BRAIN STEM W/O DYE: CPT

## 2023-04-08 PROCEDURE — 99284 EMERGENCY DEPT VISIT MOD MDM: CPT

## 2023-04-08 PROCEDURE — 25010000002 DIPHENHYDRAMINE PER 50 MG: Performed by: PHYSICIAN ASSISTANT

## 2023-04-08 PROCEDURE — 25010000002 PROCHLORPERAZINE 10 MG/2ML SOLUTION: Performed by: PHYSICIAN ASSISTANT

## 2023-04-08 PROCEDURE — 96374 THER/PROPH/DIAG INJ IV PUSH: CPT

## 2023-04-08 PROCEDURE — 96375 TX/PRO/DX INJ NEW DRUG ADDON: CPT

## 2023-04-08 PROCEDURE — 80048 BASIC METABOLIC PNL TOTAL CA: CPT | Performed by: EMERGENCY MEDICINE

## 2023-04-08 PROCEDURE — 25010000002 KETOROLAC TROMETHAMINE PER 15 MG: Performed by: PHYSICIAN ASSISTANT

## 2023-04-08 PROCEDURE — 70450 CT HEAD/BRAIN W/O DYE: CPT

## 2023-04-08 RX ORDER — ROSUVASTATIN CALCIUM 20 MG/1
40 TABLET, COATED ORAL DAILY
Status: DISCONTINUED | OUTPATIENT
Start: 2023-04-08 | End: 2023-04-09 | Stop reason: HOSPADM

## 2023-04-08 RX ORDER — SODIUM CHLORIDE 0.9 % (FLUSH) 0.9 %
10 SYRINGE (ML) INJECTION AS NEEDED
Status: DISCONTINUED | OUTPATIENT
Start: 2023-04-08 | End: 2023-04-09 | Stop reason: HOSPADM

## 2023-04-08 RX ORDER — ONDANSETRON 2 MG/ML
4 INJECTION INTRAMUSCULAR; INTRAVENOUS EVERY 6 HOURS PRN
Status: DISCONTINUED | OUTPATIENT
Start: 2023-04-08 | End: 2023-04-09 | Stop reason: HOSPADM

## 2023-04-08 RX ORDER — KETOROLAC TROMETHAMINE 15 MG/ML
15 INJECTION, SOLUTION INTRAMUSCULAR; INTRAVENOUS ONCE
Status: COMPLETED | OUTPATIENT
Start: 2023-04-08 | End: 2023-04-08

## 2023-04-08 RX ORDER — MECLIZINE HYDROCHLORIDE 25 MG/1
50 TABLET ORAL ONCE
Status: COMPLETED | OUTPATIENT
Start: 2023-04-08 | End: 2023-04-08

## 2023-04-08 RX ORDER — ONDANSETRON 4 MG/1
4 TABLET, FILM COATED ORAL EVERY 6 HOURS PRN
Status: DISCONTINUED | OUTPATIENT
Start: 2023-04-08 | End: 2023-04-09 | Stop reason: HOSPADM

## 2023-04-08 RX ORDER — PROCHLORPERAZINE EDISYLATE 5 MG/ML
10 INJECTION INTRAMUSCULAR; INTRAVENOUS ONCE
Status: COMPLETED | OUTPATIENT
Start: 2023-04-08 | End: 2023-04-08

## 2023-04-08 RX ORDER — ACETAMINOPHEN 325 MG/1
650 TABLET ORAL EVERY 4 HOURS PRN
Status: DISCONTINUED | OUTPATIENT
Start: 2023-04-08 | End: 2023-04-09 | Stop reason: HOSPADM

## 2023-04-08 RX ORDER — NITROGLYCERIN 0.4 MG/1
0.4 TABLET SUBLINGUAL
Status: DISCONTINUED | OUTPATIENT
Start: 2023-04-08 | End: 2023-04-09 | Stop reason: HOSPADM

## 2023-04-08 RX ORDER — SODIUM CHLORIDE 0.9 % (FLUSH) 0.9 %
10 SYRINGE (ML) INJECTION EVERY 12 HOURS SCHEDULED
Status: DISCONTINUED | OUTPATIENT
Start: 2023-04-08 | End: 2023-04-09 | Stop reason: HOSPADM

## 2023-04-08 RX ORDER — CHOLECALCIFEROL (VITAMIN D3) 125 MCG
5 CAPSULE ORAL NIGHTLY PRN
Status: DISCONTINUED | OUTPATIENT
Start: 2023-04-08 | End: 2023-04-09 | Stop reason: HOSPADM

## 2023-04-08 RX ORDER — SODIUM CHLORIDE 9 MG/ML
40 INJECTION, SOLUTION INTRAVENOUS AS NEEDED
Status: DISCONTINUED | OUTPATIENT
Start: 2023-04-08 | End: 2023-04-09 | Stop reason: HOSPADM

## 2023-04-08 RX ORDER — DIPHENHYDRAMINE HYDROCHLORIDE 50 MG/ML
25 INJECTION INTRAMUSCULAR; INTRAVENOUS ONCE
Status: COMPLETED | OUTPATIENT
Start: 2023-04-08 | End: 2023-04-08

## 2023-04-08 RX ADMIN — Medication 10 ML: at 20:55

## 2023-04-08 RX ADMIN — Medication 10 ML: at 14:57

## 2023-04-08 RX ADMIN — KETOROLAC TROMETHAMINE 15 MG: 15 INJECTION, SOLUTION INTRAMUSCULAR; INTRAVENOUS at 16:47

## 2023-04-08 RX ADMIN — PROCHLORPERAZINE EDISYLATE 10 MG: 5 INJECTION INTRAMUSCULAR; INTRAVENOUS at 16:46

## 2023-04-08 RX ADMIN — MECLIZINE HYDROCHLORIDE 50 MG: 25 TABLET ORAL at 13:10

## 2023-04-08 RX ADMIN — ROSUVASTATIN CALCIUM 40 MG: 20 TABLET, FILM COATED ORAL at 23:42

## 2023-04-08 RX ADMIN — DIPHENHYDRAMINE HYDROCHLORIDE 25 MG: 50 INJECTION INTRAMUSCULAR; INTRAVENOUS at 16:46

## 2023-04-08 NOTE — ED PROVIDER NOTES
EMERGENCY DEPARTMENT ENCOUNTER    Room Number:  10/10  Date seen:  4/8/2023  PCP: Smith Cabello MD  Historian: Patient, wife at bedside      HPI:  Chief Complaint: Dizziness  A complete HPI/ROS/PMH/PSH/SH/FH are unobtainable due to:   Context: Jose Luis Davis is a 72 y.o. male who presents to the ED c/o dizziness.  Patient had onset of dizziness yesterday.  Dizziness is not bad when his head is still but significantly worsened when he changes position, particularly when laying back or when sitting up suddenly.  Dizziness is described as a spinning sensation.  Patient also describes mild headache at the top of his head which is similar to prior migraines but less severe.  Denies change in vision, change in speech.  No significant numbness or weakness or loss of coordination.  There is been no double vision or slurred speech.      MEDICAL RECORD REVIEW (non ED)  I reviewed prior medical records including recent office visit with Dr. Cabello.  Patient seen for routine visit and has history of hypertension, dyslipidemia and primary osteoarthritis.  Review of prior charting reveals history of headache described as tension type and migraine.  According to prior neurology note, patient used to have frequent headaches and was treated prophylactically with propanolol but is subsequently weaned off.    PAST MEDICAL HISTORY  Active Ambulatory Problems     Diagnosis Date Noted   • Ureterolithiasis 03/10/2016   • Benign nodular prostatic hyperplasia 04/28/2017   • Essential hypertension 04/28/2017   • Dyslipidemia 04/28/2017   • Osteoarthritis of multiple joints 04/28/2017   • Tension type headache 06/20/2018   • Migraine without aura 12/06/2018     Resolved Ambulatory Problems     Diagnosis Date Noted   • No Resolved Ambulatory Problems     Past Medical History:   Diagnosis Date   • Kidney stone          PAST SURGICAL HISTORY  Past Surgical History:   Procedure Laterality Date   • CYSTOSCOPY URETEROSCOPY LASER LITHOTRIPSY  Left 3/12/2016    Procedure: Cyto, Trans urethral left ureterral orifice incision, left ureteroscopy, holmium laser lithotripsy, basket extraction of stone fragment, placement of left double J ureteral stent. ;  Surgeon: Nathan Freeman Jr., MD;  Location: [facility];  Service:          FAMILY HISTORY  History reviewed. No pertinent family history.      SOCIAL HISTORY  Social History     Socioeconomic History   • Marital status:    Tobacco Use   • Smoking status: Former   • Tobacco comments:     quit about 30 yrs ago   Substance and Sexual Activity   • Alcohol use: Yes     Alcohol/week: 6.0 standard drinks     Types: 6 Cans of beer per week   • Drug use: No         ALLERGIES  Patient has no known allergies.        REVIEW OF SYSTEMS  Review of Systems   Constitutional: Negative for fever.   Respiratory: Negative for shortness of breath.    Cardiovascular: Negative for chest pain.   Neurological: Positive for dizziness.   All other systems reviewed and are negative.           PHYSICAL EXAM  ED Triage Vitals [04/08/23 1241]   Temp Heart Rate Resp BP SpO2   97.8 °F (36.6 °C) 106 18 -- 97 %      Temp src Heart Rate Source Patient Position BP Location FiO2 (%)   Tympanic Monitor -- -- --       Physical Exam    GENERAL: Alert and pleasant male who looks younger than stated age.  Triage vitals are reviewed and are notable for mildly elevated blood pressure of 161/103.  HENT: nares patent-TMs unremarkable  EYES: no scleral icterus  CV: regular rhythm, regular rate-no murmur  RESPIRATORY: normal effort, clear to auscultation bilaterally  ABDOMEN: soft, nontender to palpation  MUSCULOSKELETAL: no deformity  NEURO: Strength sensation and coordination are grossly intact.  Speech and mentation are unremarkable  Sallie-Hallpike maneuver- marked dizziness is created upon laying back or sitting up.  Turning to the right or left does not significantly change patient's dizziness.  There is mild nystagmus upon laying back or  sitting up which is fatigable.  SKIN: warm, dry      Vital signs and nursing notes reviewed.          LAB RESULTS  Recent Results (from the past 24 hour(s))   Basic Metabolic Panel    Collection Time: 04/08/23  1:07 PM    Specimen: Blood   Result Value Ref Range    Glucose 123 (H) 65 - 99 mg/dL    BUN 12 8 - 23 mg/dL    Creatinine 0.94 0.76 - 1.27 mg/dL    Sodium 137 136 - 145 mmol/L    Potassium 3.9 3.5 - 5.2 mmol/L    Chloride 102 98 - 107 mmol/L    CO2 23.5 22.0 - 29.0 mmol/L    Calcium 10.4 8.6 - 10.5 mg/dL    BUN/Creatinine Ratio 12.8 7.0 - 25.0    Anion Gap 11.5 5.0 - 15.0 mmol/L    eGFR 86.7 >60.0 mL/min/1.73   CBC Auto Differential    Collection Time: 04/08/23  1:07 PM    Specimen: Blood   Result Value Ref Range    WBC 5.83 3.40 - 10.80 10*3/mm3    RBC 4.70 4.14 - 5.80 10*6/mm3    Hemoglobin 15.4 13.0 - 17.7 g/dL    Hematocrit 43.0 37.5 - 51.0 %    MCV 91.5 79.0 - 97.0 fL    MCH 32.8 26.6 - 33.0 pg    MCHC 35.8 (H) 31.5 - 35.7 g/dL    RDW 12.2 (L) 12.3 - 15.4 %    RDW-SD 40.8 37.0 - 54.0 fl    MPV 9.8 6.0 - 12.0 fL    Platelets 265 140 - 450 10*3/mm3    Neutrophil % 57.4 42.7 - 76.0 %    Lymphocyte % 32.2 19.6 - 45.3 %    Monocyte % 9.4 5.0 - 12.0 %    Eosinophil % 0.5 0.3 - 6.2 %    Basophil % 0.3 0.0 - 1.5 %    Immature Grans % 0.2 0.0 - 0.5 %    Neutrophils, Absolute 3.34 1.70 - 7.00 10*3/mm3    Lymphocytes, Absolute 1.88 0.70 - 3.10 10*3/mm3    Monocytes, Absolute 0.55 0.10 - 0.90 10*3/mm3    Eosinophils, Absolute 0.03 0.00 - 0.40 10*3/mm3    Basophils, Absolute 0.02 0.00 - 0.20 10*3/mm3    Immature Grans, Absolute 0.01 0.00 - 0.05 10*3/mm3    nRBC 0.0 0.0 - 0.2 /100 WBC       Ordered the above labs and independently interpreted results. My findings will be discussed in the medical decision making section below        RADIOLOGY  CT Head Without Contrast    Result Date: 4/8/2023  CT HEAD WITHOUT CONTRAST  HISTORY: Dizziness.  COMPARISON: CT head 06/04/2018.  FINDINGS: The brain and ventricles are  symmetrical. There is no evidence of hemorrhage, hydrocephalus or of abnormal extraaxial fluid. No focal area of decreased attenuation to suggest acute infarction is identified. Further evaluation could be performed with an MRI examination of the brain as indicated.    Radiation dose reduction techniques were utilized, including automated exposure control and exposure modulation based on body size.         I ordered and independently reviewed the above noted radiographic studies.      I viewed images of CT head which showed no obvious pathology per my independent interpretation.    See radiologist's dictation for official interpretation.             PROCEDURES  Procedures          MEDICATIONS GIVEN IN ER  Medications   sodium chloride 0.9 % flush 10 mL (has no administration in time range)   meclizine (ANTIVERT) tablet 50 mg (50 mg Oral Given 4/8/23 1310)               MEDICAL DECISION MAKING, PROGRESS, and CONSULTS    All labs have been independently reviewed by me.  All radiology studies have been reviewed by me and I have also reviewed the radiology report.   EKG's independently viewed and interpreted by me.  Discussion below represents my analysis of pertinent findings related to patient's condition, differential diagnosis, treatment plan and final disposition.      Additional sources:  - Discussed/ obtained information from independent historians: Sister at bedside    - External (non-ED) record review: Please see documented above    - Chronic or social conditions impacting care: History of prior headaches    - Shared decision making: I discussed ED evaluation and treatment plan with patient who is in agreement.  Patient with onset of dizziness and some headache.  Neurologic and bedside exam suggests likely peripheral vertigo.    CT scan and labs benign.    Given oral meclizine without any significant improvement.  Patient still with significant vertiginous symptoms upon changing head positions.  Will admit to  observation unit for further evaluation treatment possible physical therapy to see and perform Epley maneuver.        Orders placed during this visit:  Orders Placed This Encounter   Procedures   • CT Head Without Contrast   • Basic Metabolic Panel   • CBC Auto Differential   • Cardiac Monitoring   • Insert Peripheral IV   • Initiate ED Observation Status   • CBC & Differential           Differential diagnosis:    Please see as documented below in ED course      Independent interpretation of labs, radiology studies, and discussions with consultants:  ED Course as of 04/08/23 1419   Sat Apr 08, 2023   [age] [id number]-old male  with history of hypertension, hyperlipidemia and peripheral vascular disease presents with dizziness over the last 48 hours.    On exam he is well-appearing and slightly hypertensive.  Dizziness is reproduced by laying back or sitting up, changing head position.  Cerebellar testing normal.    MDM-suspect likely benign peripheral vertigo.  Patient is having headache which is atypical.  We will go ahead and get a head CT and some labs.  We will try some oral meclizine and see how he does.  Other causes in the differential would include atypical migraine, vestibular migraine, cerebellar stroke is felt to be less likely without other cerebellar findings. [DB]   1411 CT scan of the brain independently interpreted by me shows no obvious hemorrhage or stroke.    Official radiology interpretation by Dr. Jolley also shows no obvious acute pathology. [DB]   1411 Labs reviewed are benign with normal white blood count and chemistries. [DB]      ED Course User Index  [DB] Reg Long MD             I used full protective equipment while examining this patient.  This includes face mask, gloves and protective eyewear.  I washed my hands before entering the room and immediately upon leaving the room    DIAGNOSIS  Final diagnoses:   Peripheral vertigo, unspecified laterality          DISPOSITION  Observation admission            Latest Documented Vital Signs:  As of 14:19 EDT  BP- (!) 153/104 HR- 86 Temp- 97.8 °F (36.6 °C) (Tympanic) O2 sat- 95%              --    Please note that portions of this were completed with a voice recognition program.       Note Disclaimer: At [facility], we believe that sharing information builds trust and better relationships. You are receiving this note because you are receiving care at [facility] or recently visited. It is possible you will see health information before a provider has talked with you about it. This kind of information can be easy to misunderstand. To help you fully understand what it means for your health, we urge you to discuss this note with your provider.           Reg Long MD  04/08/23 8724

## 2023-04-08 NOTE — PROGRESS NOTES
MD ATTESTATION NOTE    The AMY and I have discussed this patient's history, physical exam, and treatment plan.  I have reviewed the documentation and personally had a face to face interaction with the patient. I affirm the documentation and agree with the treatment and plan.  The attached note describes my personal findings.      I provided a substantive portion of the care of the patient.  I personally performed the physical exam in its entirety, and below are my findings.  For this patient encounter, the patient wore surgical mask, I wore full protective PPE including N95 and eye protection.      Brief HPI: Patient admitted with complaint of headache and dizziness.  Patient's had symptoms since yesterday worse with moving his head and positional changes.  Symptoms did not improve with meclizine but migraine cocktail did help.  MRI of head pending    PHYSICAL EXAM  ED Triage Vitals   Temp Heart Rate Resp BP SpO2   04/08/23 1241 04/08/23 1241 04/08/23 1241 04/08/23 1252 04/08/23 1241   97.8 °F (36.6 °C) 106 18 (!) 161/103 97 %      Temp src Heart Rate Source Patient Position BP Location FiO2 (%)   04/08/23 1241 04/08/23 1241 04/08/23 1618 04/08/23 1618 --   Tympanic Monitor Lying Right arm          GENERAL: no acute distress  HENT: nares patent  EYES: no scleral icterus  CV: regular rhythm, normal rate  RESPIRATORY: normal effort  ABDOMEN: soft  MUSCULOSKELETAL: no deformity  NEURO: alert, moves all extremities, follows commands  PSYCH:  calm, cooperative  SKIN: warm, dry    Vital signs and nursing notes reviewed.        Plan: Neurology consulted.  PT consulted.

## 2023-04-08 NOTE — H&P
[facility]   HISTORY AND PHYSICAL    Patient Name: Jose Luis Davis  : 1950  MRN: [id number]  Primary Care Physician:  Smith Cabello MD  Date of admission: 2023    Subjective   Subjective     Chief Complaint:   Chief Complaint   Patient presents with   • Headache   • Dizziness         HPI:    Jose Luis Davis is a 72 y.o. male with history of hyperlipidemia who presented to the emergency department today complaining of headache and dizziness.  Patient states yesterday around 5 AM he had sudden onset dizziness that is worse with moving his head and position changes.  Patient states it was pretty constant throughout the day but definitely made worse with any sort of head movement.  Patient reports it is a spinning sensation.  Patient also developed a headache today, describes it as a dull pain on top of head.  Patient states he does have history of headaches and was treated for headaches several years ago but they have been well controlled since then.  Patient denies facial droop, slurred speech, unilateral weakness, numbness, tingling.  Patient denies chest pain, shortness of breath, palpitations, nausea, vomiting, diarrhea, fever, chills, recent sick contacts.    ED evaluation reviewed, laboratory evaluation largely within normal limits, CT head negative acute.    Review of Systems   All systems were reviewed and negative except for: What is discussed in the HPI    Personal History     Past Medical History:   Diagnosis Date   • Essential hypertension 2017   • Kidney stone    • Osteoarthritis of multiple joints 2017       Past Surgical History:   Procedure Laterality Date   • CYSTOSCOPY URETEROSCOPY LASER LITHOTRIPSY Left 3/12/2016    Procedure: Cyto, Trans urethral left ureterral orifice incision, left ureteroscopy, holmium laser lithotripsy, basket extraction of stone fragment, placement of left double J ureteral stent. ;  Surgeon: Nathan Freeman Jr., MD;  Location: [facility] OR;   Service:        Family History: family history is not on file. Otherwise pertinent FHx was reviewed and not pertinent to current issue.    Social History:  reports that he has quit smoking. He does not have any smokeless tobacco history on file. He reports current alcohol use of about 6.0 standard drinks per week. He reports that he does not use drugs.    Home Medications:  Coenzyme Q10, Saw Palmetto, magnesium gluconate, multivitamin with minerals, and rosuvastatin    Allergies:  No Known Allergies    Objective   Objective     Vitals:   Temp:  [97.8 °F (36.6 °C)-98.1 °F (36.7 °C)] 98.1 °F (36.7 °C)  Heart Rate:  [] 75  Resp:  [18] 18  BP: (142-164)/() 142/94  Physical Exam     GENERAL: 72 y.o. YO male a/o x 4, NAD  SKIN: Warm pink and dry   HEENT:  PERRL, EOM intact, conjunctivae normal, sclerae clear, +nystagmus  NECK: supple, no JVD  LUNGS: Clear to auscultation bilaterally without wheezes, rales or rhonchi.  No accessory muscle use and no nasal flaring.  CARDIAC:  Regular rate and rhythm, S1-S2.  No murmurs, rubs or gallops.  No peripheral edema.  Equal pulses bilaterally.  ABDOMEN: Soft, nontender, nondistended.  No guarding or rebound tenderness.  Normal bowel sounds.  MUSCULOSKELETAL: Moves all extremities well.  No deformity.  NEURO: Cranial nerves II through XII grossly intact.  No gross focal deficits.  Alert.  Normal speech and motor.  PSYCH: Normal mood and affect      Result Review    Result Review:  I have personally reviewed the results from the time of this admission to 4/8/2023 17:33 EDT and agree with these findings:  [x]  Laboratory list / accordion  []  Microbiology  [x]  Radiology  []  EKG/Telemetry   []  Cardiology/Vascular   []  Pathology  []  Old records  []  Other:  Most notable findings include: Labs largely within normal limits except glucose of 123, CT head negative acute    Assessment & Plan   Assessment / Plan     Brief Patient Summary:  Jose Luis Davis is a 72 y.o. male  who is being admitted to observation unit for further evaluation of headache and dizziness    Active Hospital Problems:  Active Hospital Problems    Diagnosis    • **Dizziness      Plan:     Dizziness  Headache  -CT head negative acute  -Check MRI brain  -Consult neurology  -Consult physical therapy  -Patient received meclizine in ED without much improvement  -We will give Compazine, Benadryl, and Toradol x1 now  -Continue to monitor    Hyperlipidemia  -Continue statin    DVT prophylaxis:  Mechanical DVT prophylaxis orders are present.    CODE STATUS:    Level Of Support Discussed With: Patient  Code Status (Patient has no pulse and is not breathing): CPR (Attempt to Resuscitate)  Medical Interventions (Patient has pulse or is breathing): Full Support    Admission Status:  I believe this patient meets observation status.    60 minutes has been spent by [facility] providers in the care of this patient while under observation status    I wore an N95 mask, face shield, and gloves during this patient encounter. Patient also wearing a surgical mask throughout encounter. Hand hygeine performed before and after seeing the patient.    Electronically signed by TIN Hwang, 04/08/23, 5:33 PM EDT.

## 2023-04-08 NOTE — ED TRIAGE NOTES
Pt c/o headache and dizziness that started around 0500 yesterday. Dizziness is not constant but is frequent    This RN wore mask and goggles during time of contact

## 2023-04-08 NOTE — ED NOTES
Nursing report ED to floor  Jose Luis Davis  72 y.o.  male    HPI :   Chief Complaint   Patient presents with    Headache    Dizziness       Admitting doctor:   Warren SCHAFFER MD    Admitting diagnosis:   The encounter diagnosis was Peripheral vertigo, unspecified laterality.    Code status:   Current Code Status       Date Active Code Status Order ID Comments User Context       4/8/2023 1421 CPR (Attempt to Resuscitate) [id number]  Marybeth Flores PA ED        Question Answer    Code Status (Patient has no pulse and is not breathing) CPR (Attempt to Resuscitate)    Medical Interventions (Patient has pulse or is breathing) Full Support    Level Of Support Discussed With Patient                    Allergies:   Patient has no known allergies.    Isolation:   No active isolations    Intake and Output  No intake or output data in the 24 hours ending 04/08/23 1456    Weight:       04/08/23  1241   Weight: 95.3 kg (210 lb)       Most recent vitals:   Vitals:    04/08/23 1301 04/08/23 1330 04/08/23 1331 04/08/23 1333   BP: 157/97  (!) 153/104    Pulse: 94 88 81 86   Resp:       Temp:       TempSrc:       SpO2: 93% 96% 95% 95%   Weight:       Height:           Active LDAs/IV Access:   Lines, Drains & Airways       Active LDAs       Name Placement date Placement time Site Days    Peripheral IV 04/08/23 1311 Right Antecubital 04/08/23  1311  Antecubital  less than 1                    Labs (abnormal labs have a star):   Labs Reviewed   BASIC METABOLIC PANEL - Abnormal; Notable for the following components:       Result Value    Glucose 123 (*)     All other components within normal limits    Narrative:     GFR Normal >60  Chronic Kidney Disease <60  Kidney Failure <15    The GFR formula is only valid for adults with stable renal function between ages 18 and 70.   CBC WITH AUTO DIFFERENTIAL - Abnormal; Notable for the following components:    MCHC 35.8 (*)     RDW 12.2 (*)     All other components within normal limits    CBC AND DIFFERENTIAL    Narrative:     The following orders were created for panel order CBC & Differential.  Procedure                               Abnormality         Status                     ---------                               -----------         ------                     CBC Auto Differential[[id number]]        Abnormal            Final result                 Please view results for these tests on the individual orders.       EKG:   No orders to display       Meds given in ED:   Medications   sodium chloride 0.9 % flush 10 mL (has no administration in time range)   sodium chloride 0.9 % flush 10 mL (has no administration in time range)   sodium chloride 0.9 % flush 10 mL (has no administration in time range)   sodium chloride 0.9 % infusion 40 mL (has no administration in time range)   ondansetron (ZOFRAN) tablet 4 mg (has no administration in time range)     Or   ondansetron (ZOFRAN) injection 4 mg (has no administration in time range)   nitroglycerin (NITROSTAT) SL tablet 0.4 mg (has no administration in time range)   acetaminophen (TYLENOL) tablet 650 mg (has no administration in time range)   melatonin tablet 5 mg (has no administration in time range)   meclizine (ANTIVERT) tablet 50 mg (50 mg Oral Given 4/8/23 1310)       Imaging results:  No radiology results for the last day    Ambulatory status:   - ad antony    Social issues:   Social History     Socioeconomic History    Marital status:    Tobacco Use    Smoking status: Former    Tobacco comments:     quit about 30 yrs ago   Substance and Sexual Activity    Alcohol use: Yes     Alcohol/week: 6.0 standard drinks     Types: 6 Cans of beer per week    Drug use: No       NIH Stroke Scale:         Smith Mojica RN  04/08/23 14:56 EDT

## 2023-04-09 ENCOUNTER — READMISSION MANAGEMENT (OUTPATIENT)
Dept: CALL CENTER | Facility: HOSPITAL | Age: 73
End: 2023-04-09
Payer: MEDICARE

## 2023-04-09 VITALS
HEART RATE: 61 BPM | RESPIRATION RATE: 16 BRPM | SYSTOLIC BLOOD PRESSURE: 123 MMHG | HEIGHT: 72 IN | TEMPERATURE: 98 F | DIASTOLIC BLOOD PRESSURE: 77 MMHG | WEIGHT: 210 LBS | BODY MASS INDEX: 28.44 KG/M2 | OXYGEN SATURATION: 96 %

## 2023-04-09 LAB
ANION GAP SERPL CALCULATED.3IONS-SCNC: 9 MMOL/L (ref 5–15)
BUN SERPL-MCNC: 15 MG/DL (ref 8–23)
BUN/CREAT SERPL: 14.7 (ref 7–25)
CALCIUM SPEC-SCNC: 9.3 MG/DL (ref 8.6–10.5)
CHLORIDE SERPL-SCNC: 108 MMOL/L (ref 98–107)
CO2 SERPL-SCNC: 22 MMOL/L (ref 22–29)
CREAT SERPL-MCNC: 1.02 MG/DL (ref 0.76–1.27)
DEPRECATED RDW RBC AUTO: 40.8 FL (ref 37–54)
EGFRCR SERPLBLD CKD-EPI 2021: 78.6 ML/MIN/1.73
ERYTHROCYTE [DISTWIDTH] IN BLOOD BY AUTOMATED COUNT: 11.9 % (ref 12.3–15.4)
GLUCOSE SERPL-MCNC: 88 MG/DL (ref 65–99)
HCT VFR BLD AUTO: 40.4 % (ref 37.5–51)
HGB BLD-MCNC: 14.2 G/DL (ref 13–17.7)
MCH RBC QN AUTO: 32.3 PG (ref 26.6–33)
MCHC RBC AUTO-ENTMCNC: 35.1 G/DL (ref 31.5–35.7)
MCV RBC AUTO: 91.8 FL (ref 79–97)
PLATELET # BLD AUTO: 220 10*3/MM3 (ref 140–450)
PMV BLD AUTO: 10 FL (ref 6–12)
POTASSIUM SERPL-SCNC: 4 MMOL/L (ref 3.5–5.2)
RBC # BLD AUTO: 4.4 10*6/MM3 (ref 4.14–5.8)
SODIUM SERPL-SCNC: 139 MMOL/L (ref 136–145)
WBC NRBC COR # BLD: 6.83 10*3/MM3 (ref 3.4–10.8)

## 2023-04-09 PROCEDURE — 85027 COMPLETE CBC AUTOMATED: CPT | Performed by: PHYSICIAN ASSISTANT

## 2023-04-09 PROCEDURE — G0378 HOSPITAL OBSERVATION PER HR: HCPCS

## 2023-04-09 PROCEDURE — 80048 BASIC METABOLIC PNL TOTAL CA: CPT | Performed by: PHYSICIAN ASSISTANT

## 2023-04-09 RX ORDER — SCOLOPAMINE TRANSDERMAL SYSTEM 1 MG/1
1 PATCH, EXTENDED RELEASE TRANSDERMAL
Qty: 10 EACH | Refills: 2 | Status: SHIPPED | OUTPATIENT
Start: 2023-04-09

## 2023-04-09 RX ORDER — LORAZEPAM 2 MG/ML
0.5 INJECTION INTRAMUSCULAR ONCE
Status: DISCONTINUED | OUTPATIENT
Start: 2023-04-09 | End: 2023-04-09

## 2023-04-09 RX ADMIN — Medication 10 ML: at 08:01

## 2023-04-09 NOTE — PLAN OF CARE
Goal Outcome Evaluation: pt states no dizziness since vestibular therapy maneuver. Pt ambulated w nurse in unit. Vss. States understanding of follow up appointments and instructions. Taking all belongings and leaving via car w family.

## 2023-04-09 NOTE — PROGRESS NOTES
Pt presents to the ED c/o  positional dizziness and lightheadedness.  Symptoms seem to resolve with rest.  Denies any significant prior issues, improved with a migraine cocktail, but still having symptoms with positional change.  No focal numbness or weakness of the arms or legs, no chest pains, shortness of breath.      On exam,   General: No acute distress, nontoxic  HEENT: Mucous membranes moist, atraumatic, EOMI  Neck: Full ROM  Pulm: Symmetric chest rise, nonlabored, lungs CTAB  Cardiovascular: Regular rate and rhythm, intact distal pulses  MSK: Full ROM, no deformity  Skin: Warm, dry  Neuro: Awake, alert, oriented x 4, GCS 15, moving all extremities, no focal deficits  Psych: Calm, cooperative      N95, protective eye goggles, and gloves used during this encounter. Patient in surgical mask.      Plan: Neuroimaging has been unremarkable with no evidence of intracranial hemorrhage or acute CVA.  Some minimal small vessel ischemic disease noted.  Symptoms seem to be more peripheral in nature, again, did seem to improve with a migraine cocktail rather than meclizine, this could be potentially migrainous in nature as well.  Plan for neurology consult and physical therapy consults.         Attestation:  The AMY and I have discussed this patient's history, physical exam, and treatment plan.  I have reviewed the documentation and personally had a face to face interaction with the patient. I affirm the documentation and agree with the treatment and plan.  The attached note describes my personal findings.

## 2023-04-09 NOTE — DISCHARGE SUMMARY
.  ED OBSERVATION PROGRESS/DISCHARGE SUMMARY    Date of Admission: 4/8/2023   LOS: 0 days   PCP: Smith Cabello MD    Subjective   Patient states he continued to have a count of spinning swimming sensation within his head.  He denied any sort of room spinning sensation.  Dizziness has resolved after Epley maneuver.  He denies blurred or double vision.  Denies numbness and tingling or focal weakness.  Denies chest pain or shortness of breath.  Denies abdominal pain and nausea.    Hospital Outcome:  72-year-old male was seen and examined at bedside on admission to observation unit due to dizziness and headache.  Laboratory evaluation relatively unremarkable.  CT head negative acute.  MRI brain without negative for acute infarct.  Patient received migraine cocktail and reports that his headache has resolved since then but continued to complain of dizziness with head movement.    Patient continued to report a spinning sensation within the head.  On exam he had a positive left Sallie-Hallpike that was corrected with Epley maneuver and resolution of symptoms.  Patient able to ambulate without further symptoms.  Will send home with scopolamine patches as needed as well as an outpatient referral to vestibular therapy.  Patient to follow-up with primary care doctor in 1 to 2 weeks.       ROS:  General: no fevers, chills  Respiratory: no cough, dyspnea  Cardiovascular: no chest pain, palpitations  Abdomen: No abdominal pain, nausea, vomiting, or diarrhea  Neurologic: No focal weakness    Objective   Physical Exam:  I have reviewed the vital signs.  Temp:  [97.8 °F (36.6 °C)-98.1 °F (36.7 °C)] 97.9 °F (36.6 °C)  Heart Rate:  [] 64  Resp:  [16-18] 16  BP: ()/() 98/71  General Appearance: Many 2-year-old male, well-nourished, no acute distress on room air  Head:    Normocephalic, atraumatic  Eyes:    Sclerae anicteric, EOMI  Neck:   Supple, no mass  Lungs: Clear to auscultation bilaterally, respirations  unlabored  Heart: Regular rate and rhythm, S1 and S2 normal, no murmur, rub or gallop  Abdomen:  Soft, non-tender, bowel sounds active, nondistended  Extremities: No clubbing, cyanosis, or edema to lower extremities  Pulses:  2+ and symmetric in distal lower extremities  Skin: No rashes   Neurologic: Oriented x3, Normal strength to extremities, positive left Sallie-Hallpike corrected with Epley maneuver    Results Review:    I have reviewed the labs, radiology results and diagnostic studies.    Results from last 7 days   Lab Units 04/08/23  1307   WBC 10*3/mm3 5.83   HEMOGLOBIN g/dL 15.4   HEMATOCRIT % 43.0   PLATELETS 10*3/mm3 265     Results from last 7 days   Lab Units 04/08/23  1307   SODIUM mmol/L 137   POTASSIUM mmol/L 3.9   CHLORIDE mmol/L 102   CO2 mmol/L 23.5   BUN mg/dL 12   CREATININE mg/dL 0.94   CALCIUM mg/dL 10.4   GLUCOSE mg/dL 123*     Imaging Results (Last 24 Hours)     Procedure Component Value Units Date/Time    MRI Brain Without Contrast [[id number]] Collected: 04/08/23 2030     Updated: 04/08/23 2235    Narrative:      MRI EXAMINATION BRAIN WITHOUT CONTRAST     HISTORY: Dizziness.     COMPARISON: CT head 04/08/2023.     TECHNIQUE: A MRI examination of the brain was performed utilizing  sagittal T1, axial diffusion, T1, T2, T2 FLAIR and gradient echo T2  imaging.     FINDINGS: There is no evidence of restricted diffusion to suggest acute  infarction. The brain and ventricles are symmetrical. Increased signal  is noted on the axial T2 FLAIR sequence involving the periventricular  white matter of the cerebral hemispheres bilaterally, consistent with  mild small vessel ischemic disease. There is expected flow-void in the  basilar artery and in the distal aspect of the internal carotid arteries  bilaterally on the axial T2 sequence. A mucus retention cyst with  inspissated fluid is appreciated involving the left maxillary sinus  inferiorly.       Impression:      There is no evidence of acute infarction.  Minimal small  vessel ischemic disease is noted.     This report was finalized on 4/8/2023 10:32 PM by Dr. Teo Pizano M.D.       CT Head Without Contrast [[id number]] Collected: 04/08/23 1341     Updated: 04/08/23 2223    Narrative:      CT HEAD WITHOUT CONTRAST     HISTORY: Dizziness.     COMPARISON: CT head 06/04/2018.     FINDINGS: The brain and ventricles are symmetrical. There is no evidence  of hemorrhage, hydrocephalus or of abnormal extraaxial fluid. No focal  area of decreased attenuation to suggest acute infarction is identified.  Further evaluation could be performed with an MRI examination of the  brain as indicated.           Radiation dose reduction techniques were utilized, including automated  exposure control and exposure modulation based on body size.     This report was finalized on 4/8/2023 10:20 PM by Dr. Teo Pizano M.D.             I have reviewed the medications.  ---------------------------------------------------------------------------------------------  Assessment & Plan   Assessment/Problem List    Dizziness      Plan:    Dizziness  Headache  -CT head negative acute  - MRI brain negative for acute infarct  -Headache improved after migraine cocktail in ER  -Patient with positive left Sallie-Hallpike corrected with Epley and symptoms resolved  -Scopolamine patches as needed at discharge and have placed a vestibular physical therapy referral  -Patient to follow-up with PCP in 1 to 2 weeks.     Hyperlipidemia  -Continue statin    Disposition: Home    Follow-up after Discharge: PCP    45 minutes have been spent by [facility] providers in the care of this patient while under observation status.      I wore an face mask, eye protection, and gloves during this patient encounter. Patient also wearing a surgical mask. Hand hygeine performed before and after seeing the patient.      Debbie Rodrigues PA-C 04/09/23 10:26 EDT

## 2023-04-09 NOTE — DISCHARGE INSTRUCTIONS
You been prescribed scopolamine patches you can put 1 patch behind the ear can leave on for 72 hours as needed for dizziness.  You can also use over-the-counter meclizine as needed for dizziness, follow instructions regarding dosage on the packaging.  I have placed a outpatient referral for vestibular physical therapy  Follow-up with your primary care doctor in 1 to 2 weeks.    Return to the emergency department with worsening symptoms, uncontrolled pain, inability to tolerate oral liquids, fever greater than 101°F not controlled by Tylenol or as needed with emergent concerns.

## 2023-04-09 NOTE — OUTREACH NOTE
Prep Survey    Flowsheet Row Responses   [facility] patient discharged from? [place]   Is LACE score < 7 ? Yes   Eligibility [facility]   Date of Admission 04/08/23   Date of Discharge 04/09/23   Discharge Disposition Home or Self Care   Discharge diagnosis Dizziness   Does the patient have one of the following disease processes/diagnoses(primary or secondary)? Other   Does the patient have Home health ordered? No   Is there a DME ordered? No   Prep survey completed? Yes          Demetra LEUNG - Registered Nurse

## 2023-04-10 ENCOUNTER — TRANSITIONAL CARE MANAGEMENT TELEPHONE ENCOUNTER (OUTPATIENT)
Dept: CALL CENTER | Facility: HOSPITAL | Age: 73
End: 2023-04-10
Payer: MEDICARE

## 2023-04-10 NOTE — OUTREACH NOTE
Call Center TCM Note    Flowsheet Row Responses   [facility] patient discharged from? [place]   Does the patient have one of the following disease processes/diagnoses(primary or secondary)? Other   TCM attempt successful? Yes   Call start time 1537   Call end time 1546   Discharge diagnosis Dizziness   Meds reviewed with patient/caregiver? Yes   Is the patient having any side effects they believe may be caused by any medication additions or changes? No   Does the patient have all medications ordered at discharge? Yes   Is the patient taking all medications as directed (includes completed medication regime)? Yes   Comments Hosp dc fu apt on 4/13/23 with PCP    Does the patient have an appointment with their PCP within 7 days of discharge? Yes   Has home health visited the patient within 72 hours of discharge? --  [(see note under CM)]   Psychosocial issues? No   Comments Will route a message to CM re: Ambulatory Referral to PT Vestibular - pt would like an update    Did the patient receive a copy of their discharge instructions? Yes   Nursing interventions Reviewed instructions with patient   What is the patient's perception of their health status since discharge? Improving   Is the patient/caregiver able to teach back signs and symptoms related to disease process for when to call PCP? Yes   Is the patient/caregiver able to teach back signs and symptoms related to disease process for when to call 911? Yes   Is the patient/caregiver able to teach back the hierarchy of who to call/visit for symptoms/problems? PCP, Specialist, Home health nurse, Urgent Care, ED, 911 Yes   If the patient is a current smoker, are they able to teach back resources for cessation? Not a smoker   TCM call completed? Yes   Call end time 1546          Anne Marie Diaz RN    4/10/2023, 15:46 EDT

## 2023-04-11 NOTE — OUTREACH NOTE
Case Management Call Center Follow-up    Flowsheet Row Responses   [facility] Call Center Tracking Reason? Other (specify in comments)   Other Tracking Comments see note   Has the Call Center Case Management Follow-up issue been resolved? Yes   Follow-up Comments I talked to the patient and he wants to go to [facility].  I called [facility] at that location and they do vistibular treatment there.  I faxed the facesheet and order to them at [phone].  I called and spoke to the patient and let him know that I faxed the order and to give it a few minutes and he can call and set up his first appointment.          Shannon Epley, RN    4/11/2023, 13:11 EDT

## 2023-04-13 ENCOUNTER — OFFICE VISIT (OUTPATIENT)
Dept: FAMILY MEDICINE CLINIC | Facility: CLINIC | Age: 73
End: 2023-04-13
Payer: MEDICARE

## 2023-04-13 VITALS
WEIGHT: 206.2 LBS | HEIGHT: 72 IN | HEART RATE: 82 BPM | DIASTOLIC BLOOD PRESSURE: 88 MMHG | TEMPERATURE: 97.8 F | BODY MASS INDEX: 27.93 KG/M2 | OXYGEN SATURATION: 96 % | SYSTOLIC BLOOD PRESSURE: 132 MMHG

## 2023-04-13 DIAGNOSIS — Z13.228 SCREENING FOR METABOLIC DISORDER: ICD-10-CM

## 2023-04-13 DIAGNOSIS — H81.10 BENIGN PAROXYSMAL POSITIONAL VERTIGO, UNSPECIFIED LATERALITY: ICD-10-CM

## 2023-04-13 DIAGNOSIS — E78.5 DYSLIPIDEMIA: ICD-10-CM

## 2023-04-13 DIAGNOSIS — Z09 HOSPITAL DISCHARGE FOLLOW-UP: Primary | ICD-10-CM

## 2023-04-13 DIAGNOSIS — I10 ESSENTIAL HYPERTENSION: ICD-10-CM

## 2023-04-13 RX ORDER — ROSUVASTATIN CALCIUM 40 MG/1
40 TABLET, COATED ORAL DAILY
Qty: 90 TABLET | Refills: 2 | Status: SHIPPED | OUTPATIENT
Start: 2023-04-13

## 2023-04-13 NOTE — PATIENT INSTRUCTIONS
If your dizziness last for over an hour, please start using the scopolamine patch.  If you have headache, nausea, or vomiting along with the dizziness, please go to the nearest ER until your follow-up with physical therapy.    I have placed labs for you to get completed 1 week before seeing Shanae in the future.    Patient agrees with plan of care and understands instructions. Call if worsening symptoms or any problems or concerns.

## 2023-04-13 NOTE — PROGRESS NOTES
Transitional Care Follow Up Visit  Subjective     Jose Luis Davis is a 72 y.o. male who presents for a transitional care management visit.  Patient is established with Dr. Cabello and new to me.    Within 48 business hours after discharge our office contacted him via telephone to coordinate his care and needs.      I reviewed and discussed the details of that call along with the discharge summary, hospital problems, inpatient lab results, inpatient diagnostic studies, and consultation reports with Jose Luis.     Current outpatient and discharge medications have been reconciled for the patient.  Reviewed by: NICHOLAS Kate          4/9/2023     1:58 PM   Date of TCM Phone Call   [facility]   Date of Admission 4/8/2023   Date of Discharge 4/9/2023   Discharge Disposition Home or Self Care     Risk for Readmission (LACE) Score: 1 (4/9/2023  6:00 AM)      History of Present Illness   Course During Hospital Stay: Patient was admitted to [facility] observation on 4/8/2023 through 4/9/2023 for dizziness related to peripheral vertigo.  Dizziness resolved after Epley maneuver no acute findings on CT head.  MRI brain without contrast negative for acute abnormality.  Patient was discharged home with scopolamine patch and was referred to outpatient vestibular therapy at [facility]. Patient has follow up appointment on 4/24/23. Patient reports his continual dizziness has resolved since doing physical therapy at the hospital, but states he has occasional dizziness with certain head movements.  Patient reports he does not like taking medication and has not used a scopolamine patch yet.     The following portions of the patient's history were reviewed and updated as appropriate: allergies, current medications, past family history, past medical history, past social history, past surgical history and problem list.    Review of Systems    Objective   Physical Exam  Constitutional:       General:  He is awake.      Appearance: Normal appearance.   HENT:      Head: Normocephalic and atraumatic.      Nose: Nose normal.   Eyes:      Extraocular Movements: Extraocular movements intact.      Conjunctiva/sclera: Conjunctivae normal.      Pupils: Pupils are equal, round, and reactive to light.   Cardiovascular:      Rate and Rhythm: Normal rate and regular rhythm.      Pulses: Normal pulses.      Heart sounds: Normal heart sounds.   Pulmonary:      Effort: Pulmonary effort is normal.      Breath sounds: Normal breath sounds and air entry.   Skin:     General: Skin is warm and dry.   Neurological:      General: No focal deficit present.      Mental Status: He is alert and oriented to person, place, and time. Mental status is at baseline.   Psychiatric:         Attention and Perception: Attention normal.         Behavior: Behavior normal. Behavior is cooperative.         Assessment & Plan   Diagnoses and all orders for this visit:    1. Hospital discharge follow-up (Primary)    2. Benign paroxysmal positional vertigo, unspecified laterality    3. Essential hypertension  -     CBC & Differential; Future  -     Comprehensive Metabolic Panel; Future  -     TSH Rfx On Abnormal To Free T4; Future    4. Dyslipidemia  -     CBC & Differential; Future  -     Comprehensive Metabolic Panel; Future  -     Lipid Panel; Future  -     TSH Rfx On Abnormal To Free T4; Future  -     rosuvastatin (CRESTOR) 40 MG tablet; Take 1 tablet by mouth Daily.  Dispense: 90 tablet; Refill: 2    5. Screening for metabolic disorder  -     CBC & Differential; Future  -     Comprehensive Metabolic Panel; Future  -     Lipid Panel; Future  -     TSH Rfx On Abnormal To Free T4; Future  -     HCV Antibody Rfx To Qnt PCR; Future    If your dizziness last for over an hour, please start using the scopolamine patch.  If you have headache, nausea, or vomiting along with the dizziness, please go to the nearest ER until your follow-up with physical  therapy.    I have placed labs for you to get completed 1 week before seeing Shanae in the future.    Patient agrees with plan of care and understands instructions. Call if worsening symptoms or any problems or concerns.

## 2023-09-20 ENCOUNTER — HOSPITAL ENCOUNTER (EMERGENCY)
Facility: HOSPITAL | Age: 73
Discharge: HOME | End: 2023-09-20
Attending: EMERGENCY MEDICINE
Payer: MEDICARE

## 2023-09-20 ENCOUNTER — APPOINTMENT (OUTPATIENT)
Dept: GENERAL RADIOLOGY | Facility: HOSPITAL | Age: 73
End: 2023-09-20
Payer: MEDICARE

## 2023-09-20 VITALS
TEMPERATURE: 97 F | HEART RATE: 60 BPM | RESPIRATION RATE: 18 BRPM | WEIGHT: 200 LBS | SYSTOLIC BLOOD PRESSURE: 133 MMHG | HEIGHT: 72 IN | DIASTOLIC BLOOD PRESSURE: 98 MMHG | BODY MASS INDEX: 27.09 KG/M2 | OXYGEN SATURATION: 99 %

## 2023-09-20 DIAGNOSIS — R07.89 ANTERIOR CHEST WALL PAIN: ICD-10-CM

## 2023-09-20 DIAGNOSIS — R06.00 DYSPNEA, UNSPECIFIED TYPE: Primary | ICD-10-CM

## 2023-09-20 DIAGNOSIS — R03.0 ELEVATED BLOOD PRESSURE READING: ICD-10-CM

## 2023-09-20 LAB
ALBUMIN SERPL-MCNC: 4.7 G/DL (ref 3.5–5.2)
ALBUMIN/GLOB SERPL: 1.5 G/DL
ALP SERPL-CCNC: 82 U/L (ref 39–117)
ALT SERPL W P-5'-P-CCNC: 27 U/L (ref 1–41)
ANION GAP SERPL CALCULATED.3IONS-SCNC: 14.3 MMOL/L (ref 5–15)
AST SERPL-CCNC: 26 U/L (ref 1–40)
B PARAPERT DNA SPEC QL NAA+PROBE: NOT DETECTED
B PERT DNA SPEC QL NAA+PROBE: NOT DETECTED
BASOPHILS # BLD AUTO: 0.04 10*3/MM3 (ref 0–0.2)
BASOPHILS NFR BLD AUTO: 0.5 % (ref 0–1.5)
BILIRUB SERPL-MCNC: 1.2 MG/DL (ref 0–1.2)
BUN SERPL-MCNC: 18 MG/DL (ref 8–23)
BUN/CREAT SERPL: 17.5 (ref 7–25)
C PNEUM DNA NPH QL NAA+NON-PROBE: NOT DETECTED
CALCIUM SPEC-SCNC: 9.8 MG/DL (ref 8.6–10.5)
CHLORIDE SERPL-SCNC: 103 MMOL/L (ref 98–107)
CO2 SERPL-SCNC: 22.7 MMOL/L (ref 22–29)
CREAT SERPL-MCNC: 1.03 MG/DL (ref 0.76–1.27)
DEPRECATED RDW RBC AUTO: 41.1 FL (ref 37–54)
EGFRCR SERPLBLD CKD-EPI 2021: 77.2 ML/MIN/1.73
EOSINOPHIL # BLD AUTO: 0.07 10*3/MM3 (ref 0–0.4)
EOSINOPHIL NFR BLD AUTO: 0.9 % (ref 0.3–6.2)
ERYTHROCYTE [DISTWIDTH] IN BLOOD BY AUTOMATED COUNT: 12.3 % (ref 12.3–15.4)
FLUAV SUBTYP SPEC NAA+PROBE: NOT DETECTED
FLUBV RNA ISLT QL NAA+PROBE: NOT DETECTED
GEN 5 2HR TROPONIN T REFLEX: 15 NG/L
GLOBULIN UR ELPH-MCNC: 3.1 GM/DL
GLUCOSE SERPL-MCNC: 89 MG/DL (ref 65–99)
HADV DNA SPEC NAA+PROBE: NOT DETECTED
HCOV 229E RNA SPEC QL NAA+PROBE: NOT DETECTED
HCOV HKU1 RNA SPEC QL NAA+PROBE: NOT DETECTED
HCOV NL63 RNA SPEC QL NAA+PROBE: NOT DETECTED
HCOV OC43 RNA SPEC QL NAA+PROBE: NOT DETECTED
HCT VFR BLD AUTO: 43.2 % (ref 37.5–51)
HGB BLD-MCNC: 15 G/DL (ref 13–17.7)
HMPV RNA NPH QL NAA+NON-PROBE: NOT DETECTED
HPIV1 RNA ISLT QL NAA+PROBE: NOT DETECTED
HPIV2 RNA SPEC QL NAA+PROBE: NOT DETECTED
HPIV3 RNA NPH QL NAA+PROBE: NOT DETECTED
HPIV4 P GENE NPH QL NAA+PROBE: NOT DETECTED
IMM GRANULOCYTES # BLD AUTO: 0.01 10*3/MM3 (ref 0–0.05)
IMM GRANULOCYTES NFR BLD AUTO: 0.1 % (ref 0–0.5)
LYMPHOCYTES # BLD AUTO: 2.9 10*3/MM3 (ref 0.7–3.1)
LYMPHOCYTES NFR BLD AUTO: 35.8 % (ref 19.6–45.3)
M PNEUMO IGG SER IA-ACNC: NOT DETECTED
MCH RBC QN AUTO: 31.9 PG (ref 26.6–33)
MCHC RBC AUTO-ENTMCNC: 34.7 G/DL (ref 31.5–35.7)
MCV RBC AUTO: 91.9 FL (ref 79–97)
MONOCYTES # BLD AUTO: 0.69 10*3/MM3 (ref 0.1–0.9)
MONOCYTES NFR BLD AUTO: 8.5 % (ref 5–12)
NEUTROPHILS NFR BLD AUTO: 4.38 10*3/MM3 (ref 1.7–7)
NEUTROPHILS NFR BLD AUTO: 54.2 % (ref 42.7–76)
NRBC BLD AUTO-RTO: 0 /100 WBC (ref 0–0.2)
NT-PROBNP SERPL-MCNC: 85.4 PG/ML (ref 0–900)
PLATELET # BLD AUTO: 249 10*3/MM3 (ref 140–450)
PMV BLD AUTO: 10.3 FL (ref 6–12)
POTASSIUM SERPL-SCNC: 3.6 MMOL/L (ref 3.5–5.2)
PROT SERPL-MCNC: 7.8 G/DL (ref 6–8.5)
RBC # BLD AUTO: 4.7 10*6/MM3 (ref 4.14–5.8)
RHINOVIRUS RNA SPEC NAA+PROBE: NOT DETECTED
RSV RNA NPH QL NAA+NON-PROBE: NOT DETECTED
SARS-COV-2 RNA NPH QL NAA+NON-PROBE: NOT DETECTED
SODIUM SERPL-SCNC: 140 MMOL/L (ref 136–145)
TROPONIN T DELTA: 4 NG/L
TROPONIN T SERPL HS-MCNC: 11 NG/L
WBC NRBC COR # BLD: 8.09 10*3/MM3 (ref 3.4–10.8)

## 2023-09-20 PROCEDURE — 85025 COMPLETE CBC W/AUTO DIFF WBC: CPT | Performed by: EMERGENCY MEDICINE

## 2023-09-20 PROCEDURE — 36415 COLL VENOUS BLD VENIPUNCTURE: CPT

## 2023-09-20 PROCEDURE — 80053 COMPREHEN METABOLIC PANEL: CPT | Performed by: EMERGENCY MEDICINE

## 2023-09-20 PROCEDURE — 93005 ELECTROCARDIOGRAM TRACING: CPT | Performed by: EMERGENCY MEDICINE

## 2023-09-20 PROCEDURE — 93010 ELECTROCARDIOGRAM REPORT: CPT | Performed by: INTERNAL MEDICINE

## 2023-09-20 PROCEDURE — 71045 X-RAY EXAM CHEST 1 VIEW: CPT

## 2023-09-20 PROCEDURE — 99284 EMERGENCY DEPT VISIT MOD MDM: CPT

## 2023-09-20 PROCEDURE — 84484 ASSAY OF TROPONIN QUANT: CPT | Performed by: EMERGENCY MEDICINE

## 2023-09-20 PROCEDURE — 83880 ASSAY OF NATRIURETIC PEPTIDE: CPT | Performed by: EMERGENCY MEDICINE

## 2023-09-20 PROCEDURE — 63710000001 PREDNISONE PER 1 MG: Performed by: EMERGENCY MEDICINE

## 2023-09-20 PROCEDURE — 0202U NFCT DS 22 TRGT SARS-COV-2: CPT | Performed by: EMERGENCY MEDICINE

## 2023-09-20 RX ORDER — PREDNISONE 20 MG/1
TABLET ORAL
Qty: 10 TABLET | Refills: 0 | Status: SHIPPED | OUTPATIENT
Start: 2023-09-20

## 2023-09-20 RX ORDER — PREDNISONE 20 MG/1
40 TABLET ORAL ONCE
Status: COMPLETED | OUTPATIENT
Start: 2023-09-20 | End: 2023-09-20

## 2023-09-20 RX ORDER — SODIUM CHLORIDE 0.9 % (FLUSH) 0.9 %
10 SYRINGE (ML) INJECTION AS NEEDED
Status: DISCONTINUED | OUTPATIENT
Start: 2023-09-20 | End: 2023-09-21 | Stop reason: HOSPADM

## 2023-09-20 RX ORDER — ASPIRIN 81 MG/1
324 TABLET, CHEWABLE ORAL ONCE
Status: COMPLETED | OUTPATIENT
Start: 2023-09-20 | End: 2023-09-20

## 2023-09-20 RX ADMIN — PREDNISONE 40 MG: 20 TABLET ORAL at 21:22

## 2023-09-20 RX ADMIN — ASPIRIN 324 MG: 81 TABLET, CHEWABLE ORAL at 19:40

## 2023-09-20 NOTE — ED PROVIDER NOTES
EMERGENCY DEPARTMENT ENCOUNTER    Room Number:  36/36  Date of encounter:  9/20/2023  PCP: Smith Cabello MD  Historian: Patient    Patient was placed in face mask during triage process. Patient was wearing facemask when I entered the room and throughout our encounter. I wore full protective equipment throughout this patient encounter including a face mask, eye protection, and gloves. Hand hygiene was performed before donning protective equipment and again following doffing of PPE after leaving the room.    HPI:  Chief Complaint: Dyspnea  A complete HPI/ROS/PMH/PSH/SH/FH are unobtainable due to: N/A   Context: Jose Luis Davis is a 73 y.o. male who presents to the ED c/o both exertional and resting dyspnea developing over 1-1/2 weeks.  No cough, fever or hemoptysis reported.  Patient notes that it does get a little worse with exertion.  He also has been experiencing some mild tightness in the epigastrium and left lower chest with no radiation that comes and goes as well.  No syncope or near syncope.  No nausea vomiting diarrhea, black or bloody stools reported.  This is new to him with no history of smoking or prior cardiac history.      MEDICAL HISTORY REVIEW  Additional sources:  - Discussed/ obtained information from independent historians: Sister who is at bedside    - External (non-ED) record review:   Hospital discharge summary 8/9/2023 reviewed: Patient admitted for vertigo.  MRI brain imaging negative for acute infarct.  Headache improved with migraine cocktail.  Positive left Sallie-Hallpike corrected with Epley maneuver.  Patient referred for outpatient follow-up.    - Chronic or social conditions impacting care:       PAST MEDICAL HISTORY  Active Ambulatory Problems     Diagnosis Date Noted    Ureterolithiasis 03/10/2016    Benign nodular prostatic hyperplasia 04/28/2017    Essential hypertension 04/28/2017    Dyslipidemia 04/28/2017    Osteoarthritis of multiple joints 04/28/2017    Tension type  headache 06/20/2018    Migraine without aura 12/06/2018    Dizziness 04/08/2023    Benign paroxysmal positional vertigo 04/13/2023     Resolved Ambulatory Problems     Diagnosis Date Noted    No Resolved Ambulatory Problems     Past Medical History:   Diagnosis Date    Kidney stone          PAST SURGICAL HISTORY  Past Surgical History:   Procedure Laterality Date    CYSTOSCOPY URETEROSCOPY LASER LITHOTRIPSY Left 3/12/2016    Procedure: Cyto, Trans urethral left ureterral orifice incision, left ureteroscopy, holmium laser lithotripsy, basket extraction of stone fragment, placement of left double J ureteral stent. ;  Surgeon: Nathan Freeman Jr., MD;  Location: [facility];  Service:          FAMILY HISTORY  History reviewed. No pertinent family history.      SOCIAL HISTORY  Social History     Socioeconomic History    Marital status:    Tobacco Use    Smoking status: Former    Tobacco comments:     quit about 30 yrs ago   Vaping Use    Vaping Use: Never used   Substance and Sexual Activity    Alcohol use: Yes     Alcohol/week: 6.0 standard drinks     Types: 6 Cans of beer per week    Drug use: No    Sexual activity: Defer         ALLERGIES  Patient has no known allergies.        REVIEW OF SYSTEMS  Review of Systems     All systems reviewed and negative except for those discussed in HPI.       PHYSICAL EXAM    I have reviewed the triage vital signs and nursing notes.    ED Triage Vitals   Temp Heart Rate Resp BP SpO2   09/20/23 1909 09/20/23 1909 09/20/23 1909 09/20/23 1912 09/20/23 1909   97 °F (36.1 °C) 91 20 162/87 96 %      Temp src Heart Rate Source Patient Position BP Location FiO2 (%)   -- -- -- -- --              Physical Exam    Physical Exam   Constitutional: No distress.   HENT:  Head: Normocephalic and atraumatic.   Oropharynx: Mucous membranes are moist.   Eyes: No scleral icterus.   Neck: Neck supple.   Cardiovascular: Pink, warm and well-perfused throughout  Pulmonary/Chest: No respiratory  distress.  No tachypnea or increased work of breathing  Abdominal: Soft.  No rebound or rigidity  Musculoskeletal: Moves all extremities equally.   Neurological: Alert.  Speech fluent and easily intelligible  Skin: Skin is pink, warm, and dry. No pallor.   Psychiatric: Mood and affect normal.   Nursing note and vitals reviewed.    LAB RESULTS  Recent Results (from the past 24 hour(s))   Comprehensive Metabolic Panel    Collection Time: 09/20/23  7:20 PM    Specimen: Blood   Result Value Ref Range    Glucose 89 65 - 99 mg/dL    BUN 18 8 - 23 mg/dL    Creatinine 1.03 0.76 - 1.27 mg/dL    Sodium 140 136 - 145 mmol/L    Potassium 3.6 3.5 - 5.2 mmol/L    Chloride 103 98 - 107 mmol/L    CO2 22.7 22.0 - 29.0 mmol/L    Calcium 9.8 8.6 - 10.5 mg/dL    Total Protein 7.8 6.0 - 8.5 g/dL    Albumin 4.7 3.5 - 5.2 g/dL    ALT (SGPT) 27 1 - 41 U/L    AST (SGOT) 26 1 - 40 U/L    Alkaline Phosphatase 82 39 - 117 U/L    Total Bilirubin 1.2 0.0 - 1.2 mg/dL    Globulin 3.1 gm/dL    A/G Ratio 1.5 g/dL    BUN/Creatinine Ratio 17.5 7.0 - 25.0    Anion Gap 14.3 5.0 - 15.0 mmol/L    eGFR 77.2 >60.0 mL/min/1.73   BNP    Collection Time: 09/20/23  7:20 PM    Specimen: Blood   Result Value Ref Range    proBNP 85.4 0.0 - 900.0 pg/mL   High Sensitivity Troponin T    Collection Time: 09/20/23  7:20 PM    Specimen: Blood   Result Value Ref Range    HS Troponin T 11 <15 ng/L   CBC Auto Differential    Collection Time: 09/20/23  7:20 PM    Specimen: Blood   Result Value Ref Range    WBC 8.09 3.40 - 10.80 10*3/mm3    RBC 4.70 4.14 - 5.80 10*6/mm3    Hemoglobin 15.0 13.0 - 17.7 g/dL    Hematocrit 43.2 37.5 - 51.0 %    MCV 91.9 79.0 - 97.0 fL    MCH 31.9 26.6 - 33.0 pg    MCHC 34.7 31.5 - 35.7 g/dL    RDW 12.3 12.3 - 15.4 %    RDW-SD 41.1 37.0 - 54.0 fl    MPV 10.3 6.0 - 12.0 fL    Platelets 249 140 - 450 10*3/mm3    Neutrophil % 54.2 42.7 - 76.0 %    Lymphocyte % 35.8 19.6 - 45.3 %    Monocyte % 8.5 5.0 - 12.0 %    Eosinophil % 0.9 0.3 - 6.2 %     Basophil % 0.5 0.0 - 1.5 %    Immature Grans % 0.1 0.0 - 0.5 %    Neutrophils, Absolute 4.38 1.70 - 7.00 10*3/mm3    Lymphocytes, Absolute 2.90 0.70 - 3.10 10*3/mm3    Monocytes, Absolute 0.69 0.10 - 0.90 10*3/mm3    Eosinophils, Absolute 0.07 0.00 - 0.40 10*3/mm3    Basophils, Absolute 0.04 0.00 - 0.20 10*3/mm3    Immature Grans, Absolute 0.01 0.00 - 0.05 10*3/mm3    nRBC 0.0 0.0 - 0.2 /100 WBC   ECG 12 Lead Dyspnea    Collection Time: 09/20/23  7:25 PM   Result Value Ref Range    QT Interval 426 ms    QTC Interval 463 ms   Respiratory Panel PCR w/COVID-19(SARS-CoV-2) PATRICIA/KEENAN/MELINA/PAD/COR/MAD/VALENTE In-House, NP Swab in UTM/VTM, 3-4 HR TAT - Swab, Nasopharynx    Collection Time: 09/20/23  7:26 PM    Specimen: Nasopharynx; Swab   Result Value Ref Range    ADENOVIRUS, PCR Not Detected Not Detected    Coronavirus 229E Not Detected Not Detected    Coronavirus HKU1 Not Detected Not Detected    Coronavirus NL63 Not Detected Not Detected    Coronavirus OC43 Not Detected Not Detected    COVID19 Not Detected Not Detected - Ref. Range    Human Metapneumovirus Not Detected Not Detected    Human Rhinovirus/Enterovirus Not Detected Not Detected    Influenza A PCR Not Detected Not Detected    Influenza B PCR Not Detected Not Detected    Parainfluenza Virus 1 Not Detected Not Detected    Parainfluenza Virus 2 Not Detected Not Detected    Parainfluenza Virus 3 Not Detected Not Detected    Parainfluenza Virus 4 Not Detected Not Detected    RSV, PCR Not Detected Not Detected    Bordetella pertussis pcr Not Detected Not Detected    Bordetella parapertussis PCR Not Detected Not Detected    Chlamydophila pneumoniae PCR Not Detected Not Detected    Mycoplasma pneumo by PCR Not Detected Not Detected   High Sensitivity Troponin T 2Hr    Collection Time: 09/20/23  9:22 PM    Specimen: Blood   Result Value Ref Range    HS Troponin T 15 (H) <15 ng/L    Troponin T Delta 4 (C) >=-4 - <+4 ng/L       Ordered the above labs and independently reviewed  the results.        RADIOLOGY  XR Chest 1 View    Result Date: 9/20/2023  XR CHEST 1 VW-  HISTORY: Male who is 73 years-old, cough, dyspnea  TECHNIQUE: Frontal view of the chest  COMPARISON: None available  FINDINGS: The heart size is normal. Aorta is tortuous. Pulmonary vasculature is unremarkable. No focal pulmonary consolidation, pleural effusion, or pneumothorax. No acute osseous process.      No focal pulmonary consolidation. Follow-up as clinical indications persist.  This report was finalized on 9/20/2023 7:46 PM by Dr. Codey Jolley M.D.       I ordered the above noted radiological studies. Reviewed by me and discussed with radiologist.  See dictation for official radiology interpretation.      PROCEDURES    Procedures        MEDICATIONS GIVEN IN ER    Medications   sodium chloride 0.9 % flush 10 mL (has no administration in time range)   aspirin chewable tablet 324 mg (324 mg Oral Given 9/20/23 1940)   predniSONE (DELTASONE) tablet 40 mg (40 mg Oral Given 9/20/23 2122)         PROGRESS, DATA ANALYSIS, CONSULTS, AND MEDICAL DECISION MAKING    My differential diagnosis for dyspnea includes but is not limited to:  Asthma, COPD, pneumonia, pulmonary embolus, acute respiratory distress syndrome, pneumothorax, pleural effusion, pulmonary fibrosis, congestive heart failure, myocardial infarction, DKA, uremia, acidosis, sepsis, anemia, drug related, hyperventilation, CNS disease      All labs have been independently reviewed by me.  All radiology studies have been reviewed by me and discussed with radiologist dictating the report.   EKG's independently viewed and interpreted by me.  Discussion below represents my analysis of pertinent findings related to patient's condition, differential diagnosis, treatment plan and final disposition.      ED Course as of 09/20/23 2207   Wed Sep 20, 2023   1936 EKG           EKG time: 1925  Rhythm/Rate: Sinus, 70  P waves and OH: LAKHWINDER within normal limits  QRS, axis: Narrow  complex  ST and T waves: No STEMI    Interpreted Contemporaneously by me, independently viewed  Comparison: None available   [RS]   2030 RADIOLOGY      Study: Single view chest  Findings: No pneumothorax or focal infiltrate appreciated  I independently viewed and interpreted these images contemporaneously with treatment.    [RS]   2030 COVID19: Not Detected [RS]   2030 Influenza A PCR: Not Detected [RS]   2030 Influenza B PCR: Not Detected [RS]   2030 HS Troponin T: 11 [RS]   2030 Glucose: 89 [RS]   2030 BUN: 18 [RS]   2030 Creatinine: 1.03 [RS]   2030 Sodium: 140 [RS]   2030 Potassium: 3.6 [RS]   2030 proBNP: 85.4 [RS]   2030 WBC: 8.09 [RS]   2030 Hemoglobin: 15.0 [RS]   2059 Reviewed all findings with patient and family who are at bedside.  He still occasionally feels a catch in the left side of his chest with respirations.  I have recommended a repeat troponin to ensure no delta but feel pretty confident at this time that his symptoms are likely noncardiac in nature.  To that end, we will try a dose of prednisone for possible musculoskeletal/pleural-based discomfort. [RS]   2206 Troponin T Delta(!!): 4  Negative delta troponin.  Discharge as previously discussed.  Patient agreeable. [RS]      ED Course User Index  [RS] Mahamed Mojica MD       AS OF 22:07 EDT VITALS:    BP - 133/98  HR - 55  TEMP - 97 °F (36.1 °C)  O2 SATS - 97%        DIAGNOSIS  Final diagnoses:   Dyspnea, unspecified type   Anterior chest wall pain   Elevated blood pressure reading         DISPOSITION  DISCHARGE    Patient discharged in stable condition.    Reviewed implications of results, diagnosis, meds, responsibility to follow up, warning signs and symptoms of possible worsening, potential complications and reasons to return to ER.    Patient/Family voiced understanding of above instructions.    Discussed plan for discharge, as there is no emergent indication for admission. Patient referred to primary care provider for regular health  maintenance. Pt/family is agreeable and understands need for follow up and possible repeat testing.  Pt is aware that discharge does not mean that nothing is wrong but it indicates no emergency is present that requires admission and they must continue care with follow-up as given below or physician of their choice.     FOLLOW-UP  Smith Cabello MD  [address] [facility] [id number] [phone]    Call in 1 day  Schedule close outpatient follow-up for further evaluation and treatment of symptoms    [facility] CARD[facility]  [address]  [address]  [facility] [address] [phone]  Call in 2 days  Schedule outpatient follow-up         Medication List        New Prescriptions      predniSONE 20 MG tablet  Commonly known as: DELTASONE  2 tablets by mouth daily for 5 days               Where to Get Your Medications        These medications were sent to [facility] [id number] - [place], [place] - [address]  [facility] [address] [facility] [address] - [phone] [place] - [phone] [facility] [facility] [address]      Phone: [phone]   predniSONE 20 MG tablet           Please note that portions of this were completed with a voice recognition program.       Note Disclaimer: At [facility], we believe that sharing information builds trust and better relationships. You are receiving this note because you are receiving care at [facility] or recently visited. It is possible you will see health information before a provider has talked with you about it. This kind of information can be easy to misunderstand. To help you fully understand what it means for your health, we urge you to discuss this note with your provider.     Mahamed Mojica MD  09/20/23 3143

## 2023-09-20 NOTE — ED NOTES
Patient presents for SOA x1.5 weeks. Reports chest heaviness. Intermittent cough which is productive. SOA with any exertion. NAD at rest. Denies fever, NVD    Reports was recently at a large gathering and is concerned for respiratory infection

## 2023-09-22 LAB
QT INTERVAL: 426 MS
QTC INTERVAL: 463 MS

## 2023-10-03 ENCOUNTER — OFFICE VISIT (OUTPATIENT)
Dept: FAMILY MEDICINE CLINIC | Facility: CLINIC | Age: 73
End: 2023-10-03
Payer: MEDICARE

## 2023-10-03 VITALS
DIASTOLIC BLOOD PRESSURE: 86 MMHG | TEMPERATURE: 98.7 F | SYSTOLIC BLOOD PRESSURE: 142 MMHG | BODY MASS INDEX: 27.2 KG/M2 | WEIGHT: 200.8 LBS | OXYGEN SATURATION: 99 % | HEART RATE: 75 BPM | HEIGHT: 72 IN

## 2023-10-03 DIAGNOSIS — Z87.891 HISTORY OF TOBACCO ABUSE: ICD-10-CM

## 2023-10-03 DIAGNOSIS — R06.00 DYSPNEA, UNSPECIFIED TYPE: ICD-10-CM

## 2023-10-03 DIAGNOSIS — Z23 IMMUNIZATION DUE: ICD-10-CM

## 2023-10-03 DIAGNOSIS — Z76.89 ENCOUNTER TO ESTABLISH CARE: ICD-10-CM

## 2023-10-03 DIAGNOSIS — R97.20 ELEVATED PSA: ICD-10-CM

## 2023-10-03 DIAGNOSIS — I10 ESSENTIAL HYPERTENSION: Primary | ICD-10-CM

## 2023-10-03 DIAGNOSIS — N40.0 BENIGN NODULAR PROSTATIC HYPERPLASIA: ICD-10-CM

## 2023-10-03 DIAGNOSIS — E78.5 DYSLIPIDEMIA: ICD-10-CM

## 2023-10-03 LAB — PSA SERPL-MCNC: 4.74 NG/ML (ref 0–4)

## 2023-10-03 PROCEDURE — G0008 ADMIN INFLUENZA VIRUS VAC: HCPCS | Performed by: NURSE PRACTITIONER

## 2023-10-03 PROCEDURE — 3079F DIAST BP 80-89 MM HG: CPT | Performed by: NURSE PRACTITIONER

## 2023-10-03 PROCEDURE — 90677 PCV20 VACCINE IM: CPT | Performed by: NURSE PRACTITIONER

## 2023-10-03 PROCEDURE — 99214 OFFICE O/P EST MOD 30 MIN: CPT | Performed by: NURSE PRACTITIONER

## 2023-10-03 PROCEDURE — 1159F MED LIST DOCD IN RCRD: CPT | Performed by: NURSE PRACTITIONER

## 2023-10-03 PROCEDURE — 3077F SYST BP >= 140 MM HG: CPT | Performed by: NURSE PRACTITIONER

## 2023-10-03 PROCEDURE — 90662 IIV NO PRSV INCREASED AG IM: CPT | Performed by: NURSE PRACTITIONER

## 2023-10-03 PROCEDURE — 1160F RVW MEDS BY RX/DR IN RCRD: CPT | Performed by: NURSE PRACTITIONER

## 2023-10-03 NOTE — PROGRESS NOTES
"Chief Complaint  Hospital Follow Up Visit (ED 9/20/Shortness of breath/Seeing Cardiologist 10/19/) and Immunizations (Due Flu and pneumonia/)    Subjective        Jose Luis Davis presents to [facility]  History of Present Illness  Patient presents to the office today for a follow-up from ER visit.  He was seen in the ER on September 20, 2023.  He presented with exertional and resting dyspnea that was occurring for 1 to 1-1/2 weeks.  He is denying active cough or fever or chills.  Chest x-ray reviewed was normal.  After discussion patient is a greater than 20 pack/year smoker.  I would like to further evaluate with a low-dose CT screening test.  He has an appointment with cardiologist on October 19, 2023.  He denies chest pain shortness of air today.  With BPH he was following [facility].  Last office visit was in 2022.  Last PSA level was elevated I will like to further evaluate with a diagnostic PSA level today.  Blood pressure today is 142/86.  With hyperlipidemia he is taking Crestor 40 mg.  He takes over-the-counter vitamins denies any additional symptoms.  He brought in blood pressure readings with all normal ranges.      Objective   Vital Signs:  /86 (BP Location: Left arm, Patient Position: Sitting, Cuff Size: Adult)   Pulse 75   Temp 98.7 °F (37.1 °C)   Ht 182.9 cm (72.01\")   Wt 91.1 kg (200 lb 12.8 oz)   SpO2 99%   BMI 27.23 kg/m²   Estimated body mass index is 27.23 kg/m² as calculated from the following:    Height as of this encounter: 182.9 cm (72.01\").    Weight as of this encounter: 91.1 kg (200 lb 12.8 oz).               Physical Exam  Constitutional:       General: He is not in acute distress.     Appearance: Normal appearance.   HENT:      Head: Normocephalic.   Eyes:      Pupils: Pupils are equal, round, and reactive to light.   Cardiovascular:      Rate and Rhythm: Normal rate.      Pulses: Normal pulses.      Heart sounds: Normal heart sounds. "   Pulmonary:      Effort: Pulmonary effort is normal.      Breath sounds: Normal breath sounds.   Musculoskeletal:         General: Normal range of motion.      Cervical back: Normal range of motion and neck supple.   Skin:     General: Skin is warm.   Neurological:      General: No focal deficit present.      Mental Status: He is alert and oriented to person, place, and time.   Psychiatric:         Mood and Affect: Mood normal.         Behavior: Behavior normal.         Thought Content: Thought content normal.         Judgment: Judgment normal.      Result Review :  The following data was reviewed by: NICHOLAS Palafox on 10/03/2023:  Common labs          4/9/2023    04:03 9/20/2023    19:20 9/29/2023    08:00   Common Labs   Glucose 88  89  101    BUN 15  18  21    Creatinine 1.02  1.03  0.95    Sodium 139  140  141    Potassium 4.0  3.6  4.1    Chloride 108  103  105    Calcium 9.3  9.8  9.9    Total Protein   7.1    Albumin  4.7  4.6    Total Bilirubin  1.2  1.5    Alkaline Phosphatase  82  79    AST (SGOT)  26  17    ALT (SGPT)  27  30    WBC 6.83  8.09  7.79    Hemoglobin 14.2  15.0  15.4    Hematocrit 40.4  43.2  44.9    Platelets 220  249  241    Total Cholesterol   108    Triglycerides   123    HDL Cholesterol   39    LDL Cholesterol    47      Data reviewed : Radiologic studies chest xray  and Cardiology studies EKG normal              Assessment and Plan   Diagnoses and all orders for this visit:    1. Essential hypertension (Primary)    2. Dyslipidemia    3. History of tobacco abuse  -     CT Chest Low Dose Wo; Future    4. Benign nodular prostatic hyperplasia    5. Elevated PSA  -     PSA DIAGNOSTIC ONLY; Future    6. Dyspnea, unspecified type  -     CT Chest Low Dose Wo; Future    7. Immunization due  -     Fluzone High-Dose 65+yrs (0318-7780)  -     Pneumococcal Conjugate Vaccine 20-Valent (PCV20)    8. Encounter to establish care      Hypertension continue to work on healthy diet and exercise  including DASH diet blood pressure is normal.  Diet and healthy lifestyle controlled without medication.    Dyslipidemia continue to work on healthy diet and exercise, reviewed lipid panel all normal.  Taking Crestor 40 mg.  BPH check PSA diagnostic today.    Dyspnea related to history of 20 pack/year history of smoking check a CT low-dose.           I spent 30 minutes caring for Jose Luis on this date of service. This time includes time spent by me in the following activities:preparing for the visit, reviewing tests, obtaining and/or reviewing a separately obtained history, performing a medically appropriate examination and/or evaluation , counseling and educating the patient/family/caregiver, ordering medications, tests, or procedures, referring and communicating with other health care professionals , documenting information in the medical record, independently interpreting results and communicating that information with the patient/family/caregiver, and care coordination  Follow Up   Return in about 6 months (around 4/12/2024) for Medicare Wellness.  Patient was given instructions and counseling regarding his condition or for health maintenance advice. Please see specific information pulled into the AVS if appropriate.

## 2023-10-18 NOTE — PROGRESS NOTES
Subjective:     Encounter Date:10/19/2023      Patient ID: Jose Luis Davis is a 73 y.o. male.    Chief Complaint:  History of Present Illness    This is a 73-year-old with hypertension, hyperlipidemia, tobacco use, who presents for an evaluation of shortness of breath.    He was actually evaluated in the emergency room in 9/2023 with complaints of exertional and resting dyspnea that started about 1 to 2 weeks prior.  His work-up in the emergency room was unremarkable.  He was sent home with a prednisone course.  He was also referred to our office for further evaluation.  He recently followed up with NICHLOAS Palafox earlier this month.  His blood pressures appear to be normal at that time.  He was compliant with his rosuvastatin.  Due to his history of smoking and symptoms of dyspnea a low-dose CT scan was recommended.  He was also encouraged to keep his appointment with us.    The patient reports that he is continue to have symptoms since his ER visit in September.  He describes the episodes as a feeling like he has to take a deep breath but he cannot take a full breath.  Symptoms can occur 2 or 3 times in a row and are very brief.  Symptoms appear to be more noticeable at rest.  He does not really notice much in the way of dyspnea on exertion except he did feel like he was getting little bit more out of breath than normal when he was doing some housework earlier this week.  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  He does report some chest discomfort that he just describes as a strange feeling in his chest that again mainly is noticeable at rest and not necessarily with activity.  He denies any palpitations, lightheadedness, dizziness, or lower extremity swelling.  He does feel like overall that his symptoms are little bit better than they were before however he remains very concerned about the symptoms and feels like there is definitely  something wrong.    He brings in a list of his blood pressure readings and it shows that his systolics have remained in the 120s and diastolics in the 70s to low 80s.  He has been on rosuvastatin for a few years now.  He does report a family history of CABG in his father in his 50s.  It does not appear that he has had any cardiac work-up previously.    Review of Systems   Constitutional: Negative for malaise/fatigue.   HENT:  Negative for hearing loss, hoarse voice, nosebleeds and sore throat.    Eyes:  Negative for pain.   Cardiovascular:  Positive for chest pain. Negative for claudication, cyanosis, dyspnea on exertion, irregular heartbeat, leg swelling, near-syncope, orthopnea, palpitations, paroxysmal nocturnal dyspnea and syncope.   Respiratory:  Positive for shortness of breath. Negative for snoring.    Endocrine: Negative for cold intolerance, heat intolerance, polydipsia, polyphagia and polyuria.   Skin:  Negative for itching and rash.   Musculoskeletal:  Negative for arthritis, falls, joint pain, joint swelling, muscle cramps, muscle weakness and myalgias.   Gastrointestinal:  Negative for constipation, diarrhea, dysphagia, heartburn, hematemesis, hematochezia, melena, nausea and vomiting.   Genitourinary:  Negative for frequency, hematuria and hesitancy.   Neurological:  Negative for excessive daytime sleepiness, dizziness, headaches, light-headedness, numbness and weakness.   Psychiatric/Behavioral:  Negative for depression. The patient is not nervous/anxious.          Current Outpatient Medications:     APPLE CIDER VINEGAR PO, Take  by mouth Daily. Liquid blend, Disp: , Rfl:     Coenzyme Q10 (CO Q 10 PO), Take  by mouth 2 (Two) Times a Day., Disp: , Rfl:     magnesium gluconate (MAGONATE) 500 MG tablet, Take 1 tablet by mouth 2 (Two) Times a Day., Disp: 90 tablet, Rfl: 1    Multiple Vitamins-Minerals (MULTIVITAMIN ADULT PO), Take 1 tablet/day by mouth., Disp: , Rfl:     rosuvastatin (CRESTOR) 40 MG  "tablet, Take 1 tablet by mouth Daily., Disp: 90 tablet, Rfl: 2    Saw Palmetto 450 MG capsule, Take  by mouth 2 (Two) Times a Day., Disp: , Rfl:     Past Medical History:   Diagnosis Date    Essential hypertension 4/28/2017    Kidney stone     Osteoarthritis of multiple joints 4/28/2017       Past Surgical History:   Procedure Laterality Date    CYSTOSCOPY URETEROSCOPY LASER LITHOTRIPSY Left 3/12/2016    Procedure: Cyto, Trans urethral left ureterral orifice incision, left ureteroscopy, holmium laser lithotripsy, basket extraction of stone fragment, placement of left double J ureteral stent. ;  Surgeon: Nathan Freeman Jr., MD;  Location: [facility];  Service:        History reviewed. No pertinent family history.    Social History     Tobacco Use    Smoking status: Former    Tobacco comments:     quit about 30 yrs ago   Vaping Use    Vaping Use: Never used   Substance Use Topics    Alcohol use: Yes     Alcohol/week: 6.0 standard drinks of alcohol     Types: 6 Cans of beer per week    Drug use: No         ECG 12 Lead    Date/Time: 10/19/2023 10:24 AM  Performed by: Melanie Castro MD    Authorized by: Melanie Castro MD  Comparison: compared with previous ECG   Similar to previous ECG  Rhythm: sinus rhythm             Objective:     Visit Vitals  /90 (BP Location: Left arm, Patient Position: Sitting, Cuff Size: Adult)   Pulse 75   Ht 182.9 cm (72.01\")   Wt 91.9 kg (202 lb 9.6 oz)   SpO2 98%   BMI 27.47 kg/m²         Constitutional:       Appearance: Normal appearance. Well-developed.   HENT:      Head: Normocephalic and atraumatic.   Neck:      Vascular: No carotid bruit or JVD.   Pulmonary:      Effort: Pulmonary effort is normal.      Breath sounds: Normal breath sounds.   Cardiovascular:      Normal rate. Regular rhythm.      No gallop.    Pulses:     Radial: 2+ bilaterally.  Edema:     Peripheral edema absent.   Abdominal:      Palpations: Abdomen is soft.   Skin:     General: Skin is warm and dry. "   Neurological:      Mental Status: Alert and oriented to person, place, and time.             Assessment:          Diagnosis Plan   1. Chest pain, atypical        2. Shortness of breath        3. Essential hypertension        4. Dyslipidemia               Plan:       1.  Shortness of breath/chest discomfort.  His symptoms sound atypical for a cardiac issue and that they do not occur necessarily with exertion but mainly at rest and are quite brief.  His chest discomfort also sounds atypical and does not occur with exertion.  He again describes it more as a sensation like he cannot take a full breath.  Only possible cardiac explanation I can think of is if he is having an ectopic beat either premature atrial or premature ventricular contraction which often can cause the symptoms.  However he does not have any evidence of that on any of his EKGs were telemetry that I have been able to observe on chart review.  However to rule this out further recommend proceeding with a 24-hour Holter monitor.  Although my suspicion is low for ischemic heart disease or other structural heart disease he does have significant risk factors including hyperlipidemia and family history of premature coronary artery disease so I recommend ruling this out further with a treadmill stress test and an echocardiogram.  2.  Hyperlipidemia.  On rosuvastatin.    I will call and discuss results of the Holter monitor, treadmill stress test, and echocardiogram and determine further recommendations based on those results.

## 2023-10-19 ENCOUNTER — OFFICE VISIT (OUTPATIENT)
Age: 73
End: 2023-10-19
Payer: MEDICARE

## 2023-10-19 VITALS
HEIGHT: 72 IN | WEIGHT: 202.6 LBS | OXYGEN SATURATION: 98 % | BODY MASS INDEX: 27.44 KG/M2 | HEART RATE: 75 BPM | SYSTOLIC BLOOD PRESSURE: 138 MMHG | DIASTOLIC BLOOD PRESSURE: 90 MMHG

## 2023-10-19 DIAGNOSIS — E78.5 DYSLIPIDEMIA: ICD-10-CM

## 2023-10-19 DIAGNOSIS — R06.02 SHORTNESS OF BREATH: ICD-10-CM

## 2023-10-19 DIAGNOSIS — I10 ESSENTIAL HYPERTENSION: ICD-10-CM

## 2023-10-19 DIAGNOSIS — R07.89 CHEST PAIN, ATYPICAL: Primary | ICD-10-CM

## 2023-10-19 PROCEDURE — 93000 ELECTROCARDIOGRAM COMPLETE: CPT | Performed by: INTERNAL MEDICINE

## 2023-10-19 PROCEDURE — 1159F MED LIST DOCD IN RCRD: CPT | Performed by: INTERNAL MEDICINE

## 2023-10-19 PROCEDURE — 3080F DIAST BP >= 90 MM HG: CPT | Performed by: INTERNAL MEDICINE

## 2023-10-19 PROCEDURE — 1160F RVW MEDS BY RX/DR IN RCRD: CPT | Performed by: INTERNAL MEDICINE

## 2023-10-19 PROCEDURE — 99204 OFFICE O/P NEW MOD 45 MIN: CPT | Performed by: INTERNAL MEDICINE

## 2023-10-19 PROCEDURE — 3075F SYST BP GE 130 - 139MM HG: CPT | Performed by: INTERNAL MEDICINE

## 2023-10-31 ENCOUNTER — HOSPITAL ENCOUNTER (OUTPATIENT)
Dept: CARDIOLOGY | Facility: HOSPITAL | Age: 73
Discharge: HOME | End: 2023-10-31
Payer: MEDICARE

## 2023-10-31 VITALS
HEIGHT: 72 IN | SYSTOLIC BLOOD PRESSURE: 128 MMHG | WEIGHT: 202 LBS | BODY MASS INDEX: 27.36 KG/M2 | DIASTOLIC BLOOD PRESSURE: 78 MMHG | HEART RATE: 75 BPM

## 2023-10-31 DIAGNOSIS — R06.02 SHORTNESS OF BREATH: ICD-10-CM

## 2023-10-31 DIAGNOSIS — R07.89 CHEST PAIN, ATYPICAL: ICD-10-CM

## 2023-10-31 LAB
AORTIC ARCH: 2.5 CM
ASCENDING AORTA: 3.9 CM
BH CV ECHO MEAS - ACS: 2.38 CM
BH CV ECHO MEAS - AI P1/2T: 588.1 MSEC
BH CV ECHO MEAS - AO MAX PG: 6.8 MMHG
BH CV ECHO MEAS - AO MEAN PG: 4 MMHG
BH CV ECHO MEAS - AO ROOT DIAM: 4 CM
BH CV ECHO MEAS - AO V2 MAX: 130 CM/SEC
BH CV ECHO MEAS - AO V2 VTI: 28 CM
BH CV ECHO MEAS - AVA(I,D): 2.6 CM2
BH CV ECHO MEAS - EDV(CUBED): 103.8 ML
BH CV ECHO MEAS - EDV(MOD-SP2): 77 ML
BH CV ECHO MEAS - EDV(MOD-SP4): 115 ML
BH CV ECHO MEAS - EF(MOD-BP): 63.7 %
BH CV ECHO MEAS - EF(MOD-SP2): 59.7 %
BH CV ECHO MEAS - EF(MOD-SP4): 62.6 %
BH CV ECHO MEAS - ESV(CUBED): 36.7 ML
BH CV ECHO MEAS - ESV(MOD-SP2): 31 ML
BH CV ECHO MEAS - ESV(MOD-SP4): 43 ML
BH CV ECHO MEAS - FS: 29.3 %
BH CV ECHO MEAS - IVS/LVPW: 1 CM
BH CV ECHO MEAS - IVSD: 0.9 CM
BH CV ECHO MEAS - LAT PEAK E' VEL: 9.1 CM/SEC
BH CV ECHO MEAS - LV DIASTOLIC VOL/BSA (35-75): 53.8 CM2
BH CV ECHO MEAS - LV MASS(C)D: 142.7 GRAMS
BH CV ECHO MEAS - LV MAX PG: 5.6 MMHG
BH CV ECHO MEAS - LV MEAN PG: 3 MMHG
BH CV ECHO MEAS - LV SYSTOLIC VOL/BSA (12-30): 20.1 CM2
BH CV ECHO MEAS - LV V1 MAX: 118 CM/SEC
BH CV ECHO MEAS - LV V1 VTI: 23 CM
BH CV ECHO MEAS - LVIDD: 4.7 CM
BH CV ECHO MEAS - LVIDS: 3.3 CM
BH CV ECHO MEAS - LVOT AREA: 3.2 CM2
BH CV ECHO MEAS - LVOT DIAM: 2.02 CM
BH CV ECHO MEAS - LVPWD: 0.9 CM
BH CV ECHO MEAS - MED PEAK E' VEL: 10.6 CM/SEC
BH CV ECHO MEAS - MV A DUR: 0.1 SEC
BH CV ECHO MEAS - MV A MAX VEL: 85.6 CM/SEC
BH CV ECHO MEAS - MV DEC SLOPE: 339.1 CM/SEC2
BH CV ECHO MEAS - MV DEC TIME: 0.21 SEC
BH CV ECHO MEAS - MV E MAX VEL: 77.1 CM/SEC
BH CV ECHO MEAS - MV E/A: 0.9
BH CV ECHO MEAS - MV MAX PG: 3.2 MMHG
BH CV ECHO MEAS - MV MEAN PG: 1.49 MMHG
BH CV ECHO MEAS - MV P1/2T: 77.7 MSEC
BH CV ECHO MEAS - MV V2 VTI: 23.4 CM
BH CV ECHO MEAS - MVA(P1/2T): 2.8 CM2
BH CV ECHO MEAS - MVA(VTI): 3.2 CM2
BH CV ECHO MEAS - PA ACC TIME: 0.13 SEC
BH CV ECHO MEAS - PA V2 MAX: 125.5 CM/SEC
BH CV ECHO MEAS - PI END-D VEL: 102.6 CM/SEC
BH CV ECHO MEAS - PULM A REVS DUR: 0.09 SEC
BH CV ECHO MEAS - PULM A REVS VEL: 35.3 CM/SEC
BH CV ECHO MEAS - PULM DIAS VEL: 44.5 CM/SEC
BH CV ECHO MEAS - PULM S/D: 1.56
BH CV ECHO MEAS - PULM SYS VEL: 69.4 CM/SEC
BH CV ECHO MEAS - QP/QS: 0.51
BH CV ECHO MEAS - RV MAX PG: 1.7 MMHG
BH CV ECHO MEAS - RV V1 MAX: 65.1 CM/SEC
BH CV ECHO MEAS - RV V1 VTI: 13.8 CM
BH CV ECHO MEAS - RVOT DIAM: 1.87 CM
BH CV ECHO MEAS - SI(MOD-SP2): 21.5 ML/M2
BH CV ECHO MEAS - SI(MOD-SP4): 33.7 ML/M2
BH CV ECHO MEAS - SUP REN AO DIAM: 2.3 CM
BH CV ECHO MEAS - SV(LVOT): 74.1 ML
BH CV ECHO MEAS - SV(MOD-SP2): 46 ML
BH CV ECHO MEAS - SV(MOD-SP4): 72 ML
BH CV ECHO MEAS - SV(RVOT): 37.9 ML
BH CV ECHO MEAS - TAPSE (>1.6): 2.26 CM
BH CV ECHO MEASUREMENTS AVERAGE E/E' RATIO: 7.83
BH CV STRESS BP STAGE 1: NORMAL
BH CV STRESS BP STAGE 2: NORMAL
BH CV STRESS BP STAGE 3: NORMAL
BH CV STRESS DURATION MIN STAGE 1: 3
BH CV STRESS DURATION MIN STAGE 2: 3
BH CV STRESS DURATION MIN STAGE 3: 3
BH CV STRESS DURATION SEC STAGE 1: 0
BH CV STRESS DURATION SEC STAGE 2: 0
BH CV STRESS DURATION SEC STAGE 3: 0
BH CV STRESS GRADE STAGE 1: 10
BH CV STRESS GRADE STAGE 2: 12
BH CV STRESS GRADE STAGE 3: 14
BH CV STRESS HR STAGE 1: 112
BH CV STRESS HR STAGE 2: 136
BH CV STRESS HR STAGE 3: 160
BH CV STRESS METS STAGE 1: 5
BH CV STRESS METS STAGE 2: 7.5
BH CV STRESS METS STAGE 3: 10
BH CV STRESS PROTOCOL 1: NORMAL
BH CV STRESS RECOVERY BP: NORMAL MMHG
BH CV STRESS RECOVERY HR: 99 BPM
BH CV STRESS SPEED STAGE 1: 1.7
BH CV STRESS SPEED STAGE 2: 2.5
BH CV STRESS SPEED STAGE 3: 3.4
BH CV STRESS STAGE 1: 1
BH CV STRESS STAGE 2: 2
BH CV STRESS STAGE 3: 3
BH CV XLRA - RV BASE: 3.7 CM
BH CV XLRA - RV LENGTH: 7.9 CM
BH CV XLRA - RV MID: 3.3 CM
BH CV XLRA - TDI S': 14.5 CM/SEC
LEFT ATRIUM VOLUME INDEX: 26 ML/M2
MAXIMAL PREDICTED HEART RATE: 148 BPM
PERCENT MAX PREDICTED HR: 108.11 %
SINUS: 3.9 CM
STJ: 3.4 CM
STRESS BASELINE BP: NORMAL MMHG
STRESS BASELINE HR: 85 BPM
STRESS PERCENT HR: 127 %
STRESS POST ESTIMATED WORKLOAD: 10 METS
STRESS POST EXERCISE DUR MIN: 9 MIN
STRESS POST EXERCISE DUR SEC: 0 SEC
STRESS POST PEAK BP: NORMAL MMHG
STRESS POST PEAK HR: 160 BPM
STRESS TARGET HR: 126 BPM

## 2023-10-31 PROCEDURE — 93018 CV STRESS TEST I&R ONLY: CPT | Performed by: INTERNAL MEDICINE

## 2023-10-31 PROCEDURE — 93306 TTE W/DOPPLER COMPLETE: CPT

## 2023-10-31 PROCEDURE — 93306 TTE W/DOPPLER COMPLETE: CPT | Performed by: INTERNAL MEDICINE

## 2023-10-31 PROCEDURE — 93017 CV STRESS TEST TRACING ONLY: CPT

## 2023-10-31 PROCEDURE — 93016 CV STRESS TEST SUPVJ ONLY: CPT | Performed by: INTERNAL MEDICINE

## 2023-11-01 DIAGNOSIS — I71.21 ANEURYSM OF THE ASCENDING AORTA, WITHOUT RUPTURE: ICD-10-CM

## 2023-11-01 DIAGNOSIS — I77.819 AORTIC DILATATION: Primary | ICD-10-CM

## 2023-11-01 NOTE — PROGRESS NOTES
Called and discussed echocardiogram and stress test results with the patient.  Fortunately the patient's symptoms of shortness of breath have resolved on their own.  Recommended 1 year follow-up with a repeat echocardiogram to follow-up on aortic dilatation.

## 2023-12-21 ENCOUNTER — HOSPITAL ENCOUNTER (OUTPATIENT)
Dept: CT IMAGING | Facility: HOSPITAL | Age: 73
Discharge: HOME | End: 2023-12-21
Admitting: NURSE PRACTITIONER
Payer: MEDICARE

## 2023-12-21 DIAGNOSIS — Z87.891 HISTORY OF TOBACCO ABUSE: ICD-10-CM

## 2023-12-21 DIAGNOSIS — R06.00 DYSPNEA, UNSPECIFIED TYPE: ICD-10-CM

## 2023-12-21 PROCEDURE — 71271 CT THORAX LUNG CANCER SCR C-: CPT

## 2024-01-08 DIAGNOSIS — Z87.891 HISTORY OF TOBACCO ABUSE: Primary | ICD-10-CM

## 2024-01-08 DIAGNOSIS — R91.1 PULMONARY NODULE: ICD-10-CM

## 2024-02-13 DIAGNOSIS — E78.5 DYSLIPIDEMIA: ICD-10-CM

## 2024-02-13 RX ORDER — ROSUVASTATIN CALCIUM 40 MG/1
40 TABLET, COATED ORAL DAILY
Qty: 90 TABLET | Refills: 2 | Status: SHIPPED | OUTPATIENT
Start: 2024-02-13

## 2024-04-05 ENCOUNTER — OFFICE VISIT (OUTPATIENT)
Dept: FAMILY MEDICINE CLINIC | Facility: CLINIC | Age: 74
End: 2024-04-05
Payer: MEDICARE

## 2024-04-05 VITALS
OXYGEN SATURATION: 98 % | BODY MASS INDEX: 28.5 KG/M2 | WEIGHT: 210.4 LBS | DIASTOLIC BLOOD PRESSURE: 92 MMHG | SYSTOLIC BLOOD PRESSURE: 142 MMHG | TEMPERATURE: 98.7 F | HEIGHT: 72 IN | HEART RATE: 80 BPM

## 2024-04-05 DIAGNOSIS — R97.20 ELEVATED PSA: ICD-10-CM

## 2024-04-05 DIAGNOSIS — M25.551 RIGHT HIP PAIN: ICD-10-CM

## 2024-04-05 DIAGNOSIS — J30.2 SEASONAL ALLERGIES: ICD-10-CM

## 2024-04-05 DIAGNOSIS — M54.50 LUMBAR BACK PAIN: ICD-10-CM

## 2024-04-05 DIAGNOSIS — Z00.00 MEDICARE ANNUAL WELLNESS VISIT, SUBSEQUENT: Primary | ICD-10-CM

## 2024-04-05 DIAGNOSIS — I10 ESSENTIAL HYPERTENSION: ICD-10-CM

## 2024-04-05 DIAGNOSIS — M15.9 PRIMARY OSTEOARTHRITIS INVOLVING MULTIPLE JOINTS: ICD-10-CM

## 2024-04-05 DIAGNOSIS — N40.0 BENIGN NODULAR PROSTATIC HYPERPLASIA: ICD-10-CM

## 2024-04-05 DIAGNOSIS — R91.1 PULMONARY NODULE: ICD-10-CM

## 2024-04-05 DIAGNOSIS — E78.5 DYSLIPIDEMIA: ICD-10-CM

## 2024-04-05 RX ORDER — CALCIUM CARBONATE 300MG(750)
400 TABLET,CHEWABLE ORAL DAILY
COMMUNITY

## 2024-04-05 NOTE — PROGRESS NOTES
The ABCs of the Annual Wellness Visit  Subsequent Medicare Wellness Visit    Subjective    Jose Luis Davis is a 73 y.o. male who presents for a Subsequent Medicare Wellness Visit.    The following portions of the patient's history were reviewed and   updated as appropriate: allergies, current medications, past family history, past medical history, past social history, past surgical history, and problem list.    Compared to one year ago, the patient feels his physical   health is the same.    Compared to one year ago, the patient feels his mental   health is the same.    Recent Hospitalizations:  This patient has had a [facility] admission record on file within the last 365 days.    Current Medical Providers:  Patient Care Team:  Shanae Dash APRN as PCP - General (Nurse Practitioner)  Nathan Freeman Jr., MD as Consulting Physician (Urology)  Melanie Castro MD as Consulting Physician (Cardiology)  Cari Alegre Jr., MD as Consulting Physician (Vascular Surgery)    Outpatient Medications Prior to Visit   Medication Sig Dispense Refill    APPLE CIDER VINEGAR PO Take  by mouth Daily. Liquid blend      Coenzyme Q10 (CO Q 10 PO) Take 200 mg by mouth Daily.      Magnesium 400 MG tablet Take 400 mg by mouth Daily.      Multiple Vitamins-Minerals (MULTIVITAMIN ADULT PO) Take 1 tablet/day by mouth.      rosuvastatin (CRESTOR) 40 MG tablet Take 1 tablet by mouth Daily. 90 tablet 2    Saw Palmetto 450 MG capsule Take  by mouth 2 (Two) Times a Day.      magnesium gluconate (MAGONATE) 500 MG tablet Take 1 tablet by mouth 2 (Two) Times a Day. 90 tablet 1     No facility-administered medications prior to visit.       No opioid medication identified on active medication list. I have reviewed chart for other potential  high risk medication/s and harmful drug interactions in the elderly.        Aspirin is not on active medication list.  Aspirin use is not indicated based on review of current medical  "condition/s. Risk of harm outweighs potential benefits.  .    Patient Active Problem List   Diagnosis    Ureterolithiasis    Benign nodular prostatic hyperplasia    Essential hypertension    Dyslipidemia    Osteoarthritis of multiple joints    Tension type headache    Migraine without aura    Dizziness    Benign paroxysmal positional vertigo    History of tobacco abuse    Elevated PSA    Immunization due    Dyspnea    Medicare annual wellness visit, subsequent    Chest pain, atypical    Pulmonary nodule    Seasonal allergies    Lumbar back pain    Right hip pain     Advance Care Planning   Advance Care Planning     Advance Directive is not on file.  ACP discussion was held with the patient during this visit. Patient has an advance directive (not in EMR), copy requested.     Objective    Vitals:    04/05/24 0927   BP: 142/92   BP Location: Left arm   Patient Position: Sitting   Cuff Size: Adult   Pulse: 80   Temp: 98.7 °F (37.1 °C)   SpO2: 98%   Weight: 95.4 kg (210 lb 6.4 oz)   Height: 182.9 cm (72.01\")     Estimated body mass index is 28.53 kg/m² as calculated from the following:    Height as of this encounter: 182.9 cm (72.01\").    Weight as of this encounter: 95.4 kg (210 lb 6.4 oz).           Does the patient have evidence of cognitive impairment? No    Lab Results   Component Value Date    CHLPL 109 04/05/2024    TRIG 72 04/05/2024    HDL 43 04/05/2024    LDL 51 04/05/2024    VLDL 15 04/05/2024        HEALTH RISK ASSESSMENT    Smoking Status:  Social History     Tobacco Use   Smoking Status Former   Smokeless Tobacco Not on file   Tobacco Comments    quit about 30 yrs ago     Alcohol Consumption:  Social History     Substance and Sexual Activity   Alcohol Use Yes    Alcohol/week: 6.0 standard drinks of alcohol    Types: 6 Cans of beer per week     Fall Risk Screen:    STEADI Fall Risk Assessment was completed, and patient is at LOW risk for falls.Assessment completed on:4/5/2024    Depression Screening:      " 2024     9:38 AM   PHQ-2/PHQ-9 Depression Screening   Little Interest or Pleasure in Doing Things 0-->not at all   Feeling Down, Depressed or Hopeless 0-->not at all   PHQ-9: Brief Depression Severity Measure Score 0       Health Habits and Functional and Cognitive Screenin/5/2024     9:33 AM   Functional & Cognitive Status   Do you have difficulty preparing food and eating? No   Do you have difficulty bathing yourself, getting dressed or grooming yourself? No   Do you have difficulty using the toilet? No   Do you have difficulty moving around from place to place? No   Do you have trouble with steps or getting out of a bed or a chair? No   Current Diet Well Balanced Diet   Dental Exam Up to date   Eye Exam Up to date   Exercise (times per week) 2 times per week   Current Exercises Include Stationary Bicycling/Spin Class;Weightlifting;Yard Work   Do you need help using the phone?  No   Are you deaf or do you have serious difficulty hearing?  Yes   Do you need help to go to places out of walking distance? No   Do you need help shopping? No   Do you need help preparing meals?  No   Do you need help with housework?  No   Do you need help with laundry? No   Do you need help taking your medications? No   Do you need help managing money? No   Do you ever drive or ride in a car without wearing a seat belt? No   Have you felt unusual stress, anger or loneliness in the last month? No   Who do you live with? Alone   If you need help, do you have trouble finding someone available to you? No   Have you been bothered in the last four weeks by sexual problems? No   Do you have difficulty concentrating, remembering or making decisions? Yes       Age-appropriate Screening Schedule:  Refer to the list below for future screening recommendations based on patient's age, sex and/or medical conditions. Orders for these recommended tests are listed in the plan section. The patient has been provided with a written  plan.    Health Maintenance   Topic Date Due    RSV Vaccine - Adults (1 - 1-dose 60+ series) Never done    ZOSTER VACCINE (2 of 2) 06/23/2017    COVID-19 Vaccine (5 - 2023-24 season) 09/01/2023    ANNUAL WELLNESS VISIT  04/04/2024    BMI FOLLOWUP  04/09/2024    INFLUENZA VACCINE  08/01/2024    TDAP/TD VACCINES (2 - Td or Tdap) 04/28/2027    COLORECTAL CANCER SCREENING  04/28/2027    HEPATITIS C SCREENING  Completed    Pneumococcal Vaccine 65+  Completed    AAA SCREEN (ONE-TIME)  Completed                  CMS Preventative Services Quick Reference  Risk Factors Identified During Encounter  Immunizations Discussed/Encouraged: Influenza, Prevnar 20 (Pneumococcal 20-valent conjugate), COVID19, and RSV (Respiratory Syncytial Virus)  Dental Screening Recommended  Vision Screening Recommended  The above risks/problems have been discussed with the patient.  Pertinent information has been shared with the patient in the After Visit Summary.  An After Visit Summary and PPPS were made available to the patient.    Follow Up:   Next Medicare Wellness visit to be scheduled in 1 year.       Additional E&M Note during same encounter follows:  Patient has multiple medical problems which are significant and separately identifiable that require additional work above and beyond the Medicare Wellness Visit.      Chief Complaint  Medicare Wellness-subsequent (Fasting/Go over CT done 12/23/) and Joint Pain (From right hip to shin /Getting worse/Family hx rheum arthritis/)    Subjective        Patient presents to the office today for a medicare annual physical exam. Today he has additional complaint of lumbar pain including right hip pain.   He has elevated PSA today needing referral to urology .   Blood pressure today is 142/92.      Jose Luis Davis is also being seen today for Patient presents to the office today for a medicare annual physical exam. Today he has additional complaint of lumbar pain including right hip pain.   He has  "elevated PSA today needing referral to urology .   Blood pressure today is 142/92.    Review of Systems   Constitutional:  Negative for activity change and appetite change.   HENT:  Negative for congestion.    Eyes:  Negative for discharge.   Respiratory:  Negative for apnea, cough, choking, chest tightness, shortness of breath, wheezing and stridor.    Gastrointestinal:  Negative for abdominal pain and anal bleeding.   Endocrine: Negative for cold intolerance and heat intolerance.   Genitourinary:  Negative for dysuria and flank pain.   Musculoskeletal:  Positive for arthralgias, back pain and myalgias.   Skin:  Negative for rash.   Allergic/Immunologic: Negative for environmental allergies.   Neurological:  Negative for dizziness, seizures, numbness and confusion.   Hematological:  Negative for adenopathy. Does not bruise/bleed easily.   Psychiatric/Behavioral:  Negative for agitation.        Objective   Vital Signs:  /92 (BP Location: Left arm, Patient Position: Sitting, Cuff Size: Adult)   Pulse 80   Temp 98.7 °F (37.1 °C)   Ht 182.9 cm (72.01\")   Wt 95.4 kg (210 lb 6.4 oz)   SpO2 98%   BMI 28.53 kg/m²     Physical Exam  Constitutional:       General: He is not in acute distress.     Appearance: Normal appearance.   HENT:      Head: Normocephalic.      Right Ear: Tympanic membrane, ear canal and external ear normal.      Left Ear: Tympanic membrane, ear canal and external ear normal.      Nose: Nose normal.      Mouth/Throat:      Mouth: Mucous membranes are moist.      Pharynx: No oropharyngeal exudate.   Eyes:      Pupils: Pupils are equal, round, and reactive to light.   Cardiovascular:      Rate and Rhythm: Normal rate and regular rhythm.      Pulses: Normal pulses.      Heart sounds: Normal heart sounds.   Pulmonary:      Effort: Pulmonary effort is normal. No respiratory distress.      Breath sounds: Normal breath sounds. No wheezing.   Abdominal:      General: Abdomen is flat. Bowel sounds are " normal. There is no distension.      Palpations: Abdomen is soft. There is no mass.      Tenderness: There is no abdominal tenderness.      Hernia: No hernia is present.   Musculoskeletal:         General: Tenderness present. Normal range of motion.      Cervical back: Normal, normal range of motion and neck supple. No spasms or tenderness. Normal range of motion.      Thoracic back: Normal. No spasms or tenderness. Normal range of motion.      Lumbar back: Spasms and tenderness present. No edema or bony tenderness. Decreased range of motion. No scoliosis.      Right hip: Tenderness present. Decreased range of motion.   Skin:     General: Skin is warm.   Neurological:      General: No focal deficit present.      Mental Status: He is alert and oriented to person, place, and time.   Psychiatric:         Mood and Affect: Mood normal.         Behavior: Behavior normal.         Thought Content: Thought content normal.         Judgment: Judgment normal.                         Assessment and Plan   Diagnoses and all orders for this visit:    1. Medicare annual wellness visit, subsequent (Primary)  Assessment & Plan:  Counseling was provided on nutrition, physical activity, development, and injury prevention, dental health, and safe sex practices patient verbalizes understanding no additional questions were asked.        2. Dyslipidemia  Assessment & Plan:  HLD continue healthy diet and exercise     Orders:  -     Lipid Panel    3. Essential hypertension  Assessment & Plan:  Hypertension is stable and controlled  Continue current treatment regimen.  Blood pressure will be reassessed in 6 months.    Orders:  -     Comprehensive Metabolic Panel    4. Primary osteoarthritis involving multiple joints  -     JD  -     Rheumatoid Factor  -     Uric Acid  -     Sedimentation Rate  -     C-reactive Protein    5. Seasonal allergies  -     CBC & Differential    6. Pulmonary nodule  Assessment & Plan:  6mm nodule repeat 6 month due  in June 2024    Orders:  -     CT Chest Without Contrast; Future    7. Right hip pain  -     XR hip w or wo pelvis 2-3 view right; Future    8. Lumbar back pain  Assessment & Plan:  Advised to alternate motrin/ tylenol   Side effects of all new and old medications reviewed with the patient -willing to accept all risks involved.  Advised to rto if no improvement or worsening of symptoms.  Patient instructed to  clinical summary at .       Orders:  -     XR Spine Lumbar 4+ View; Future    9. Elevated PSA  -     Ambulatory Referral to Urology    10. Benign nodular prostatic hyperplasia  Assessment & Plan:  Refer PSA    Orders:  -     Ambulatory Referral to Urology           I spent 30 minutes caring for Jose Luis on this date of service. This time includes time spent by me in the following activities:preparing for the visit, reviewing tests, obtaining and/or reviewing a separately obtained history, performing a medically appropriate examination and/or evaluation , counseling and educating the patient/family/caregiver, ordering medications, tests, or procedures, documenting information in the medical record, independently interpreting results and communicating that information with the patient/family/caregiver, and care coordination  Follow Up   No follow-ups on file.  Patient was given instructions and counseling regarding his condition or for health maintenance advice. Please see specific information pulled into the AVS if appropriate.

## 2024-04-08 LAB
ALBUMIN SERPL-MCNC: 4.8 G/DL (ref 3.5–5.2)
ALBUMIN/GLOB SERPL: 1.8 G/DL
ALP SERPL-CCNC: 93 U/L (ref 39–117)
ALT SERPL-CCNC: 33 U/L (ref 1–41)
ANA SER QL: NEGATIVE
AST SERPL-CCNC: 26 U/L (ref 1–40)
BASOPHILS # BLD AUTO: 0.04 10*3/MM3 (ref 0–0.2)
BASOPHILS NFR BLD AUTO: 0.6 % (ref 0–1.5)
BILIRUB SERPL-MCNC: 1.3 MG/DL (ref 0–1.2)
BUN SERPL-MCNC: 17 MG/DL (ref 8–23)
BUN/CREAT SERPL: 15.6 (ref 7–25)
CALCIUM SERPL-MCNC: 10.1 MG/DL (ref 8.6–10.5)
CHLORIDE SERPL-SCNC: 103 MMOL/L (ref 98–107)
CHOLEST SERPL-MCNC: 109 MG/DL (ref 0–200)
CO2 SERPL-SCNC: 24.5 MMOL/L (ref 22–29)
CREAT SERPL-MCNC: 1.09 MG/DL (ref 0.76–1.27)
CRP SERPL-MCNC: <0.3 MG/DL (ref 0–0.5)
EGFRCR SERPLBLD CKD-EPI 2021: 72.1 ML/MIN/1.73
EOSINOPHIL # BLD AUTO: 0.07 10*3/MM3 (ref 0–0.4)
EOSINOPHIL NFR BLD AUTO: 1.1 % (ref 0.3–6.2)
ERYTHROCYTE [DISTWIDTH] IN BLOOD BY AUTOMATED COUNT: 12.1 % (ref 12.3–15.4)
ERYTHROCYTE [SEDIMENTATION RATE] IN BLOOD BY WESTERGREN METHOD: 2 MM/HR (ref 0–20)
GLOBULIN SER CALC-MCNC: 2.6 GM/DL
GLUCOSE SERPL-MCNC: 99 MG/DL (ref 65–99)
HCT VFR BLD AUTO: 43.2 % (ref 37.5–51)
HDLC SERPL-MCNC: 43 MG/DL (ref 40–60)
HGB BLD-MCNC: 14.9 G/DL (ref 13–17.7)
IMM GRANULOCYTES # BLD AUTO: 0.01 10*3/MM3 (ref 0–0.05)
IMM GRANULOCYTES NFR BLD AUTO: 0.2 % (ref 0–0.5)
LDLC SERPL CALC-MCNC: 51 MG/DL (ref 0–100)
LYMPHOCYTES # BLD AUTO: 1.85 10*3/MM3 (ref 0.7–3.1)
LYMPHOCYTES NFR BLD AUTO: 29.6 % (ref 19.6–45.3)
MCH RBC QN AUTO: 31.7 PG (ref 26.6–33)
MCHC RBC AUTO-ENTMCNC: 34.5 G/DL (ref 31.5–35.7)
MCV RBC AUTO: 91.9 FL (ref 79–97)
MONOCYTES # BLD AUTO: 0.59 10*3/MM3 (ref 0.1–0.9)
MONOCYTES NFR BLD AUTO: 9.4 % (ref 5–12)
NEUTROPHILS # BLD AUTO: 3.7 10*3/MM3 (ref 1.7–7)
NEUTROPHILS NFR BLD AUTO: 59.1 % (ref 42.7–76)
NRBC BLD AUTO-RTO: 0 /100 WBC (ref 0–0.2)
PLATELET # BLD AUTO: 276 10*3/MM3 (ref 140–450)
POTASSIUM SERPL-SCNC: 4.6 MMOL/L (ref 3.5–5.2)
PROT SERPL-MCNC: 7.4 G/DL (ref 6–8.5)
RBC # BLD AUTO: 4.7 10*6/MM3 (ref 4.14–5.8)
RHEUMATOID FACT SERPL-ACNC: <10 IU/ML
SODIUM SERPL-SCNC: 141 MMOL/L (ref 136–145)
TRIGL SERPL-MCNC: 72 MG/DL (ref 0–150)
URATE SERPL-MCNC: 6 MG/DL (ref 3.4–7)
VLDLC SERPL CALC-MCNC: 15 MG/DL (ref 5–40)
WBC # BLD AUTO: 6.26 10*3/MM3 (ref 3.4–10.8)

## 2024-04-10 ENCOUNTER — HOSPITAL ENCOUNTER (OUTPATIENT)
Dept: GENERAL RADIOLOGY | Facility: HOSPITAL | Age: 74
Discharge: HOME | End: 2024-04-10
Payer: MEDICARE

## 2024-04-10 DIAGNOSIS — M25.551 RIGHT HIP PAIN: ICD-10-CM

## 2024-04-10 DIAGNOSIS — M54.50 LUMBAR BACK PAIN: ICD-10-CM

## 2024-04-10 PROCEDURE — 73502 X-RAY EXAM HIP UNI 2-3 VIEWS: CPT

## 2024-04-10 PROCEDURE — 72110 X-RAY EXAM L-2 SPINE 4/>VWS: CPT

## 2024-04-11 NOTE — ASSESSMENT & PLAN NOTE
Advised to alternate motrin/ tylenol   Side effects of all new and old medications reviewed with the patient -willing to accept all risks involved.  Advised to rto if no improvement or worsening of symptoms.  Patient instructed to  clinical summary at .

## 2024-04-12 DIAGNOSIS — M25.551 RIGHT HIP PAIN: Primary | ICD-10-CM

## 2024-05-06 ENCOUNTER — OFFICE VISIT (OUTPATIENT)
Dept: ORTHOPEDIC SURGERY | Facility: CLINIC | Age: 74
End: 2024-05-06
Payer: MEDICARE

## 2024-05-06 VITALS — TEMPERATURE: 98.4 F | WEIGHT: 204.3 LBS | BODY MASS INDEX: 27.67 KG/M2 | HEIGHT: 72 IN

## 2024-05-06 DIAGNOSIS — M25.551 RIGHT HIP PAIN: Primary | ICD-10-CM

## 2024-05-06 DIAGNOSIS — M16.11 PRIMARY OSTEOARTHRITIS OF RIGHT HIP: Primary | ICD-10-CM

## 2024-05-06 PROCEDURE — 1159F MED LIST DOCD IN RCRD: CPT | Performed by: ORTHOPAEDIC SURGERY

## 2024-05-06 PROCEDURE — 99204 OFFICE O/P NEW MOD 45 MIN: CPT | Performed by: ORTHOPAEDIC SURGERY

## 2024-05-06 PROCEDURE — 1160F RVW MEDS BY RX/DR IN RCRD: CPT | Performed by: ORTHOPAEDIC SURGERY

## 2024-05-09 ENCOUNTER — OFFICE VISIT (OUTPATIENT)
Dept: SPORTS MEDICINE | Facility: CLINIC | Age: 74
End: 2024-05-09
Payer: MEDICARE

## 2024-05-09 VITALS
DIASTOLIC BLOOD PRESSURE: 88 MMHG | TEMPERATURE: 98.4 F | WEIGHT: 204 LBS | HEART RATE: 71 BPM | OXYGEN SATURATION: 95 % | RESPIRATION RATE: 12 BRPM | BODY MASS INDEX: 27.63 KG/M2 | SYSTOLIC BLOOD PRESSURE: 157 MMHG | HEIGHT: 72 IN

## 2024-05-09 DIAGNOSIS — M16.11 PRIMARY OSTEOARTHRITIS OF RIGHT HIP: Primary | ICD-10-CM

## 2024-05-09 PROCEDURE — 1160F RVW MEDS BY RX/DR IN RCRD: CPT | Performed by: STUDENT IN AN ORGANIZED HEALTH CARE EDUCATION/TRAINING PROGRAM

## 2024-05-09 PROCEDURE — 3077F SYST BP >= 140 MM HG: CPT | Performed by: STUDENT IN AN ORGANIZED HEALTH CARE EDUCATION/TRAINING PROGRAM

## 2024-05-09 PROCEDURE — 1159F MED LIST DOCD IN RCRD: CPT | Performed by: STUDENT IN AN ORGANIZED HEALTH CARE EDUCATION/TRAINING PROGRAM

## 2024-05-09 PROCEDURE — 3079F DIAST BP 80-89 MM HG: CPT | Performed by: STUDENT IN AN ORGANIZED HEALTH CARE EDUCATION/TRAINING PROGRAM

## 2024-05-09 PROCEDURE — 20611 DRAIN/INJ JOINT/BURSA W/US: CPT | Performed by: STUDENT IN AN ORGANIZED HEALTH CARE EDUCATION/TRAINING PROGRAM

## 2024-05-09 RX ORDER — LIDOCAINE HYDROCHLORIDE 10 MG/ML
2 INJECTION, SOLUTION INFILTRATION; PERINEURAL
Status: SHIPPED | OUTPATIENT
Start: 2024-05-09

## 2024-05-09 RX ORDER — METHYLPREDNISOLONE ACETATE 80 MG/ML
80 INJECTION, SUSPENSION INTRA-ARTICULAR; INTRALESIONAL; INTRAMUSCULAR; SOFT TISSUE
Status: SHIPPED | OUTPATIENT
Start: 2024-05-09

## 2024-05-09 RX ADMIN — LIDOCAINE HYDROCHLORIDE 2 ML: 10 INJECTION, SOLUTION INFILTRATION; PERINEURAL at 08:52

## 2024-05-09 RX ADMIN — METHYLPREDNISOLONE ACETATE 80 MG: 80 INJECTION, SUSPENSION INTRA-ARTICULAR; INTRALESIONAL; INTRAMUSCULAR; SOFT TISSUE at 08:52

## 2024-05-10 ENCOUNTER — PATIENT ROUNDING (BHMG ONLY) (OUTPATIENT)
Dept: SPORTS MEDICINE | Facility: CLINIC | Age: 74
End: 2024-05-10
Payer: MEDICARE

## 2024-05-15 ENCOUNTER — TRANSCRIBE ORDERS (OUTPATIENT)
Dept: ADMINISTRATIVE | Facility: HOSPITAL | Age: 74
End: 2024-05-15
Payer: MEDICARE

## 2024-05-15 DIAGNOSIS — R97.20 ELEVATED PROSTATE SPECIFIC ANTIGEN (PSA): Primary | ICD-10-CM

## 2024-06-13 ENCOUNTER — HOSPITAL ENCOUNTER (OUTPATIENT)
Dept: CT IMAGING | Facility: HOSPITAL | Age: 74
Discharge: HOME | End: 2024-06-13
Payer: MEDICARE

## 2024-06-13 ENCOUNTER — HOSPITAL ENCOUNTER (OUTPATIENT)
Facility: HOSPITAL | Age: 74
Discharge: HOME | End: 2024-06-13
Payer: MEDICARE

## 2024-06-13 DIAGNOSIS — R91.1 PULMONARY NODULE: ICD-10-CM

## 2024-06-13 DIAGNOSIS — R97.20 ELEVATED PROSTATE SPECIFIC ANTIGEN (PSA): ICD-10-CM

## 2024-06-13 PROCEDURE — 71250 CT THORAX DX C-: CPT

## 2024-06-13 PROCEDURE — 0 GADOBENATE DIMEGLUMINE 529 MG/ML SOLUTION: Performed by: UROLOGY

## 2024-06-13 PROCEDURE — A9577 INJ MULTIHANCE: HCPCS | Performed by: UROLOGY

## 2024-06-13 PROCEDURE — 72197 MRI PELVIS W/O & W/DYE: CPT

## 2024-06-13 RX ADMIN — GADOBENATE DIMEGLUMINE 20 ML: 529 INJECTION, SOLUTION INTRAVENOUS at 18:09

## 2024-08-12 ENCOUNTER — OFFICE VISIT (OUTPATIENT)
Dept: ORTHOPEDIC SURGERY | Facility: CLINIC | Age: 74
End: 2024-08-12
Payer: MEDICARE

## 2024-08-12 VITALS — WEIGHT: 202.7 LBS | BODY MASS INDEX: 27.45 KG/M2 | HEIGHT: 72 IN | TEMPERATURE: 98.7 F

## 2024-08-12 DIAGNOSIS — M16.11 PRIMARY OSTEOARTHRITIS OF RIGHT HIP: Primary | ICD-10-CM

## 2024-08-12 PROCEDURE — 99213 OFFICE O/P EST LOW 20 MIN: CPT | Performed by: ORTHOPAEDIC SURGERY

## 2024-08-12 PROCEDURE — 1159F MED LIST DOCD IN RCRD: CPT | Performed by: ORTHOPAEDIC SURGERY

## 2024-08-12 PROCEDURE — 1160F RVW MEDS BY RX/DR IN RCRD: CPT | Performed by: ORTHOPAEDIC SURGERY

## 2024-08-12 RX ORDER — SODIUM PHOSPHATE,MONO-DIBASIC 19G-7G/118
500 ENEMA (ML) RECTAL
COMMUNITY

## 2024-08-12 NOTE — PROGRESS NOTES
"Patient: Jose Luis Davis  YOB: 1950 73 y.o. male  Medical Record Number: [id number]    Chief Complaint:   Chief Complaint   Patient presents with    Right Hip - Pain, Follow-up         History of Present Illness:Jose Luis Davis is a 73 y.o. male who presents for follow-up of right hip pain.  Patient was seen previously diagnosed with right hip arthritis.  He did get an injection and stated symptoms were completely alleviated for about 2 months.  Over these past 3 to 4 weeks he feels that symptoms are reoccurring some but not to the point that they were previously.    Allergies: No Known Allergies    Medications:   Current Outpatient Medications   Medication Sig Dispense Refill    APPLE CIDER VINEGAR PO Take  by mouth Daily. Liquid blend      Coenzyme Q10 (CO Q 10 PO) Take 200 mg by mouth Daily.      glucosamine sulfate 500 MG capsule capsule Take 1 capsule by mouth 3 (Three) Times a Day With Meals.      Magnesium 400 MG tablet Take 400 mg by mouth Daily.      Multiple Vitamins-Minerals (MULTIVITAMIN ADULT PO) Take 1 tablet/day by mouth.      rosuvastatin (CRESTOR) 40 MG tablet Take 1 tablet by mouth Daily. 90 tablet 2    Saw Palmetto 450 MG capsule Take  by mouth 2 (Two) Times a Day.       No current facility-administered medications for this visit.         The following portions of the patient's history were reviewed and updated as appropriate: allergies, current medications, past family history, past medical history, past social history, past surgical history and problem list.    Review of Systems:   A 14 point review of systems was performed. All systems negative except pertinent positives/negative listed in HPI above    Physical Exam:   Vitals:    08/12/24 1011   Temp: 98.7 °F (37.1 °C)   TempSrc: Temporal   Weight: 91.9 kg (202 lb 11.2 oz)   Height: 182.9 cm (72.01\")   PainSc:   8   PainLoc: Hip       General: A and O x 3, ASA, NAD    SCLERA:    Normal    DENTITION:   Normal  Exam " unchanged        Assessment/Plan:    Right hip osteoarthritis    Again went over conservative surgical treatment option with the patient.  He would like to stay away from surgery for his arms possible.  Told him that with hip injections out only advise couple year.  He did respond fairly well to the last 1.  I think if his symptoms are limiting him again to try another 1 would be reasonable.  He is in favor of this plan.  Will plan to see him in about 6 months.  If any changes are during the interim he will see us sooner.  All questions answered.        Warren Palacios MD  8/12/2024

## 2024-10-07 ENCOUNTER — OFFICE VISIT (OUTPATIENT)
Dept: FAMILY MEDICINE CLINIC | Facility: CLINIC | Age: 74
End: 2024-10-07
Payer: MEDICARE

## 2024-10-07 VITALS
DIASTOLIC BLOOD PRESSURE: 88 MMHG | WEIGHT: 203.8 LBS | HEIGHT: 72 IN | SYSTOLIC BLOOD PRESSURE: 144 MMHG | OXYGEN SATURATION: 98 % | HEART RATE: 68 BPM | BODY MASS INDEX: 27.6 KG/M2 | TEMPERATURE: 98.2 F

## 2024-10-07 DIAGNOSIS — I10 ESSENTIAL HYPERTENSION: ICD-10-CM

## 2024-10-07 DIAGNOSIS — M15.0 PRIMARY OSTEOARTHRITIS INVOLVING MULTIPLE JOINTS: Primary | ICD-10-CM

## 2024-10-07 DIAGNOSIS — Z23 NEED FOR IMMUNIZATION AGAINST INFLUENZA: ICD-10-CM

## 2024-10-07 DIAGNOSIS — E78.5 DYSLIPIDEMIA: ICD-10-CM

## 2024-10-07 DIAGNOSIS — R39.11 BENIGN PROSTATIC HYPERPLASIA WITH URINARY HESITANCY: ICD-10-CM

## 2024-10-07 DIAGNOSIS — N40.1 BENIGN PROSTATIC HYPERPLASIA WITH URINARY HESITANCY: ICD-10-CM

## 2024-10-07 LAB
ALBUMIN SERPL-MCNC: 4.8 G/DL (ref 3.5–5.2)
ALBUMIN/GLOB SERPL: 1.7 G/DL
ALP SERPL-CCNC: 99 U/L (ref 39–117)
ALT SERPL-CCNC: 33 U/L (ref 1–41)
AST SERPL-CCNC: 27 U/L (ref 1–40)
BILIRUB SERPL-MCNC: 0.8 MG/DL (ref 0–1.2)
BUN SERPL-MCNC: 15 MG/DL (ref 8–23)
BUN/CREAT SERPL: 16.3 (ref 7–25)
CALCIUM SERPL-MCNC: 9.9 MG/DL (ref 8.6–10.5)
CHLORIDE SERPL-SCNC: 105 MMOL/L (ref 98–107)
CHOLEST SERPL-MCNC: 133 MG/DL (ref 0–200)
CO2 SERPL-SCNC: 25.5 MMOL/L (ref 22–29)
CREAT SERPL-MCNC: 0.92 MG/DL (ref 0.76–1.27)
EGFRCR SERPLBLD CKD-EPI 2021: 87.8 ML/MIN/1.73
GLOBULIN SER CALC-MCNC: 2.8 GM/DL
GLUCOSE SERPL-MCNC: 111 MG/DL (ref 65–99)
HDLC SERPL-MCNC: 49 MG/DL (ref 40–60)
LDLC SERPL CALC-MCNC: 70 MG/DL (ref 0–100)
POTASSIUM SERPL-SCNC: 5.1 MMOL/L (ref 3.5–5.2)
PROT SERPL-MCNC: 7.6 G/DL (ref 6–8.5)
SODIUM SERPL-SCNC: 142 MMOL/L (ref 136–145)
TRIGL SERPL-MCNC: 67 MG/DL (ref 0–150)
VLDLC SERPL CALC-MCNC: 14 MG/DL (ref 5–40)

## 2024-10-07 PROCEDURE — 1159F MED LIST DOCD IN RCRD: CPT | Performed by: NURSE PRACTITIONER

## 2024-10-07 PROCEDURE — 1160F RVW MEDS BY RX/DR IN RCRD: CPT | Performed by: NURSE PRACTITIONER

## 2024-10-07 PROCEDURE — 99214 OFFICE O/P EST MOD 30 MIN: CPT | Performed by: NURSE PRACTITIONER

## 2024-10-07 PROCEDURE — 3077F SYST BP >= 140 MM HG: CPT | Performed by: NURSE PRACTITIONER

## 2024-10-07 PROCEDURE — G0008 ADMIN INFLUENZA VIRUS VAC: HCPCS | Performed by: NURSE PRACTITIONER

## 2024-10-07 PROCEDURE — 3079F DIAST BP 80-89 MM HG: CPT | Performed by: NURSE PRACTITIONER

## 2024-10-07 PROCEDURE — 1125F AMNT PAIN NOTED PAIN PRSNT: CPT | Performed by: NURSE PRACTITIONER

## 2024-10-07 PROCEDURE — 90662 IIV NO PRSV INCREASED AG IM: CPT | Performed by: NURSE PRACTITIONER

## 2024-10-07 RX ORDER — ROSUVASTATIN CALCIUM 40 MG/1
40 TABLET, COATED ORAL DAILY
Qty: 90 TABLET | Refills: 2 | Status: SHIPPED | OUTPATIENT
Start: 2024-10-07

## 2024-10-07 NOTE — PROGRESS NOTES
"Chief Complaint  Hypertension (6 month follow up/) and Flu Vaccine    Subjective        Jose Luis Davis presents to [facility]  History of Present Illness  Patient presents to the office today for follow-up on hypertension.  Blood pressure 144/88.  Patient reports checking blood pressure at home with normal readings.  He works on healthy diet and exercise.  With severe BPH patient is following urology.  Most recent imaging is MRI of prostate.  With hyperlipidemia stable for statin therapy.  With osteoarthritis patient is following orthopedic.                        Objective   Vital Signs:  /88 (BP Location: Left arm, Patient Position: Sitting, Cuff Size: Adult)   Pulse 68   Temp 98.2 °F (36.8 °C)   Ht 182.9 cm (72.01\")   Wt 92.4 kg (203 lb 12.8 oz)   SpO2 98%   BMI 27.63 kg/m²   Estimated body mass index is 27.63 kg/m² as calculated from the following:    Height as of this encounter: 182.9 cm (72.01\").    Weight as of this encounter: 92.4 kg (203 lb 12.8 oz).    BMI is >= 25 and <30. (Overweight) The following options were offered after discussion;: weight loss educational material (shared in after visit summary) and exercise counseling/recommendations      Physical Exam  Constitutional:       General: He is not in acute distress.     Appearance: Normal appearance.   HENT:      Head: Normocephalic.   Eyes:      Pupils: Pupils are equal, round, and reactive to light.   Cardiovascular:      Rate and Rhythm: Normal rate.      Pulses: Normal pulses.      Heart sounds: Normal heart sounds.   Pulmonary:      Effort: Pulmonary effort is normal.      Breath sounds: Normal breath sounds.   Musculoskeletal:         General: Normal range of motion.      Cervical back: Normal range of motion and neck supple.   Skin:     General: Skin is warm.   Neurological:      General: No focal deficit present.      Mental Status: He is alert and oriented to person, place, and time.   Psychiatric: "         Mood and Affect: Mood normal.         Behavior: Behavior normal.         Thought Content: Thought content normal.         Judgment: Judgment normal.        Result Review :  The following data was reviewed by: NICHOLAS Palafox on 10/07/2024:  Common labs          4/5/2024    10:34   Common Labs   Glucose 99    BUN 17    Creatinine 1.09    Sodium 141    Potassium 4.6    Chloride 103    Calcium 10.1    Total Protein 7.4    Albumin 4.8    Total Bilirubin 1.3    Alkaline Phosphatase 93    AST (SGOT) 26    ALT (SGPT) 33    WBC 6.26    Hemoglobin 14.9    Hematocrit 43.2    Platelets 276    Total Cholesterol 109    Triglycerides 72    HDL Cholesterol 43    LDL Cholesterol  51    Uric Acid 6.0      Data reviewed : Radiologic studies CT chest; MRI prostate            Assessment and Plan   Diagnoses and all orders for this visit:    1. Primary osteoarthritis involving multiple joints (Primary)    2. Dyslipidemia  Assessment & Plan:  Stable with statin     Orders:  -     rosuvastatin (CRESTOR) 40 MG tablet; Take 1 tablet by mouth Daily.  Dispense: 90 tablet; Refill: 2  -     Lipid Panel    3. Need for immunization against influenza  -     Fluzone High-Dose 65+yrs ([id number])    4. Essential hypertension  Assessment & Plan:  Hypertension is stable and controlled  Continue current treatment regimen.  Blood pressure will be reassessed in 6 months.    Orders:  -     Comprehensive Metabolic Panel    5. Benign prostatic hyperplasia with urinary hesitancy  Assessment & Plan:  Stable follows urology       Follows orthopedic for osteoarthritis        I spent 30 minutes caring for Jose Luis on this date of service. This time includes time spent by me in the following activities:preparing for the visit, reviewing tests, obtaining and/or reviewing a separately obtained history, performing a medically appropriate examination and/or evaluation , counseling and educating the patient/family/caregiver, ordering medications, tests,  or procedures, documenting information in the medical record, independently interpreting results and communicating that information with the patient/family/caregiver, and care coordination  Follow Up   Return in about 6 months (around 4/8/2025).  Patient was given instructions and counseling regarding his condition or for health maintenance advice. Please see specific information pulled into the AVS if appropriate.

## 2024-11-01 ENCOUNTER — HOSPITAL ENCOUNTER (OUTPATIENT)
Dept: CARDIOLOGY | Facility: HOSPITAL | Age: 74
Discharge: HOME | End: 2024-11-01
Payer: MEDICARE

## 2024-11-01 ENCOUNTER — OFFICE VISIT (OUTPATIENT)
Dept: CARDIOLOGY | Facility: CLINIC | Age: 74
End: 2024-11-01
Payer: MEDICARE

## 2024-11-01 VITALS
BODY MASS INDEX: 27.5 KG/M2 | WEIGHT: 203 LBS | SYSTOLIC BLOOD PRESSURE: 128 MMHG | HEART RATE: 66 BPM | DIASTOLIC BLOOD PRESSURE: 70 MMHG | HEIGHT: 72 IN

## 2024-11-01 VITALS
HEIGHT: 72 IN | DIASTOLIC BLOOD PRESSURE: 70 MMHG | BODY MASS INDEX: 27.5 KG/M2 | HEART RATE: 69 BPM | WEIGHT: 203 LBS | SYSTOLIC BLOOD PRESSURE: 128 MMHG | OXYGEN SATURATION: 98 %

## 2024-11-01 DIAGNOSIS — I77.819 AORTIC DILATATION: ICD-10-CM

## 2024-11-01 DIAGNOSIS — E78.5 DYSLIPIDEMIA: ICD-10-CM

## 2024-11-01 DIAGNOSIS — I71.21 ANEURYSM OF THE ASCENDING AORTA, WITHOUT RUPTURE: ICD-10-CM

## 2024-11-01 DIAGNOSIS — I10 ESSENTIAL HYPERTENSION: Primary | ICD-10-CM

## 2024-11-01 LAB
AORTIC ARCH: 3.1 CM
AORTIC DIMENSIONLESS INDEX: 0.9 (DI)
ASCENDING AORTA: 3.9 CM
BH CV ECHO LEFT VENTRICLE GLOBAL LONGITUDINAL STRAIN: -20.5 %
BH CV ECHO MEAS - ACS: 2.21 CM
BH CV ECHO MEAS - AI P1/2T: 560.1 MSEC
BH CV ECHO MEAS - AO MAX PG: 6.2 MMHG
BH CV ECHO MEAS - AO MEAN PG: 3 MMHG
BH CV ECHO MEAS - AO ROOT AREA (BSA CORRECTED): 1.9 CM2
BH CV ECHO MEAS - AO ROOT DIAM: 4.1 CM
BH CV ECHO MEAS - AO V2 MAX: 124 CM/SEC
BH CV ECHO MEAS - AO V2 VTI: 24.6 CM
BH CV ECHO MEAS - AVA(I,D): 3.2 CM2
BH CV ECHO MEAS - EDV(CUBED): 97.3 ML
BH CV ECHO MEAS - EDV(MOD-SP2): 100 ML
BH CV ECHO MEAS - EDV(MOD-SP4): 96 ML
BH CV ECHO MEAS - EF(MOD-BP): 60.1 %
BH CV ECHO MEAS - EF(MOD-SP2): 62 %
BH CV ECHO MEAS - EF(MOD-SP4): 58.3 %
BH CV ECHO MEAS - EF_3D-VOL: 60 %
BH CV ECHO MEAS - ESV(CUBED): 31.8 ML
BH CV ECHO MEAS - ESV(MOD-SP2): 38 ML
BH CV ECHO MEAS - ESV(MOD-SP4): 40 ML
BH CV ECHO MEAS - FS: 31.1 %
BH CV ECHO MEAS - IVS/LVPW: 0.9 CM
BH CV ECHO MEAS - IVSD: 0.9 CM
BH CV ECHO MEAS - LA 3D VOL INDEX: 23
BH CV ECHO MEAS - LAT PEAK E' VEL: 9.4 CM/SEC
BH CV ECHO MEAS - LV DIASTOLIC VOL/BSA (35-75): 44.8 CM2
BH CV ECHO MEAS - LV MASS(C)D: 148.1 GRAMS
BH CV ECHO MEAS - LV MAX PG: 4.7 MMHG
BH CV ECHO MEAS - LV MEAN PG: 2 MMHG
BH CV ECHO MEAS - LV SYSTOLIC VOL/BSA (12-30): 18.7 CM2
BH CV ECHO MEAS - LV V1 MAX: 108 CM/SEC
BH CV ECHO MEAS - LV V1 VTI: 22.7 CM
BH CV ECHO MEAS - LVIDD: 4.6 CM
BH CV ECHO MEAS - LVIDS: 3.2 CM
BH CV ECHO MEAS - LVOT AREA: 3.5 CM2
BH CV ECHO MEAS - LVOT DIAM: 2.1 CM
BH CV ECHO MEAS - LVPWD: 1 CM
BH CV ECHO MEAS - MED PEAK E' VEL: 10.2 CM/SEC
BH CV ECHO MEAS - MV A DUR: 0.14 SEC
BH CV ECHO MEAS - MV A MAX VEL: 103 CM/SEC
BH CV ECHO MEAS - MV DEC SLOPE: 392.7 CM/SEC2
BH CV ECHO MEAS - MV DEC TIME: 0.17 SEC
BH CV ECHO MEAS - MV E MAX VEL: 87 CM/SEC
BH CV ECHO MEAS - MV E/A: 0.84
BH CV ECHO MEAS - MV MAX PG: 3.4 MMHG
BH CV ECHO MEAS - MV MEAN PG: 1.14 MMHG
BH CV ECHO MEAS - MV P1/2T: 61.7 MSEC
BH CV ECHO MEAS - MV V2 VTI: 19.1 CM
BH CV ECHO MEAS - MVA(P1/2T): 3.6 CM2
BH CV ECHO MEAS - MVA(VTI): 4.1 CM2
BH CV ECHO MEAS - PA ACC TIME: 0.17 SEC
BH CV ECHO MEAS - PA V2 MAX: 121.2 CM/SEC
BH CV ECHO MEAS - PULM A REVS DUR: 0.15 SEC
BH CV ECHO MEAS - PULM A REVS VEL: 30.8 CM/SEC
BH CV ECHO MEAS - PULM DIAS VEL: 33.6 CM/SEC
BH CV ECHO MEAS - PULM S/D: 1.55
BH CV ECHO MEAS - PULM SYS VEL: 52.1 CM/SEC
BH CV ECHO MEAS - QP/QS: 0.73
BH CV ECHO MEAS - RV MAX PG: 1.43 MMHG
BH CV ECHO MEAS - RV V1 MAX: 59.7 CM/SEC
BH CV ECHO MEAS - RV V1 VTI: 13.7 CM
BH CV ECHO MEAS - RVOT DIAM: 2.3 CM
BH CV ECHO MEAS - SUP REN AO DIAM: 2.4 CM
BH CV ECHO MEAS - SV(LVOT): 78.5 ML
BH CV ECHO MEAS - SV(MOD-SP2): 62 ML
BH CV ECHO MEAS - SV(MOD-SP4): 56 ML
BH CV ECHO MEAS - SV(RVOT): 57 ML
BH CV ECHO MEAS - SVI(LVOT): 36.6 ML/M2
BH CV ECHO MEAS - SVI(MOD-SP2): 28.9 ML/M2
BH CV ECHO MEAS - SVI(MOD-SP4): 26.1 ML/M2
BH CV ECHO MEAS - TAPSE (>1.6): 2.22 CM
BH CV ECHO MEASUREMENTS AVERAGE E/E' RATIO: 8.88
BH CV XLRA - RV BASE: 2.9 CM
BH CV XLRA - RV LENGTH: 8.1 CM
BH CV XLRA - RV MID: 2.7 CM
BH CV XLRA - TDI S': 14.3 CM/SEC
LEFT ATRIUM VOLUME INDEX: 24.2 ML/M2
SINUS: 3.7 CM
STJ: 3.1 CM

## 2024-11-01 PROCEDURE — 99214 OFFICE O/P EST MOD 30 MIN: CPT | Performed by: NURSE PRACTITIONER

## 2024-11-01 PROCEDURE — 1159F MED LIST DOCD IN RCRD: CPT | Performed by: NURSE PRACTITIONER

## 2024-11-01 PROCEDURE — 93000 ELECTROCARDIOGRAM COMPLETE: CPT | Performed by: NURSE PRACTITIONER

## 2024-11-01 PROCEDURE — 93356 MYOCRD STRAIN IMG SPCKL TRCK: CPT

## 2024-11-01 PROCEDURE — 3074F SYST BP LT 130 MM HG: CPT | Performed by: NURSE PRACTITIONER

## 2024-11-01 PROCEDURE — 93306 TTE W/DOPPLER COMPLETE: CPT

## 2024-11-01 PROCEDURE — 3078F DIAST BP <80 MM HG: CPT | Performed by: NURSE PRACTITIONER

## 2024-11-01 PROCEDURE — 1160F RVW MEDS BY RX/DR IN RCRD: CPT | Performed by: NURSE PRACTITIONER

## 2024-11-01 NOTE — PROGRESS NOTES
Subjective:     Encounter Date:11/01/2024      Patient ID: Jose Luis Davis is a 74 y.o. male.    Chief Complaint: Follow-up aortic dilation  History of Present Illness  This is a 74-year-old man who follows with Dr. Castro and is new to me today.  He has a past medical history of hypertension, hyperlipidemia and tobacco abuse.  He saw Dr. Castro for the first time in October 2023 with complaints of shortness of breath.  He had actually been seen in the emergency room in September 2023 with exertional and resting dyspnea.  Workup in the ER was unremarkable.  He was sent home with the steroid taper.  He followed up with his PCP following that and a CT scan was recommended due to his history of smoking. This showed a few pulmonary nodules and coronary artery calcification. He was also noted to have a mildly dilated ascending aorta measuring 4.2 cm. Dr. Castro obtained an echocardiogram that showed a normal LV systolic function, EF 63.7%, normal diastolic function, and ascending aorta measuring 3.9 cm. A holter monitor was placed that was unremarkable. He also underwent a stress test that showed no evidence of ischemia.    He is here today for a follow up visit. He reports that he is feeling well. His shortness of breath has resolved. He is pretty active and exercises. He says he is not a couch potato by any means. He reports good BP control at home. He will check it a few times a month at home. He denies any chest pain, palpitations, dizziness or syncope. No swelling in his legs, orthopnea or PND. He is having issues with his right hip and may need hip replacement soon. He follows with Dr. Palacios regarding this.    I have reviewed and updated as appropriate allergies, current medications, past family history, past medical history, past surgical history and problem list.    Review of Systems   Constitutional: Negative for fever, malaise/fatigue, weight gain and weight loss.   HENT:  Negative for congestion, hoarse  voice and sore throat.    Eyes:  Negative for blurred vision and double vision.   Cardiovascular:  Negative for chest pain, dyspnea on exertion, leg swelling, orthopnea, palpitations and syncope.   Respiratory:  Negative for cough, shortness of breath and wheezing.    Gastrointestinal:  Negative for abdominal pain, hematemesis, hematochezia and melena.   Genitourinary:  Negative for dysuria and hematuria.   Neurological:  Negative for dizziness, headaches, light-headedness and numbness.   Psychiatric/Behavioral:  Negative for depression. The patient is not nervous/anxious.          Current Outpatient Medications:     APPLE CIDER VINEGAR PO, Take  by mouth Daily. Liquid blend, Disp: , Rfl:     Coenzyme Q10 (CO Q 10 PO), Take 200 mg by mouth Daily., Disp: , Rfl:     glucosamine sulfate 500 MG capsule capsule, Take 1 capsule by mouth 3 (Three) Times a Day With Meals. EZ move, Disp: , Rfl:     Magnesium 400 MG tablet, Take 400 mg by mouth Daily., Disp: , Rfl:     Multiple Vitamins-Minerals (MULTIVITAMIN ADULT PO), Take 1 tablet/day by mouth., Disp: , Rfl:     rosuvastatin (CRESTOR) 40 MG tablet, Take 1 tablet by mouth Daily., Disp: 90 tablet, Rfl: 2    Saw Palmetto 450 MG capsule, Take  by mouth 2 (Two) Times a Day., Disp: , Rfl:     Past Medical History:   Diagnosis Date    Essential hypertension 4/28/2017    Kidney stone     Osteoarthritis of multiple joints 4/28/2017       Past Surgical History:   Procedure Laterality Date    CYSTOSCOPY URETEROSCOPY LASER LITHOTRIPSY Left 3/12/2016    Procedure: Cyto, Trans urethral left ureterral orifice incision, left ureteroscopy, holmium laser lithotripsy, basket extraction of stone fragment, placement of left double J ureteral stent. ;  Surgeon: Nathan Freeman Jr., MD;  Location: [facility] OR;  Service:        History reviewed. No pertinent family history.    Social History     Tobacco Use    Smoking status: Former     Passive exposure: Never    Smokeless tobacco: Never     "Tobacco comments:     quit about 30 yrs ago   Vaping Use    Vaping status: Never Used   Substance Use Topics    Alcohol use: Yes     Alcohol/week: 6.0 standard drinks of alcohol     Types: 6 Cans of beer per week    Drug use: No         ECG 12 Lead    Date/Time: 11/1/2024 12:09 PM  Performed by: Mirella Turner APRN    Authorized by: Mirella Turner APRN  Comparison: compared with previous ECG from 10/19/2023  Similar to previous ECG  Rhythm: sinus rhythm             Objective:     Visit Vitals  /70 (BP Location: Right arm, Patient Position: Sitting, Cuff Size: Adult)   Pulse 69   Ht 182.9 cm (72\")   Wt 92.1 kg (203 lb)   SpO2 98%   BMI 27.53 kg/m²             Physical Exam  Constitutional:       Appearance: Normal appearance. He is normal weight.   HENT:      Head: Normocephalic.   Neck:      Vascular: No carotid bruit.   Cardiovascular:      Rate and Rhythm: Normal rate and regular rhythm.      Chest Wall: PMI is not displaced.      Pulses: Normal pulses.           Radial pulses are 2+ on the right side and 2+ on the left side.        Posterior tibial pulses are 2+ on the right side and 2+ on the left side.      Heart sounds: Normal heart sounds. No murmur heard.     No friction rub. No gallop.   Pulmonary:      Effort: Pulmonary effort is normal.      Breath sounds: Normal breath sounds.   Abdominal:      General: Bowel sounds are normal. There is no distension.      Palpations: Abdomen is soft.   Musculoskeletal:      Right lower leg: No edema.      Left lower leg: No edema.   Skin:     General: Skin is warm and dry.      Capillary Refill: Capillary refill takes less than 2 seconds.   Neurological:      Mental Status: He is alert and oriented to person, place, and time.   Psychiatric:         Mood and Affect: Mood normal.         Behavior: Behavior normal.         Thought Content: Thought content normal.          Lab Review:   Lipid Panel          4/5/2024    10:34 10/7/2024    10:55   Lipid Panel   Total " Cholesterol 109  133    Triglycerides 72  67    HDL Cholesterol 43  49    VLDL Cholesterol 15  14    LDL Cholesterol  51  70          Cardiac Procedures:       Assessment:         Diagnoses and all orders for this visit:    1. Essential hypertension (Primary)    2. Dyslipidemia            Plan:       SOA/dyspnea on exertion: this has resolved. He still does not have any answers as to why he was short of breath when all the testing was completed, but his workup was unremarkable.   Aortic dilation: stable on echocardiogram today at 3.9 cm and aortic root measures 4.1 cm. Discussed with patient.  Will continue with routine surveillance.  HLD: on statin therapy.     Thank you for allowing me to participate in this patient's care. Please call with any questions or concerns. Mr. Davis will follow up with Dr. Castro in 1 year.          Your medication list            Accurate as of November 1, 2024  9:05 AM. If you have any questions, ask your nurse or doctor.                CONTINUE taking these medications        Instructions Last Dose Given Next Dose Due   APPLE CIDER VINEGAR PO      Take  by mouth Daily. Liquid blend       CO Q 10 PO      Take 200 mg by mouth Daily.       glucosamine sulfate 500 MG capsule capsule      Take 1 capsule by mouth 3 (Three) Times a Day With Meals. EZ move       Magnesium 400 MG tablet      Take 400 mg by mouth Daily.       multivitamin with minerals tablet tablet      Take 1 tablet/day by mouth.       rosuvastatin 40 MG tablet  Commonly known as: CRESTOR      Take 1 tablet by mouth Daily.       Saw Palmetto 450 MG capsule      Take  by mouth 2 (Two) Times a Day.                  NICHOLAS Velasco  11/01/24  9:05 AM EDT

## 2024-12-14 ENCOUNTER — HOSPITAL ENCOUNTER (EMERGENCY)
Facility: HOSPITAL | Age: 74
Discharge: HOME | End: 2024-12-14
Attending: EMERGENCY MEDICINE
Payer: MEDICARE

## 2024-12-14 ENCOUNTER — APPOINTMENT (OUTPATIENT)
Dept: CT IMAGING | Facility: HOSPITAL | Age: 74
End: 2024-12-14
Payer: MEDICARE

## 2024-12-14 VITALS
HEIGHT: 72 IN | DIASTOLIC BLOOD PRESSURE: 78 MMHG | WEIGHT: 201.5 LBS | SYSTOLIC BLOOD PRESSURE: 144 MMHG | RESPIRATION RATE: 18 BRPM | BODY MASS INDEX: 27.29 KG/M2 | HEART RATE: 81 BPM | OXYGEN SATURATION: 97 % | TEMPERATURE: 98.2 F

## 2024-12-14 DIAGNOSIS — K57.90 DIVERTICULOSIS: ICD-10-CM

## 2024-12-14 DIAGNOSIS — N40.0 BENIGN PROSTATIC HYPERPLASIA, UNSPECIFIED WHETHER LOWER URINARY TRACT SYMPTOMS PRESENT: ICD-10-CM

## 2024-12-14 DIAGNOSIS — N23 RENAL COLIC ON RIGHT SIDE: ICD-10-CM

## 2024-12-14 DIAGNOSIS — N20.0 KIDNEY STONE: Primary | ICD-10-CM

## 2024-12-14 LAB
ALBUMIN SERPL-MCNC: 4.1 G/DL (ref 3.5–5.2)
ALBUMIN/GLOB SERPL: 1.3 G/DL
ALP SERPL-CCNC: 83 U/L (ref 39–117)
ALT SERPL W P-5'-P-CCNC: 33 U/L (ref 1–41)
ANION GAP SERPL CALCULATED.3IONS-SCNC: 13 MMOL/L (ref 5–15)
AST SERPL-CCNC: 26 U/L (ref 1–40)
BACTERIA UR QL AUTO: ABNORMAL /HPF
BASOPHILS # BLD AUTO: 0.04 10*3/MM3 (ref 0–0.2)
BASOPHILS NFR BLD AUTO: 0.6 % (ref 0–1.5)
BILIRUB SERPL-MCNC: 0.7 MG/DL (ref 0–1.2)
BILIRUB UR QL STRIP: NEGATIVE
BUN SERPL-MCNC: 18 MG/DL (ref 8–23)
BUN/CREAT SERPL: 19.1 (ref 7–25)
CALCIUM SPEC-SCNC: 9.6 MG/DL (ref 8.6–10.5)
CHLORIDE SERPL-SCNC: 108 MMOL/L (ref 98–107)
CLARITY UR: CLEAR
CO2 SERPL-SCNC: 21 MMOL/L (ref 22–29)
COLOR UR: ABNORMAL
CREAT SERPL-MCNC: 0.94 MG/DL (ref 0.76–1.27)
DEPRECATED RDW RBC AUTO: 41.7 FL (ref 37–54)
EGFRCR SERPLBLD CKD-EPI 2021: 85.6 ML/MIN/1.73
EOSINOPHIL # BLD AUTO: 0.18 10*3/MM3 (ref 0–0.4)
EOSINOPHIL NFR BLD AUTO: 2.6 % (ref 0.3–6.2)
ERYTHROCYTE [DISTWIDTH] IN BLOOD BY AUTOMATED COUNT: 12 % (ref 12.3–15.4)
GLOBULIN UR ELPH-MCNC: 3.2 GM/DL
GLUCOSE SERPL-MCNC: 88 MG/DL (ref 65–99)
GLUCOSE UR STRIP-MCNC: NEGATIVE MG/DL
HCT VFR BLD AUTO: 42.2 % (ref 37.5–51)
HGB BLD-MCNC: 14.7 G/DL (ref 13–17.7)
HGB UR QL STRIP.AUTO: NEGATIVE
IMM GRANULOCYTES # BLD AUTO: 0.01 10*3/MM3 (ref 0–0.05)
IMM GRANULOCYTES NFR BLD AUTO: 0.1 % (ref 0–0.5)
KETONES UR QL STRIP: ABNORMAL
LEUKOCYTE ESTERASE UR QL STRIP.AUTO: ABNORMAL
LIPASE SERPL-CCNC: 225 U/L (ref 13–60)
LYMPHOCYTES # BLD AUTO: 2.42 10*3/MM3 (ref 0.7–3.1)
LYMPHOCYTES NFR BLD AUTO: 35.1 % (ref 19.6–45.3)
MCH RBC QN AUTO: 32.6 PG (ref 26.6–33)
MCHC RBC AUTO-ENTMCNC: 34.8 G/DL (ref 31.5–35.7)
MCV RBC AUTO: 93.6 FL (ref 79–97)
MONOCYTES # BLD AUTO: 0.47 10*3/MM3 (ref 0.1–0.9)
MONOCYTES NFR BLD AUTO: 6.8 % (ref 5–12)
NEUTROPHILS NFR BLD AUTO: 3.77 10*3/MM3 (ref 1.7–7)
NEUTROPHILS NFR BLD AUTO: 54.8 % (ref 42.7–76)
NITRITE UR QL STRIP: NEGATIVE
NRBC BLD AUTO-RTO: 0 /100 WBC (ref 0–0.2)
PH UR STRIP.AUTO: 6.5 [PH] (ref 5–8)
PLATELET # BLD AUTO: 266 10*3/MM3 (ref 140–450)
PMV BLD AUTO: 9.9 FL (ref 6–12)
POTASSIUM SERPL-SCNC: 3.9 MMOL/L (ref 3.5–5.2)
PROT SERPL-MCNC: 7.3 G/DL (ref 6–8.5)
PROT UR QL STRIP: NEGATIVE
RBC # BLD AUTO: 4.51 10*6/MM3 (ref 4.14–5.8)
RBC # UR STRIP: ABNORMAL /HPF
REF LAB TEST METHOD: ABNORMAL
SODIUM SERPL-SCNC: 142 MMOL/L (ref 136–145)
SP GR UR STRIP: 1.02 (ref 1–1.03)
SQUAMOUS #/AREA URNS HPF: ABNORMAL /HPF
UROBILINOGEN UR QL STRIP: ABNORMAL
WBC # UR STRIP: ABNORMAL /HPF
WBC NRBC COR # BLD AUTO: 6.89 10*3/MM3 (ref 3.4–10.8)

## 2024-12-14 PROCEDURE — 96374 THER/PROPH/DIAG INJ IV PUSH: CPT

## 2024-12-14 PROCEDURE — 25010000002 MORPHINE PER 10 MG: Performed by: EMERGENCY MEDICINE

## 2024-12-14 PROCEDURE — 25010000002 KETOROLAC TROMETHAMINE PER 15 MG: Performed by: EMERGENCY MEDICINE

## 2024-12-14 PROCEDURE — 99284 EMERGENCY DEPT VISIT MOD MDM: CPT

## 2024-12-14 PROCEDURE — 83690 ASSAY OF LIPASE: CPT | Performed by: EMERGENCY MEDICINE

## 2024-12-14 PROCEDURE — 36415 COLL VENOUS BLD VENIPUNCTURE: CPT

## 2024-12-14 PROCEDURE — 74176 CT ABD & PELVIS W/O CONTRAST: CPT

## 2024-12-14 PROCEDURE — 85025 COMPLETE CBC W/AUTO DIFF WBC: CPT | Performed by: EMERGENCY MEDICINE

## 2024-12-14 PROCEDURE — 25010000002 ONDANSETRON PER 1 MG: Performed by: EMERGENCY MEDICINE

## 2024-12-14 PROCEDURE — 96375 TX/PRO/DX INJ NEW DRUG ADDON: CPT

## 2024-12-14 PROCEDURE — 80053 COMPREHEN METABOLIC PANEL: CPT | Performed by: EMERGENCY MEDICINE

## 2024-12-14 PROCEDURE — 81001 URINALYSIS AUTO W/SCOPE: CPT | Performed by: EMERGENCY MEDICINE

## 2024-12-14 RX ORDER — KETOROLAC TROMETHAMINE 15 MG/ML
15 INJECTION, SOLUTION INTRAMUSCULAR; INTRAVENOUS ONCE
Status: COMPLETED | OUTPATIENT
Start: 2024-12-14 | End: 2024-12-14

## 2024-12-14 RX ORDER — MORPHINE SULFATE 2 MG/ML
4 INJECTION, SOLUTION INTRAMUSCULAR; INTRAVENOUS ONCE AS NEEDED
Status: COMPLETED | OUTPATIENT
Start: 2024-12-14 | End: 2024-12-14

## 2024-12-14 RX ORDER — ONDANSETRON 2 MG/ML
8 INJECTION INTRAMUSCULAR; INTRAVENOUS ONCE
Status: COMPLETED | OUTPATIENT
Start: 2024-12-14 | End: 2024-12-14

## 2024-12-14 RX ADMIN — KETOROLAC TROMETHAMINE 15 MG: 15 INJECTION, SOLUTION INTRAMUSCULAR; INTRAVENOUS at 15:06

## 2024-12-14 RX ADMIN — MORPHINE SULFATE 4 MG: 2 INJECTION, SOLUTION INTRAMUSCULAR; INTRAVENOUS at 14:14

## 2024-12-14 RX ADMIN — ONDANSETRON 8 MG: 2 INJECTION INTRAMUSCULAR; INTRAVENOUS at 14:15

## 2024-12-14 NOTE — ED PROVIDER NOTES
EMERGENCY DEPARTMENT ENCOUNTER    Room number:  19/19   Date Seen:  12/15/2024  PCP:  Shanae Dash APRN    Patient presented to the emergency department with acute right flank pain, history of kidney stones, felt similar.    Laboratory Results:  Recent Results (from the past 24 hours)   CBC Auto Differential    Collection Time: 12/14/24  2:16 PM    Specimen: Blood   Result Value Ref Range    WBC 6.89 3.40 - 10.80 10*3/mm3    RBC 4.51 4.14 - 5.80 10*6/mm3    Hemoglobin 14.7 13.0 - 17.7 g/dL    Hematocrit 42.2 37.5 - 51.0 %    MCV 93.6 79.0 - 97.0 fL    MCH 32.6 26.6 - 33.0 pg    MCHC 34.8 31.5 - 35.7 g/dL    RDW 12.0 (L) 12.3 - 15.4 %    RDW-SD 41.7 37.0 - 54.0 fl    MPV 9.9 6.0 - 12.0 fL    Platelets 266 140 - 450 10*3/mm3    Neutrophil % 54.8 42.7 - 76.0 %    Lymphocyte % 35.1 19.6 - 45.3 %    Monocyte % 6.8 5.0 - 12.0 %    Eosinophil % 2.6 0.3 - 6.2 %    Basophil % 0.6 0.0 - 1.5 %    Immature Grans % 0.1 0.0 - 0.5 %    Neutrophils, Absolute 3.77 1.70 - 7.00 10*3/mm3    Lymphocytes, Absolute 2.42 0.70 - 3.10 10*3/mm3    Monocytes, Absolute 0.47 0.10 - 0.90 10*3/mm3    Eosinophils, Absolute 0.18 0.00 - 0.40 10*3/mm3    Basophils, Absolute 0.04 0.00 - 0.20 10*3/mm3    Immature Grans, Absolute 0.01 0.00 - 0.05 10*3/mm3    nRBC 0.0 0.0 - 0.2 /100 WBC   Comprehensive Metabolic Panel    Collection Time: 12/14/24  3:13 PM    Specimen: Hand, Right; Blood   Result Value Ref Range    Glucose 88 65 - 99 mg/dL    BUN 18 8 - 23 mg/dL    Creatinine 0.94 0.76 - 1.27 mg/dL    Sodium 142 136 - 145 mmol/L    Potassium 3.9 3.5 - 5.2 mmol/L    Chloride 108 (H) 98 - 107 mmol/L    CO2 21.0 (L) 22.0 - 29.0 mmol/L    Calcium 9.6 8.6 - 10.5 mg/dL    Total Protein 7.3 6.0 - 8.5 g/dL    Albumin 4.1 3.5 - 5.2 g/dL    ALT (SGPT) 33 1 - 41 U/L    AST (SGOT) 26 1 - 40 U/L    Alkaline Phosphatase 83 39 - 117 U/L    Total Bilirubin 0.7 0.0 - 1.2 mg/dL    Globulin 3.2 gm/dL    A/G Ratio 1.3 g/dL    BUN/Creatinine Ratio 19.1 7.0 - 25.0     Anion Gap 13.0 5.0 - 15.0 mmol/L    eGFR 85.6 >60.0 mL/min/1.73   Lipase    Collection Time: 12/14/24  3:13 PM    Specimen: Hand, Right; Blood   Result Value Ref Range    Lipase 225 (H) 13 - 60 U/L   Urinalysis With Microscopic If Indicated (No Culture) - Urine, Clean Catch    Collection Time: 12/14/24  4:25 PM    Specimen: Urine, Clean Catch   Result Value Ref Range    Color, UA Dark Yellow (A) Yellow, Straw    Appearance, UA Clear Clear    pH, UA 6.5 5.0 - 8.0    Specific Gravity, UA 1.021 1.005 - 1.030    Glucose, UA Negative Negative    Ketones, UA Trace (A) Negative    Bilirubin, UA Negative Negative    Blood, UA Negative Negative    Protein, UA Negative Negative    Leuk Esterase, UA Trace (A) Negative    Nitrite, UA Negative Negative    Urobilinogen, UA 0.2 E.U./dL 0.2 - 1.0 E.U./dL   Urinalysis, Microscopic Only - Urine, Clean Catch    Collection Time: 12/14/24  4:25 PM    Specimen: Urine, Clean Catch   Result Value Ref Range    RBC, UA 0-2 None Seen, 0-2 /HPF    WBC, UA 3-5 (A) None Seen, 0-2 /HPF    Bacteria, UA None Seen None Seen /HPF    Squamous Epithelial Cells, UA 0-2 None Seen, 0-2 /HPF    Methodology Manual Light Microscopy      I reviewed the above results.    Radiology:  CT Abdomen Pelvis Stone Protocol    Result Date: 12/14/2024  CT ABDOMEN PELVIS STONE PROTOCOL-  INDICATIONS: Flank pain, kidney stone suspected  TECHNIQUE: Radiation dose reduction techniques were utilized, including automated exposure control and exposure modulation based on body size. Unenhanced ABDOMEN AND PELVIS CT  COMPARISON: 3/10/2016  FINDINGS:  A 7 mm stone in the urinary bladder is noted near the right vesicoureteral junction. Mild right hydronephrosis and hydroureter are evident. Punctate nonobstructive right nephrolithiasis. Small nonobstructive left nephrolithiasis. No left hydronephrosis. Left renal cyst is seen.  The prostate is enlarged, 6.5 cm, previously 5.8 cm.  Otherwise unremarkable appearance of the liver,  gallbladder, spleen, adrenal glands, pancreas, kidneys, bladder.  No bowel obstruction or abnormal bowel thickening is identified. The appendix does not appear inflamed. Colonic diverticula are seen that do not appear inflamed. Small hiatal hernia.  No free intraperitoneal gas or free fluid. Right more than left inguinal hernias of fat are apparent.  Scattered small mesenteric and para-aortic lymph nodes are seen that are not significant by size criteria.  Abdominal aorta is not aneurysmal. Aortic and other arterial calcifications are present.  The lung bases show small atelectasis/scarring. In the right middle lobe at the minor fissure on axial image 8, a 5-6 mm nodule appears stable. On image 5, a 7 mm nodule at the minor fissure appears stable. On image 2, a 4 mm nodule appears stable from 6/13/2024.  Degenerative changes are seen in the spine. No acute fracture is identified.         A 7 mm stone in the urinary bladder near the right vesicoureteral junction. Mild right hydronephrosis and hydroureter. Punctate nonobstructive right nephrolithiasis. Small nonobstructive left nephrolithiasis. Enlarged prostate.  This report was finalized on 12/14/2024 4:28 PM by Dr. Codey Jolley M.D on Workstation: [employer]     I reviewed the above results    Medications ordered in ED:  Medications   ondansetron (ZOFRAN) injection 8 mg (8 mg Intravenous Given 12/14/24 1415)   morphine injection 4 mg (4 mg Intravenous Given 12/14/24 1414)   ketorolac (TORADOL) injection 15 mg (15 mg Intravenous Given 12/14/24 1506)       Progress and Consult Notes:  ED Course as of 12/15/24 0739   Sat Dec 14, 2024   1359 My differential diagnosis for abdominal pain for a male includes but is not limited to:  Gastritis, gastroenteritis, peptic ulcer disease, GERD, esophageal perforation, acute appendicitis, mesenteric adenitis, Meckel's diverticulum, epiploic appendagitis, diverticulitis, colon cancer, ulcerative colitis, Crohn's disease,  intussusception, small bowel obstruction, adhesions, ischemic bowel, perforated viscus, ileus, obstipation, biliary colic, cholecystitis, cholelithiasis, hepatitis, pancreatitis, common bile duct obstruction, cholangitis, bile leak, splenic trauma, splenic rupture, splenic infarction, splenic abscess, abdominal abscess, ascites, spontaneous bacterial peritonitis, hernia, UTI, cystitis, prostatitis, ureterolithiasis, urinary obstruction, testicular torsion, AAA, myocardial infarction, pneumonia, cancer, porphyria, DKA, medications, sickle cell, viral syndrome, zoster   [JG]   1456 I reviewed CT abdomen pelvis in PACS, patient appears to either have a right UVJ stone or bladder stone per my read. [JG]   1658 Nitrite, UA: Negative [KA]   1658 Protein, UA: Negative [KA]   1658 Blood, UA: Negative [KA]   1658 Bilirubin, UA: Negative [KA]   1742 I reassessed the patient.  Counseled him on all of his lab and imaging results.  He does have an elevated lipase but on repeat exam he has no epigastric tenderness and no signs of pancreatitis on CT imaging.  This can be further or repeat evaluated as needed if symptoms develop on an outpatient basis.  He has normal LFTs and a normal white blood cell count.  No urinary infection, does have evidence of a 7 mm kidney stone in the bladder.  On reassessment his pain is resolved and he feels back to normal.  He does have significant BPH.  May have some difficulty passing the stone from the bladder.  Recommended close follow-up with his urologist next week.  He is agreeable with this plan, return precautions discussed.  We did discuss all incidental findings including but not limited to findings of diverticulosis without diverticulitis, stable lung nodules for which he has annual CT scans and next 1 is due in January, BPH. [KA]      ED Course User Index  [JG] Russell Bruner MD  [KA] Viviana Connors PA-C             Diagnosis:  Final diagnoses:   Kidney stone   Renal colic on right  side   Benign prostatic hyperplasia, unspecified whether lower urinary tract symptoms present   Diverticulosis       Follow Up:  [facility]  [address] [facility] [id number] [phone]  Schedule an appointment as soon as possible for a visit in 2 days      [facility] EMERGENCY DEPARTMENT  [address]  [facility] [address] [phone]    If symptoms worsen or any concerns      RX:     Medication List      No changes were made to your prescriptions during this visit.         Provider attestation:  I personally reviewed the past medical history, past surgical history, social history, family history, current medications, and allergies as they appear in the chart.             Viviana Connors PA-C  12/15/24 0792

## 2024-12-14 NOTE — ED PROVIDER NOTES
EMERGENCY DEPARTMENT ENCOUNTER  Room Number:  19/19  Date of encounter:  12/15/2024  PCP: Shanae Dash APRN  Patient Care Team:  Shanae Dash APRN as PCP - General (Nurse Practitioner)  Nathna Freeman Jr., MD as Consulting Physician (Urology)  Melanie Castro MD as Consulting Physician (Cardiology)  Cari Alegre Jr., MD as Consulting Physician (Vascular Surgery)     HPI:  Context: Jose Luis Davis is a 74 y.o. male who presents to the ED c/o chief complaint of right flank pain.  Patient complains of right flank pain rating to his right lower quadrant began at noon today, pain is sharp and stabbing, waxes and wanes but never resolves.  Patient reports nausea, denies any emesis.  No hematuria, patient does report dysuria, no increase in frequency or urgency, no difficulty urinating.  No fever shakes chills or night sweats, normal bowel functions.  Patient reports similar pain in the past with kidney stones, reports 4 episodes in the past, has required surgical procedure for kidney stones, [facility].    MEDICAL HISTORY REVIEW  Reviewed in EPIC    PAST MEDICAL HISTORY  Active Ambulatory Problems     Diagnosis Date Noted    Ureterolithiasis 03/10/2016    Benign prostatic hyperplasia with urinary hesitancy 04/28/2017    Essential hypertension 04/28/2017    Dyslipidemia 04/28/2017    Osteoarthritis of multiple joints 04/28/2017    Tension type headache 06/20/2018    Migraine without aura 12/06/2018    Dizziness 04/08/2023    Benign paroxysmal positional vertigo 04/13/2023    History of tobacco abuse 10/03/2023    Elevated PSA 10/03/2023    Need for immunization against influenza 10/03/2023    Dyspnea 10/03/2023    Medicare annual wellness visit, subsequent 10/03/2023    Chest pain, atypical 10/19/2023    Pulmonary nodule 04/05/2024    Seasonal allergies 04/05/2024    Lumbar back pain 04/05/2024    Right hip pain 04/05/2024     Resolved Ambulatory Problems     Diagnosis Date Noted     No Resolved Ambulatory Problems     Past Medical History:   Diagnosis Date    Kidney stone        PAST SURGICAL HISTORY  Past Surgical History:   Procedure Laterality Date    CYSTOSCOPY URETEROSCOPY LASER LITHOTRIPSY Left 3/12/2016    Procedure: Cyto, Trans urethral left ureterral orifice incision, left ureteroscopy, holmium laser lithotripsy, basket extraction of stone fragment, placement of left double J ureteral stent. ;  Surgeon: Nathan Freeman Jr., MD;  Location: [facility];  Service:        FAMILY HISTORY  History reviewed. No pertinent family history.    SOCIAL HISTORY  Social History     Socioeconomic History    Marital status:    Tobacco Use    Smoking status: Former     Passive exposure: Never    Smokeless tobacco: Never    Tobacco comments:     quit about 30 yrs ago   Vaping Use    Vaping status: Never Used   Substance and Sexual Activity    Alcohol use: Yes     Alcohol/week: 6.0 standard drinks of alcohol     Types: 6 Cans of beer per week    Drug use: No    Sexual activity: Not Currently     Partners: Female       ALLERGIES  Patient has no known allergies.    The patient's allergies have been reviewed    REVIEW OF SYSTEMS  All systems reviewed and negative except for those discussed in HPI.     PHYSICAL EXAM  I have reviewed the triage vital signs and nursing notes.  ED Triage Vitals [12/14/24 1356]   Temp Heart Rate Resp BP SpO2   98.2 °F (36.8 °C) 85 22 (!) 151/101 98 %      Temp src Heart Rate Source Patient Position BP Location FiO2 (%)   Tympanic Monitor Sitting Left arm --       General: Appears uncomfortable  HENT: NCAT, PERRL, Nares patent.  Eyes: no scleral icterus.  Neck: trachea midline, no ROM limitations.  CV: regular rhythm, regular rate.  Respiratory: normal effort, CTAB.  Abdomen: soft, nondistended, NTTP, no rebound tenderness, no guarding or rigidity.  Musculoskeletal: no deformity.  Neuro: alert, moves all extremities, follows commands.  Skin: warm, dry.    LAB  RESULTS  No results found for this or any previous visit (from the past 24 hours).    I ordered the above labs and reviewed the results.    RADIOLOGY  No Radiology Exams Resulted Within Past 24 Hours    I ordered the above noted radiological studies. I reviewed the images and results. I agree with the radiologist interpretation.    PROCEDURES  Procedures    MEDICATIONS GIVEN IN ER  Medications   ondansetron (ZOFRAN) injection 8 mg (8 mg Intravenous Given 12/14/24 1415)   morphine injection 4 mg (4 mg Intravenous Given 12/14/24 1414)   ketorolac (TORADOL) injection 15 mg (15 mg Intravenous Given 12/14/24 1506)       PROGRESS, DATA ANALYSIS, CONSULTS, AND MEDICAL DECISION MAKING  A complete history and physical exam have been performed.  All available laboratory and imaging results have been reviewed by myself prior to disposition.    MDM    After the initial H&P, I discussed pertinent information from history and physical exam with patient/family.  Discussed differential diagnosis.  Discussed plan for ED evaluation/workup/treatment.  All questions answered.  Patient/family is agreeable with plan.  ED Course as of 12/15/24 1936   Sat Dec 14, 2024   1359 My differential diagnosis for abdominal pain for a male includes but is not limited to:  Gastritis, gastroenteritis, peptic ulcer disease, GERD, esophageal perforation, acute appendicitis, mesenteric adenitis, Meckel's diverticulum, epiploic appendagitis, diverticulitis, colon cancer, ulcerative colitis, Crohn's disease, intussusception, small bowel obstruction, adhesions, ischemic bowel, perforated viscus, ileus, obstipation, biliary colic, cholecystitis, cholelithiasis, hepatitis, pancreatitis, common bile duct obstruction, cholangitis, bile leak, splenic trauma, splenic rupture, splenic infarction, splenic abscess, abdominal abscess, ascites, spontaneous bacterial peritonitis, hernia, UTI, cystitis, prostatitis, ureterolithiasis, urinary obstruction, testicular  torsion, AAA, myocardial infarction, pneumonia, cancer, porphyria, DKA, medications, sickle cell, viral syndrome, zoster   [JG]   1456 I reviewed CT abdomen pelvis in PACS, patient appears to either have a right UVJ stone or bladder stone per my read. [JG]   1658 Nitrite, UA: Negative [KA]   1658 Protein, UA: Negative [KA]   1658 Blood, UA: Negative [KA]   1658 Bilirubin, UA: Negative [KA]   1742 I reassessed the patient.  Counseled him on all of his lab and imaging results.  He does have an elevated lipase but on repeat exam he has no epigastric tenderness and no signs of pancreatitis on CT imaging.  This can be further or repeat evaluated as needed if symptoms develop on an outpatient basis.  He has normal LFTs and a normal white blood cell count.  No urinary infection, does have evidence of a 7 mm kidney stone in the bladder.  On reassessment his pain is resolved and he feels back to normal.  He does have significant BPH.  May have some difficulty passing the stone from the bladder.  Recommended close follow-up with his urologist next week.  He is agreeable with this plan, return precautions discussed.  We did discuss all incidental findings including but not limited to findings of diverticulosis without diverticulitis, stable lung nodules for which he has annual CT scans and next 1 is due in January, BPH. [KA]      ED Course User Index  [JG] Russell Bruner MD  [KA] Viviana Connors PA-C       AS OF 19:36 EST VITALS:    BP - 144/78  HR - 81  TEMP - 98.2 °F (36.8 °C) (Tympanic)  O2 SATS - 97%    DIAGNOSIS  Final diagnoses:   Kidney stone   Renal colic on right side   Benign prostatic hyperplasia, unspecified whether lower urinary tract symptoms present   Diverticulosis         DISPOSITION  DISCHARGE    Patient discharged in stable condition.    Reviewed implications of results, diagnosis, meds, responsibility to follow up, warning signs and symptoms of possible worsening, potential complications and reasons to  return to ER.    Patient/Family voiced understanding of above instructions.    Discussed plan for discharge, as there is no emergent indication for admission. Patient referred to primary care provider for BP management due to today's BP. Pt/family is agreeable and understands need for follow up and repeat testing.  Pt is aware that discharge does not mean that nothing is wrong but it indicates no emergency is present that requires admission and they must continue care with follow-up as given below or physician of their choice.     FOLLOW-UP  [facility]  [address] [facility] [id number] [phone]  Schedule an appointment as soon as possible for a visit in 2 days      [facility] EMERGENCY DEPARTMENT  [address] [facility] [address] [phone]    If symptoms worsen or any concerns         Medication List      No changes were made to your prescriptions during this visit.            Russell Bruner MD  12/15/24 7225

## 2024-12-17 ENCOUNTER — PATIENT OUTREACH (OUTPATIENT)
Dept: CASE MANAGEMENT | Facility: OTHER | Age: 74
End: 2024-12-17
Payer: MEDICARE

## 2024-12-17 NOTE — OUTREACH NOTE
AMBULATORY CASE MANAGEMENT NOTE    Names and Relationships of Patient/Support Persons: Contact: Jose Luis Davis; Relationship: Self -     Patient Outreach    Outgoing call to the patient for ED follow up.  Introduced self and explained role and purpose of outreach.  He has a follow up with [facility] tomorrow.  He denies questions related to his visit.  Discussed HRCM program and services. He is not interested and declines at this time.       Jacklyn VALDES  Ambulatory Case Management    12/17/2024, 16:37 EST

## 2025-01-07 NOTE — PAT
AT call complete. Education provided to the patient on the following:    - Nothing to eat after midnight the night before your procedure, water and black coffee okay up to 2 hours before arrival time.  - If diabetic and procedure is after noon: No food 8 hours prior to arrival time, and only then only clear liquids 2 hours before arrival time.   - You will need to have someone drive you home after your procedure and remain with you for 24 hours after. The  will need to remain on site during your visit.  - Please remove all jewelry, including body piercing's, and leave any valuables at home. Only bring your drivers license and insurance card on day of procedure.  - Do not wear contact lenses; wear glasses and bring your case.  - Wash with antibacterial soap (such as Dial) the night before and morning of procedure.  - Be prepared to provide your last dose of all home medications.  - Coffee and vending available on the 1st and 5th floors; no cafeteria on site.  - You will need to arrive at 0800 on 1/14/25 at [facility] located at [address] [facility]. We are located on the 5th floor.  - Please be aware that arrival times may be subject to change up until the day of surgery.   - Feel free to contact us at: [phone] with any additional questions/concerns.

## 2025-01-08 DIAGNOSIS — Z87.891 HISTORY OF TOBACCO ABUSE: ICD-10-CM

## 2025-01-08 DIAGNOSIS — R91.1 PULMONARY NODULE: Primary | ICD-10-CM

## 2025-01-08 DIAGNOSIS — Z87.891 HISTORY OF TOBACCO ABUSE: Primary | ICD-10-CM

## 2025-01-14 ENCOUNTER — HOSPITAL ENCOUNTER (OUTPATIENT)
Age: 75
Setting detail: HOSPITAL OUTPATIENT SURGERY
Discharge: HOME | End: 2025-01-14
Attending: UROLOGY | Admitting: UROLOGY
Payer: MEDICARE

## 2025-01-14 ENCOUNTER — ANESTHESIA EVENT (OUTPATIENT)
Age: 75
End: 2025-01-14
Payer: MEDICARE

## 2025-01-14 ENCOUNTER — ANESTHESIA (OUTPATIENT)
Age: 75
End: 2025-01-14
Payer: MEDICARE

## 2025-01-14 ENCOUNTER — APPOINTMENT (OUTPATIENT)
Age: 75
End: 2025-01-14
Payer: MEDICARE

## 2025-01-14 VITALS
OXYGEN SATURATION: 100 % | HEIGHT: 72 IN | HEART RATE: 61 BPM | TEMPERATURE: 97.9 F | RESPIRATION RATE: 16 BRPM | DIASTOLIC BLOOD PRESSURE: 89 MMHG | WEIGHT: 201.4 LBS | SYSTOLIC BLOOD PRESSURE: 147 MMHG | BODY MASS INDEX: 27.28 KG/M2

## 2025-01-14 DIAGNOSIS — N20.1 URETERAL CALCULI: ICD-10-CM

## 2025-01-14 PROCEDURE — C1769 GUIDE WIRE: HCPCS | Performed by: UROLOGY

## 2025-01-14 PROCEDURE — 25010000002 PROPOFOL 10 MG/ML EMULSION

## 2025-01-14 PROCEDURE — C2617 STENT, NON-COR, TEM W/O DEL: HCPCS | Performed by: UROLOGY

## 2025-01-14 PROCEDURE — 82365 CALCULUS SPECTROSCOPY: CPT | Performed by: UROLOGY

## 2025-01-14 PROCEDURE — 25010000002 KETOROLAC TROMETHAMINE PER 15 MG

## 2025-01-14 PROCEDURE — C1889 IMPLANT/INSERT DEVICE, NOC: HCPCS | Performed by: UROLOGY

## 2025-01-14 PROCEDURE — 76000 FLUOROSCOPY <1 HR PHYS/QHP: CPT

## 2025-01-14 PROCEDURE — 25010000002 ONDANSETRON PER 1 MG

## 2025-01-14 PROCEDURE — 52356 CYSTO/URETERO W/LITHOTRIPSY: CPT | Performed by: UROLOGY

## 2025-01-14 PROCEDURE — 25010000002 LIDOCAINE 2% SOLUTION

## 2025-01-14 PROCEDURE — 25010000002 DEXAMETHASONE SODIUM PHOSPHATE 20 MG/5ML SOLUTION

## 2025-01-14 PROCEDURE — 25810000003 LACTATED RINGERS PER 1000 ML: Performed by: ANESTHESIOLOGY

## 2025-01-14 DEVICE — URETERAL STENT
Type: IMPLANTABLE DEVICE | Site: URETER | Status: FUNCTIONAL
Brand: CONTOUR™

## 2025-01-14 RX ORDER — SULFAMETHOXAZOLE AND TRIMETHOPRIM 800; 160 MG/1; MG/1
1 TABLET ORAL 2 TIMES DAILY
Qty: 10 TABLET | Refills: 0 | Status: SHIPPED | OUTPATIENT
Start: 2025-01-14

## 2025-01-14 RX ORDER — HYDRALAZINE HYDROCHLORIDE 20 MG/ML
5 INJECTION INTRAMUSCULAR; INTRAVENOUS
Status: DISCONTINUED | OUTPATIENT
Start: 2025-01-14 | End: 2025-01-14 | Stop reason: HOSPADM

## 2025-01-14 RX ORDER — OXYCODONE AND ACETAMINOPHEN 7.5; 325 MG/1; MG/1
1 TABLET ORAL EVERY 4 HOURS PRN
Status: DISCONTINUED | OUTPATIENT
Start: 2025-01-14 | End: 2025-01-14 | Stop reason: HOSPADM

## 2025-01-14 RX ORDER — FENTANYL CITRATE 50 UG/ML
50 INJECTION, SOLUTION INTRAMUSCULAR; INTRAVENOUS ONCE AS NEEDED
Status: DISCONTINUED | OUTPATIENT
Start: 2025-01-14 | End: 2025-01-14 | Stop reason: HOSPADM

## 2025-01-14 RX ORDER — PROMETHAZINE HYDROCHLORIDE 25 MG/1
25 SUPPOSITORY RECTAL ONCE AS NEEDED
Status: DISCONTINUED | OUTPATIENT
Start: 2025-01-14 | End: 2025-01-14 | Stop reason: HOSPADM

## 2025-01-14 RX ORDER — PROPOFOL 10 MG/ML
VIAL (ML) INTRAVENOUS AS NEEDED
Status: DISCONTINUED | OUTPATIENT
Start: 2025-01-14 | End: 2025-01-14 | Stop reason: SURG

## 2025-01-14 RX ORDER — LIDOCAINE HYDROCHLORIDE 20 MG/ML
JELLY TOPICAL AS NEEDED
Status: DISCONTINUED | OUTPATIENT
Start: 2025-01-14 | End: 2025-01-14 | Stop reason: HOSPADM

## 2025-01-14 RX ORDER — DEXAMETHASONE SODIUM PHOSPHATE 4 MG/ML
INJECTION, SOLUTION INTRA-ARTICULAR; INTRALESIONAL; INTRAMUSCULAR; INTRAVENOUS; SOFT TISSUE AS NEEDED
Status: DISCONTINUED | OUTPATIENT
Start: 2025-01-14 | End: 2025-01-14 | Stop reason: SURG

## 2025-01-14 RX ORDER — LIDOCAINE HYDROCHLORIDE 20 MG/ML
INJECTION, SOLUTION INFILTRATION; PERINEURAL AS NEEDED
Status: DISCONTINUED | OUTPATIENT
Start: 2025-01-14 | End: 2025-01-14 | Stop reason: SURG

## 2025-01-14 RX ORDER — KETOROLAC TROMETHAMINE 30 MG/ML
INJECTION, SOLUTION INTRAMUSCULAR; INTRAVENOUS AS NEEDED
Status: DISCONTINUED | OUTPATIENT
Start: 2025-01-14 | End: 2025-01-14 | Stop reason: SURG

## 2025-01-14 RX ORDER — HYDROCODONE BITARTRATE AND ACETAMINOPHEN 5; 325 MG/1; MG/1
1 TABLET ORAL ONCE AS NEEDED
Status: DISCONTINUED | OUTPATIENT
Start: 2025-01-14 | End: 2025-01-14 | Stop reason: HOSPADM

## 2025-01-14 RX ORDER — DIPHENHYDRAMINE HYDROCHLORIDE 50 MG/ML
12.5 INJECTION INTRAMUSCULAR; INTRAVENOUS
Status: DISCONTINUED | OUTPATIENT
Start: 2025-01-14 | End: 2025-01-14 | Stop reason: HOSPADM

## 2025-01-14 RX ORDER — ONDANSETRON 2 MG/ML
4 INJECTION INTRAMUSCULAR; INTRAVENOUS ONCE AS NEEDED
Status: DISCONTINUED | OUTPATIENT
Start: 2025-01-14 | End: 2025-01-14 | Stop reason: HOSPADM

## 2025-01-14 RX ORDER — SODIUM CHLORIDE 0.9 % (FLUSH) 0.9 %
3-10 SYRINGE (ML) INJECTION AS NEEDED
Status: DISCONTINUED | OUTPATIENT
Start: 2025-01-14 | End: 2025-01-14 | Stop reason: HOSPADM

## 2025-01-14 RX ORDER — FENTANYL CITRATE 50 UG/ML
50 INJECTION, SOLUTION INTRAMUSCULAR; INTRAVENOUS
Status: DISCONTINUED | OUTPATIENT
Start: 2025-01-14 | End: 2025-01-14 | Stop reason: HOSPADM

## 2025-01-14 RX ORDER — PROMETHAZINE HYDROCHLORIDE 12.5 MG/1
25 TABLET ORAL ONCE AS NEEDED
Status: DISCONTINUED | OUTPATIENT
Start: 2025-01-14 | End: 2025-01-14 | Stop reason: HOSPADM

## 2025-01-14 RX ORDER — HYDROMORPHONE HYDROCHLORIDE 1 MG/ML
0.5 INJECTION, SOLUTION INTRAMUSCULAR; INTRAVENOUS; SUBCUTANEOUS
Status: DISCONTINUED | OUTPATIENT
Start: 2025-01-14 | End: 2025-01-14 | Stop reason: HOSPADM

## 2025-01-14 RX ORDER — HYDROCODONE BITARTRATE AND ACETAMINOPHEN 5; 325 MG/1; MG/1
1 TABLET ORAL EVERY 6 HOURS PRN
Qty: 20 TABLET | Refills: 0 | Status: SHIPPED | OUTPATIENT
Start: 2025-01-14

## 2025-01-14 RX ORDER — CEFAZOLIN SODIUM 1 G/3ML
INJECTION, POWDER, FOR SOLUTION INTRAMUSCULAR; INTRAVENOUS AS NEEDED
Status: DISCONTINUED | OUTPATIENT
Start: 2025-01-14 | End: 2025-01-14 | Stop reason: HOSPADM

## 2025-01-14 RX ORDER — PHENAZOPYRIDINE HYDROCHLORIDE 200 MG/1
200 TABLET, FILM COATED ORAL 3 TIMES DAILY PRN
Qty: 30 TABLET | Refills: 0 | Status: SHIPPED | OUTPATIENT
Start: 2025-01-14

## 2025-01-14 RX ORDER — SODIUM CHLORIDE, SODIUM LACTATE, POTASSIUM CHLORIDE, CALCIUM CHLORIDE 600; 310; 30; 20 MG/100ML; MG/100ML; MG/100ML; MG/100ML
9 INJECTION, SOLUTION INTRAVENOUS CONTINUOUS
Status: DISCONTINUED | OUTPATIENT
Start: 2025-01-14 | End: 2025-01-14 | Stop reason: HOSPADM

## 2025-01-14 RX ORDER — NALOXONE HCL 0.4 MG/ML
0.2 VIAL (ML) INJECTION AS NEEDED
Status: DISCONTINUED | OUTPATIENT
Start: 2025-01-14 | End: 2025-01-14 | Stop reason: HOSPADM

## 2025-01-14 RX ORDER — FAMOTIDINE 10 MG/ML
20 INJECTION, SOLUTION INTRAVENOUS ONCE
Status: COMPLETED | OUTPATIENT
Start: 2025-01-14 | End: 2025-01-14

## 2025-01-14 RX ORDER — ONDANSETRON 2 MG/ML
INJECTION INTRAMUSCULAR; INTRAVENOUS AS NEEDED
Status: DISCONTINUED | OUTPATIENT
Start: 2025-01-14 | End: 2025-01-14 | Stop reason: SURG

## 2025-01-14 RX ORDER — LABETALOL HYDROCHLORIDE 5 MG/ML
5 INJECTION, SOLUTION INTRAVENOUS
Status: DISCONTINUED | OUTPATIENT
Start: 2025-01-14 | End: 2025-01-14 | Stop reason: HOSPADM

## 2025-01-14 RX ORDER — FLUMAZENIL 0.1 MG/ML
0.2 INJECTION INTRAVENOUS AS NEEDED
Status: DISCONTINUED | OUTPATIENT
Start: 2025-01-14 | End: 2025-01-14 | Stop reason: HOSPADM

## 2025-01-14 RX ORDER — ATROPINE SULFATE 0.4 MG/ML
0.4 INJECTION, SOLUTION INTRAMUSCULAR; INTRAVENOUS; SUBCUTANEOUS ONCE AS NEEDED
Status: DISCONTINUED | OUTPATIENT
Start: 2025-01-14 | End: 2025-01-14 | Stop reason: HOSPADM

## 2025-01-14 RX ORDER — MIDAZOLAM HYDROCHLORIDE 1 MG/ML
0.5 INJECTION, SOLUTION INTRAMUSCULAR; INTRAVENOUS
Status: DISCONTINUED | OUTPATIENT
Start: 2025-01-14 | End: 2025-01-14 | Stop reason: HOSPADM

## 2025-01-14 RX ORDER — DROPERIDOL 2.5 MG/ML
0.62 INJECTION, SOLUTION INTRAMUSCULAR; INTRAVENOUS
Status: DISCONTINUED | OUTPATIENT
Start: 2025-01-14 | End: 2025-01-14 | Stop reason: HOSPADM

## 2025-01-14 RX ORDER — SODIUM CHLORIDE 0.9 % (FLUSH) 0.9 %
3 SYRINGE (ML) INJECTION EVERY 12 HOURS SCHEDULED
Status: DISCONTINUED | OUTPATIENT
Start: 2025-01-14 | End: 2025-01-14 | Stop reason: HOSPADM

## 2025-01-14 RX ADMIN — LIDOCAINE HYDROCHLORIDE 60 MG: 20 INJECTION, SOLUTION INFILTRATION; PERINEURAL at 08:05

## 2025-01-14 RX ADMIN — PROPOFOL 160 MG: 10 INJECTION, EMULSION INTRAVENOUS at 08:05

## 2025-01-14 RX ADMIN — KETOROLAC TROMETHAMINE 15 MG: 30 INJECTION, SOLUTION INTRAMUSCULAR at 08:25

## 2025-01-14 RX ADMIN — PROPOFOL 40 MG: 10 INJECTION, EMULSION INTRAVENOUS at 08:06

## 2025-01-14 RX ADMIN — SODIUM CHLORIDE, POTASSIUM CHLORIDE, SODIUM LACTATE AND CALCIUM CHLORIDE 9 ML/HR: 600; 310; 30; 20 INJECTION, SOLUTION INTRAVENOUS at 07:15

## 2025-01-14 RX ADMIN — ONDANSETRON 4 MG: 2 INJECTION INTRAMUSCULAR; INTRAVENOUS at 08:11

## 2025-01-14 RX ADMIN — DEXAMETHASONE SODIUM PHOSPHATE 4 MG: 4 INJECTION, SOLUTION INTRAMUSCULAR; INTRAVENOUS at 08:11

## 2025-01-14 RX ADMIN — FAMOTIDINE 20 MG: 10 INJECTION, SOLUTION INTRAVENOUS at 07:20

## 2025-01-14 NOTE — H&P
[facility] CONSULT      Patient Identification:  NAME:  Jose Luis Davis  Age:  74 y.o.   Sex:  male   :  1950   MRN:  [id number]     Chief complaint: Ureteral stone.    History of present illness:      History of 7 mm distal right ureteral stone.  Patient presents today for right ureteroscopy stent placement.      Past medical history:  Past Medical History:   Diagnosis Date    Essential hypertension 2017    white coat syndrome    HLD (hyperlipidemia)     Kidney stone     Osteoarthritis of multiple joints 2017       Past surgical history:  Past Surgical History:   Procedure Laterality Date    CYSTOSCOPY URETEROSCOPY LASER LITHOTRIPSY Left 3/12/2016    Procedure: Cyto, Trans urethral left ureterral orifice incision, left ureteroscopy, holmium laser lithotripsy, basket extraction of stone fragment, placement of left double J ureteral stent. ;  Surgeon: Nathan Freeman Jr., MD;  Location: [facility];  Service:        Allergies:  Patient has no known allergies.    Home medications:  Medications Prior to Admission   Medication Sig Dispense Refill Last Dose/Taking    APPLE CIDER VINEGAR PO Take  by mouth Daily. Liquid blend   Past Week    Coenzyme Q10 (CO Q 10 PO) Take 200 mg by mouth Daily.   Past Week    glucosamine sulfate 500 MG capsule capsule Take 1 capsule by mouth 3 (Three) Times a Day With Meals. EZ move   Past Week    Magnesium 400 MG tablet Take 400 mg by mouth Daily.   Past Week    Multiple Vitamins-Minerals (MULTIVITAMIN ADULT PO) Take 1 tablet/day by mouth.   Past Week    rosuvastatin (CRESTOR) 40 MG tablet Take 1 tablet by mouth Daily. 90 tablet 2 2025    Saw Palmetto 450 MG capsule Take  by mouth 2 (Two) Times a Day.   Past Week        Hospital medications:  sodium chloride, 3 mL, Intravenous, Q12H      lactated ringers, 9 mL/hr, Last Rate: 9 mL/hr (25 0715)        fentanyl    midazolam    sodium chloride    Family history:  History reviewed. No pertinent family  history.    Social history:  Social History     Tobacco Use    Smoking status: Former     Passive exposure: Never    Smokeless tobacco: Never    Tobacco comments:     quit about 30 yrs ago   Vaping Use    Vaping status: Never Used   Substance Use Topics    Alcohol use: Yes     Alcohol/week: 6.0 standard drinks of alcohol     Types: 6 Cans of beer per week    Drug use: No       Review of systems:      Positive for: Ureteral stone  Negative for:  chest pain, cough, sob, o/w neg    Objective:  TMax 24 hours:   Temp (24hrs), Av.1 °F (36.7 °C), Min:98.1 °F (36.7 °C), Max:98.1 °F (36.7 °C)      Vitals Ranges:   Temp:  [98.1 °F (36.7 °C)] 98.1 °F (36.7 °C)  Heart Rate:  [76] 76  Resp:  [18] 18  BP: (148)/(93) 148/93    Intake/Output Last 3 shifts:  No intake/output data recorded.     Physical Exam:    General Appearance:    Alert, cooperative, NAD   Back:     No CVA tenderness   Lungs:     Respirations unlabored, no wheezing    Heart:    RRR, intact peripheral pulses   Abdomen:     Soft, NDNT, no masses, no guarding   Neuro/Psych:   Orientation intact, mood/affect pleasant       Results review:   I reviewed the patient's new clinical results.    Data review:  Lab Results (last 24 hours)       ** No results found for the last 24 hours. **             Imaging:  Imaging Results (Last 24 Hours)       ** No results found for the last 24 hours. **               Assessment:     Right ureteral stone.    Plan:     Cystoscopy right ureteroscopy laser lithotripsy stone extraction right ureteral stent placement.  Risk and benefits were explained include but not limited to bleeding, infection, damage to kidney and ureter.  Patient consented to the procedure.    Acosta Gongora MD  25  07:49 EST

## 2025-01-14 NOTE — ANESTHESIA PREPROCEDURE EVALUATION
Anesthesia Evaluation     NPO Solid Status: > 8 hours             Airway   Mallampati: II  TM distance: >3 FB  Neck ROM: full  Dental - normal exam     Pulmonary    (+) ,wheezes  Cardiovascular     ECG reviewed  Rhythm: regular    (+) hypertension, hyperlipidemia    ROS comment: 2023 normal stress   2023 echo   Interpretation Summary       ·  Left ventricular systolic function is normal. Calculated left ventricular EF = 60.1%  ·  Left ventricular diastolic function was normal.  ·  Mild aortic and mitral regurgitation.  ·  Normal biatrial and IVC size.  ·  Mild dilation of the aortic root (4.1 cm) and of the ascending aorta (3.9 cm) is present when indexed by BSA.      Neuro/Psych  (+) headaches  GI/Hepatic/Renal/Endo    (+) renal disease- stones    Musculoskeletal     Abdominal    Substance History      OB/GYN          Other   arthritis,                       Anesthesia Plan    ASA 3     general     (  D/W R&B of GA including but not limited to: heart, lung, liver, kidney, neurologic problems, positioning injuries, dental damage, corneal abrasion and TMJ.  .)  intravenous induction     Anesthetic plan, risks, benefits, and alternatives have been provided, discussed and informed consent has been obtained with: patient.        CODE STATUS:

## 2025-01-14 NOTE — OP NOTE
PREOPERATIVE DIAGNOSIS: Right ureteral stone    POSTOPERATIVE DIAGNOSIS: Same    PROCEDURE: Cystoscopy right ureteroscopy laser lithotripsy stone extraction stent placement    SURGEON:  Acosta Gongora MD    ASSISTANT: None    ANESTHESIA: General    EBL: Minimal    DRAINS: 6 Marshallese by 26 cm double-J ureteral stent    COMPLICATIONS: None    FINDINGS: Distal right ureteral stone, BPH    INDICATIONS FOR PROCEDURE: History of 7 mm distal right ureteral stone.  Patient presents today for right ureteroscopy.    DESCRIPTION OF PROCEDURE: After receiving IV antibiotics patient was taken to the cystoscopy suite underwent a general anesthesia.  After adequate anesthesia was obtained he is placed in dorsolithotomy position.  His groins prepped and draped in a sterile fashion.  A 22 Marshallese cystoscope was introduced into the urethra and into the bladder.  The urethra was normal.  Prostate showed moderate to and severe enlargement.  No median lobe noted.  Once into the bladder the ureteral orifice ease were noted.  There is no masses or stones in the bladder.  A guidewire was placed into the right orifice and into the right renal pelvis.  I dilated the orifice to 10 Marshallese using loop glide dilators.  A semirigid ureteroscope was inserted into the ureter and the stone was encountered in the distal ureter.  A 365 holmium laser was then used to fragment the stone in multiple pieces.  A 0 tip basket was then used to remove the stone fragments and passed off table specimen.  A 22 Marshallese cystoscope was then reinserted in the bladder and placed a 6 Marshallese by 26 cm double-J ureteral stent over the guidewire and Seldinger technique.  There was good coil in the right renal pelvis which was confirmed by fluoroscopy and good coil in the bladder confirmed by cystoscopy.  The bladder was drained cystoscope was removed and string was taped to the penis.  He was extubated taken recovery in satisfactory condition.  He tolerated the procedure  well.

## 2025-01-14 NOTE — ANESTHESIA POSTPROCEDURE EVALUATION
"Patient: Jose Luis Davis    Procedure Summary       Date: 01/14/25 Room / Location: [facility] OR 01 / [facility] MAIN OR    Anesthesia Start: 0801 Anesthesia Stop: 0833    Procedure: RIGHT URETEROSCOPY LASER LITHOTRIPSY STONE BASKET EXTRACTION STENT PLACEMENT (Right) Diagnosis:     Surgeons: Acosta Gongora MD Provider: Kurt Crespo MD    Anesthesia Type: general ASA Status: 3            Anesthesia Type: general    Vitals  Vitals Value Taken Time   /79 01/14/25 0916   Temp 36.6 °C (97.9 °F) 01/14/25 0832   Pulse 61 01/14/25 0918   Resp 14 01/14/25 0900   SpO2 100 % 01/14/25 0918   Vitals shown include unfiled device data.        Post Anesthesia Care and Evaluation    Patient location during evaluation: bedside  Patient participation: complete - patient participated  Level of consciousness: awake and alert  Pain management: adequate    Airway patency: patent  Anesthetic complications: No anesthetic complications    Cardiovascular status: acceptable  Respiratory status: acceptable  Hydration status: acceptable    Comments: /83   Pulse 56   Temp 36.6 °C (97.9 °F) (Temporal)   Resp 14   Ht 182.9 cm (72\")   Wt 91.4 kg (201 lb 6.4 oz)   SpO2 100%   BMI 27.31 kg/m²     "

## 2025-01-20 LAB
CALCIUM OXALATE DIHYDRATE MFR STONE IR: 10 %
COLOR STONE: NORMAL
COM MFR STONE: 90 %
COMPN STONE: NORMAL
LABORATORY COMMENT REPORT: NORMAL
LABORATORY COMMENT REPORT: NORMAL
Lab: NORMAL
Lab: NORMAL
PHOTO: NORMAL
SIZE STONE: NORMAL MM
SPEC SOURCE SUBJ: NORMAL
WT STONE: 33 MG

## 2025-02-13 ENCOUNTER — OFFICE VISIT (OUTPATIENT)
Dept: ORTHOPEDIC SURGERY | Facility: CLINIC | Age: 75
End: 2025-02-13
Payer: MEDICARE

## 2025-02-13 VITALS — WEIGHT: 203.5 LBS | BODY MASS INDEX: 27.56 KG/M2 | HEIGHT: 72 IN

## 2025-02-13 DIAGNOSIS — M16.11 PRIMARY OSTEOARTHRITIS OF RIGHT HIP: Primary | ICD-10-CM

## 2025-02-13 DIAGNOSIS — R52 PAIN: ICD-10-CM

## 2025-02-13 DIAGNOSIS — M25.551 RIGHT HIP PAIN: ICD-10-CM

## 2025-02-13 PROCEDURE — 99214 OFFICE O/P EST MOD 30 MIN: CPT | Performed by: ORTHOPAEDIC SURGERY

## 2025-02-13 NOTE — PROGRESS NOTES
Patient: Jose Luis Davis  YOB: 1950 74 y.o. male  Medical Record Number: [id number]    Chief Complaint:   Chief Complaint   Patient presents with    Right Hip - Follow-up, Pain       History of Present Illness:Jose Luis Davis is a 74 y.o. male who presents for follow-up of right hip pain.  Patient seen previously diagnosed with hip arthritis.  States he is still been getting by that injection as noted previously was helpful.  He would really like to consider another 1.  He is not sure that he is ready for hip surgery but needs to do something given symptoms which are limiting at times he is had a makes modifications.    Allergies: No Known Allergies    Medications:   Current Outpatient Medications   Medication Sig Dispense Refill    APPLE CIDER VINEGAR PO Take  by mouth Daily. Liquid blend      Coenzyme Q10 (CO Q 10 PO) Take 200 mg by mouth Daily.      glucosamine sulfate 500 MG capsule capsule Take 1 capsule by mouth 3 (Three) Times a Day With Meals. EZ move      Magnesium 400 MG tablet Take 400 mg by mouth Daily.      Multiple Vitamins-Minerals (MULTIVITAMIN ADULT PO) Take 1 tablet/day by mouth.      rosuvastatin (CRESTOR) 40 MG tablet Take 1 tablet by mouth Daily. 90 tablet 2    Saw Palmetto 450 MG capsule Take  by mouth 2 (Two) Times a Day.      HYDROcodone-acetaminophen (Norco) 5-325 MG per tablet Take 1 tablet by mouth Every 6 (Six) Hours As Needed for Moderate Pain. (Patient not taking: Reported on 2/13/2025) 20 tablet 0    phenazopyridine (Pyridium) 200 MG tablet Take 1 tablet by mouth 3 (Three) Times a Day As Needed for Bladder Spasms. (Patient not taking: Reported on 2/13/2025) 30 tablet 0    sulfamethoxazole-trimethoprim (Bactrim DS) 800-160 MG per tablet Take 1 tablet by mouth 2 (Two) Times a Day. (Patient not taking: Reported on 2/13/2025) 10 tablet 0     No current facility-administered medications for this visit.         The following portions of the patient's history were reviewed  "and updated as appropriate: allergies, current medications, past family history, past medical history, past social history, past surgical history and problem list.    Review of Systems:   A 14-point review of systems was performed. All systems negative except pertinent positives/negative listed in HPI above    Physical Exam:   Vitals:    02/13/25 1001   TempSrc: Temporal   Weight: 92.3 kg (203 lb 8 oz)   Height: 182.9 cm (72.01\")   PainSc:   5   PainLoc: Hip       General: A and O x 3, ASA, NAD    SCLERAE:    Normal    DENTITION:   Normal      Right hip examined no changes.    Radiology:    Previous x-rays reviewed with the patient.    Patient also had a previous CT scan which was personally reviewed and discussed with the patient which shows severe degenerative changes of the hip with cystic change of the femoral head and acetabulum.      Assessment/Plan:  Right hip OA    I discussed treatment options the patient he still would like to put off any kind of surgery but is something he will think about.  At this time he would like to proceed with a hip injection we will make a referral back to our sports medicine colleague Dr. West for this.  Like to have the patient follow-up in 3 months.  Continue conservative treatment.  If any issues during interim he will let us know.      Warren Palacios MD  2/13/2025   "

## 2025-03-05 ENCOUNTER — OFFICE VISIT (OUTPATIENT)
Age: 75
End: 2025-03-05
Payer: MEDICARE

## 2025-03-05 VITALS
HEART RATE: 84 BPM | HEIGHT: 72 IN | BODY MASS INDEX: 27.6 KG/M2 | TEMPERATURE: 98.4 F | DIASTOLIC BLOOD PRESSURE: 100 MMHG | SYSTOLIC BLOOD PRESSURE: 160 MMHG | OXYGEN SATURATION: 97 % | WEIGHT: 203.8 LBS

## 2025-03-05 DIAGNOSIS — M16.11 PRIMARY OSTEOARTHRITIS OF RIGHT HIP: Primary | ICD-10-CM

## 2025-03-05 RX ORDER — METHYLPREDNISOLONE ACETATE 80 MG/ML
80 INJECTION, SUSPENSION INTRA-ARTICULAR; INTRALESIONAL; INTRAMUSCULAR; SOFT TISSUE
Status: COMPLETED | OUTPATIENT
Start: 2025-03-05 | End: 2025-03-05

## 2025-03-05 RX ORDER — LIDOCAINE HYDROCHLORIDE 10 MG/ML
2 INJECTION, SOLUTION EPIDURAL; INFILTRATION; INTRACAUDAL; PERINEURAL
Status: COMPLETED | OUTPATIENT
Start: 2025-03-05 | End: 2025-03-05

## 2025-03-05 RX ADMIN — METHYLPREDNISOLONE ACETATE 80 MG: 80 INJECTION, SUSPENSION INTRA-ARTICULAR; INTRALESIONAL; INTRAMUSCULAR; SOFT TISSUE at 09:35

## 2025-03-05 RX ADMIN — LIDOCAINE HYDROCHLORIDE 2 ML: 10 INJECTION, SOLUTION EPIDURAL; INFILTRATION; INTRACAUDAL; PERINEURAL at 09:35

## 2025-03-05 NOTE — PROGRESS NOTES
"Chief Complaint   Patient presents with    Right Hip - Initial Evaluation       History of Present Illness  Jose Luis is a 74 y.o. year old male patient of Dr. Palacios with previously diagnosed osteoarthritis here today for intra-articular cortisone injection. Most recent injection on 5/9/24 provided good relief for roughly 3 months. Followed-up with Dr. Palacios on 2/13/25. No new injuries or complaints.    The following data was reviewed by: Barbara West DO on 05/09/2024:  Data reviewed : Dr. Palacios note and images from 2/13/25      /100 (BP Location: Left arm)   Pulse 84   Temp 98.4 °F (36.9 °C)   Ht 182.9 cm (72\")   Wt 92.4 kg (203 lb 12.8 oz)   SpO2 97%   BMI 27.64 kg/m²        Physical Exam  Vital signs reviewed.   General: Well developed, well nourished; No acute distress.  Eyes: conjunctiva clear; pupils equally round and reactive  ENT: external ears and nose atraumatic; hearing normal  CV: extremity warm and well perfused  Resp: normal respiratory effort, no use of accessory muscles  Skin: normal color and pigmentation; no rashes or wounds; normal turgor  Psych: alert and oriented; mood and affect appropriate; recent and remote memory intact    - Large Joint Arthrocentesis: R hip joint on 3/5/2025 9:35 AM  Indications: pain  Details: 22 G (3.5\") needle, ultrasound-guided anterior approach  Medications: 80 mg methylPREDNISolone acetate 80 MG/ML; 2 mL lidocaine PF 1% 1 %  Outcome: tolerated well, no immediate complications  Procedure, treatment alternatives, risks and benefits explained, specific risks discussed. Consent was given by the patient. Immediately prior to procedure a time out was called to verify the correct patient, procedure, equipment, support staff and site/side marked as required. Patient was prepped and draped in the usual sterile fashion.         Assessment and Plan  Diagnoses and all orders for this visit:    1. Primary osteoarthritis of right hip (Primary)  -     - Large " Joint Arthrocentesis      The procedure was well tolerated. He has been educated on the signs and symptoms of local reaction and infection and should call the office if he experiences any of these. He should take it easy for the next 24 to 48 hours and ice as needed. After that, he may return to normal activity. It was recommended to continue low impact activity. He may continue with previously recommended supportive measures. Will follow-up with Dr. Palacios as needed. All of his questions were answered and he is agreeable with the plan.    Dictated utilizing Dragon dictation.

## 2025-03-17 ENCOUNTER — HOSPITAL ENCOUNTER (OUTPATIENT)
Facility: HOSPITAL | Age: 75
Discharge: HOME | End: 2025-03-17
Admitting: NURSE PRACTITIONER
Payer: MEDICARE

## 2025-03-17 DIAGNOSIS — R91.1 PULMONARY NODULE: ICD-10-CM

## 2025-03-17 DIAGNOSIS — Z87.891 HISTORY OF TOBACCO ABUSE: ICD-10-CM

## 2025-03-17 PROCEDURE — 71271 CT THORAX LUNG CANCER SCR C-: CPT

## 2025-03-27 ENCOUNTER — APPOINTMENT (OUTPATIENT)
Dept: CT IMAGING | Facility: HOSPITAL | Age: 75
End: 2025-03-27
Payer: MEDICARE

## 2025-03-27 ENCOUNTER — HOSPITAL ENCOUNTER (EMERGENCY)
Facility: HOSPITAL | Age: 75
Discharge: HOME | End: 2025-03-27
Attending: EMERGENCY MEDICINE
Payer: MEDICARE

## 2025-03-27 VITALS
HEIGHT: 72 IN | HEART RATE: 69 BPM | TEMPERATURE: 97.9 F | SYSTOLIC BLOOD PRESSURE: 142 MMHG | OXYGEN SATURATION: 97 % | BODY MASS INDEX: 27.36 KG/M2 | RESPIRATION RATE: 18 BRPM | WEIGHT: 202 LBS | DIASTOLIC BLOOD PRESSURE: 83 MMHG

## 2025-03-27 DIAGNOSIS — R51.9 NONINTRACTABLE EPISODIC HEADACHE, UNSPECIFIED HEADACHE TYPE: Primary | ICD-10-CM

## 2025-03-27 DIAGNOSIS — R42 DIZZINESS: ICD-10-CM

## 2025-03-27 LAB
ALBUMIN SERPL-MCNC: 4.6 G/DL (ref 3.5–5.2)
ALBUMIN/GLOB SERPL: 1.4 G/DL
ALP SERPL-CCNC: 93 U/L (ref 39–117)
ALT SERPL W P-5'-P-CCNC: 35 U/L (ref 1–41)
ANION GAP SERPL CALCULATED.3IONS-SCNC: 12 MMOL/L (ref 5–15)
AST SERPL-CCNC: 29 U/L (ref 1–40)
BASOPHILS # BLD AUTO: 0.03 10*3/MM3 (ref 0–0.2)
BASOPHILS NFR BLD AUTO: 0.5 % (ref 0–1.5)
BILIRUB SERPL-MCNC: 0.7 MG/DL (ref 0–1.2)
BUN SERPL-MCNC: 22 MG/DL (ref 8–23)
BUN/CREAT SERPL: 25.3 (ref 7–25)
CALCIUM SPEC-SCNC: 9.6 MG/DL (ref 8.6–10.5)
CHLORIDE SERPL-SCNC: 106 MMOL/L (ref 98–107)
CO2 SERPL-SCNC: 24 MMOL/L (ref 22–29)
CREAT SERPL-MCNC: 0.87 MG/DL (ref 0.76–1.27)
DEPRECATED RDW RBC AUTO: 42 FL (ref 37–54)
EGFRCR SERPLBLD CKD-EPI 2021: 91.1 ML/MIN/1.73
EOSINOPHIL # BLD AUTO: 0.1 10*3/MM3 (ref 0–0.4)
EOSINOPHIL NFR BLD AUTO: 1.8 % (ref 0.3–6.2)
ERYTHROCYTE [DISTWIDTH] IN BLOOD BY AUTOMATED COUNT: 12.1 % (ref 12.3–15.4)
GEN 5 1HR TROPONIN T REFLEX: 8 NG/L
GLOBULIN UR ELPH-MCNC: 3.2 GM/DL
GLUCOSE SERPL-MCNC: 105 MG/DL (ref 65–99)
HCT VFR BLD AUTO: 40.1 % (ref 37.5–51)
HGB BLD-MCNC: 13.9 G/DL (ref 13–17.7)
IMM GRANULOCYTES # BLD AUTO: 0.01 10*3/MM3 (ref 0–0.05)
IMM GRANULOCYTES NFR BLD AUTO: 0.2 % (ref 0–0.5)
LYMPHOCYTES # BLD AUTO: 1.98 10*3/MM3 (ref 0.7–3.1)
LYMPHOCYTES NFR BLD AUTO: 35.2 % (ref 19.6–45.3)
MCH RBC QN AUTO: 32.6 PG (ref 26.6–33)
MCHC RBC AUTO-ENTMCNC: 34.7 G/DL (ref 31.5–35.7)
MCV RBC AUTO: 93.9 FL (ref 79–97)
MONOCYTES # BLD AUTO: 0.52 10*3/MM3 (ref 0.1–0.9)
MONOCYTES NFR BLD AUTO: 9.2 % (ref 5–12)
NEUTROPHILS NFR BLD AUTO: 2.99 10*3/MM3 (ref 1.7–7)
NEUTROPHILS NFR BLD AUTO: 53.1 % (ref 42.7–76)
NRBC BLD AUTO-RTO: 0 /100 WBC (ref 0–0.2)
PLATELET # BLD AUTO: 248 10*3/MM3 (ref 140–450)
PMV BLD AUTO: 10.1 FL (ref 6–12)
POTASSIUM SERPL-SCNC: 4 MMOL/L (ref 3.5–5.2)
PROT SERPL-MCNC: 7.8 G/DL (ref 6–8.5)
QT INTERVAL: 416 MS
QTC INTERVAL: 461 MS
RBC # BLD AUTO: 4.27 10*6/MM3 (ref 4.14–5.8)
SODIUM SERPL-SCNC: 142 MMOL/L (ref 136–145)
TROPONIN T NUMERIC DELTA: 0 NG/L
TROPONIN T SERPL HS-MCNC: 8 NG/L
WBC NRBC COR # BLD AUTO: 5.63 10*3/MM3 (ref 3.4–10.8)

## 2025-03-27 PROCEDURE — 93005 ELECTROCARDIOGRAM TRACING: CPT | Performed by: PHYSICIAN ASSISTANT

## 2025-03-27 PROCEDURE — 25510000001 IOPAMIDOL PER 1 ML: Performed by: EMERGENCY MEDICINE

## 2025-03-27 PROCEDURE — 84484 ASSAY OF TROPONIN QUANT: CPT | Performed by: PHYSICIAN ASSISTANT

## 2025-03-27 PROCEDURE — 93010 ELECTROCARDIOGRAM REPORT: CPT | Performed by: INTERNAL MEDICINE

## 2025-03-27 PROCEDURE — 85025 COMPLETE CBC W/AUTO DIFF WBC: CPT | Performed by: PHYSICIAN ASSISTANT

## 2025-03-27 PROCEDURE — 99285 EMERGENCY DEPT VISIT HI MDM: CPT

## 2025-03-27 PROCEDURE — 80053 COMPREHEN METABOLIC PANEL: CPT | Performed by: PHYSICIAN ASSISTANT

## 2025-03-27 PROCEDURE — 70496 CT ANGIOGRAPHY HEAD: CPT

## 2025-03-27 PROCEDURE — 36415 COLL VENOUS BLD VENIPUNCTURE: CPT

## 2025-03-27 PROCEDURE — 70498 CT ANGIOGRAPHY NECK: CPT

## 2025-03-27 RX ORDER — SODIUM CHLORIDE 0.9 % (FLUSH) 0.9 %
10 SYRINGE (ML) INJECTION AS NEEDED
Status: DISCONTINUED | OUTPATIENT
Start: 2025-03-27 | End: 2025-03-27 | Stop reason: HOSPADM

## 2025-03-27 RX ORDER — IOPAMIDOL 755 MG/ML
100 INJECTION, SOLUTION INTRAVASCULAR
Status: COMPLETED | OUTPATIENT
Start: 2025-03-27 | End: 2025-03-27

## 2025-03-27 RX ADMIN — IOPAMIDOL 90 ML: 755 INJECTION, SOLUTION INTRAVENOUS at 07:58

## 2025-03-27 NOTE — ED PROVIDER NOTES
MD ATTESTATION NOTE    SHARED VISIT: This visit was performed by BOTH a physician and an APC. The substantive portion of the medical decision making was performed by this attesting physician who made or approved the management plan and takes responsibility for patient management. All studies documented in the APC note (if performed) were independently interpreted by me.    The AMY and I have discussed this patient's history, physical exam, MDM, and treatment plan.  I have reviewed the documentation and personally had a face to face interaction with the patient. The attached note describes my personal findings.      Jose Luis Davis is a 74 y.o. male who presents to the ED c/o acute headache and dizziness.  Onset of symptoms intermittently for the past year.  He states that his symptoms are worse when he puts head on as he can feel the band on his hat.  Headache is occipital in nature in particular.  He also reports having dizziness that is worsened when he stands up or when he bends over in the shower.    On exam:  GENERAL: not distressed  HENT: nares patent  EYES: no scleral icterus  CV: regular rhythm, regular rate  RESPIRATORY: normal effort, clear to auscultation bilaterally  ABDOMEN: soft, nontender  MUSCULOSKELETAL: no deformity  NEURO:   Recent and remote memory functions are normal. The patient is attentive with normal concentration. Language is fluent. Speech is clear. The speech is non-dysarthric. Fund of knowledge is normal.   Symmetric smile with no facial droop.  Eyes close shut strongly bilaterally.  Symmetric eyebrow raise bilaterally.  EOMI, PERRL  CN II-XII grossly normal otherwise.  5/5 strength to extremities.  No pronator drift.  Intact FNF.  No meningismus.  SKIN: warm, dry    Labs  Recent Results (from the past 24 hours)   ECG 12 Lead Other; dizziness    Collection Time: 03/27/25  6:30 AM   Result Value Ref Range    QT Interval 416 ms    QTC Interval 461 ms   Comprehensive Metabolic Panel     Collection Time: 03/27/25  6:37 AM    Specimen: Blood   Result Value Ref Range    Glucose 105 (H) 65 - 99 mg/dL    BUN 22 8 - 23 mg/dL    Creatinine 0.87 0.76 - 1.27 mg/dL    Sodium 142 136 - 145 mmol/L    Potassium 4.0 3.5 - 5.2 mmol/L    Chloride 106 98 - 107 mmol/L    CO2 24.0 22.0 - 29.0 mmol/L    Calcium 9.6 8.6 - 10.5 mg/dL    Total Protein 7.8 6.0 - 8.5 g/dL    Albumin 4.6 3.5 - 5.2 g/dL    ALT (SGPT) 35 1 - 41 U/L    AST (SGOT) 29 1 - 40 U/L    Alkaline Phosphatase 93 39 - 117 U/L    Total Bilirubin 0.7 0.0 - 1.2 mg/dL    Globulin 3.2 gm/dL    A/G Ratio 1.4 g/dL    BUN/Creatinine Ratio 25.3 (H) 7.0 - 25.0    Anion Gap 12.0 5.0 - 15.0 mmol/L    eGFR 91.1 >60.0 mL/min/1.73   High Sensitivity Troponin T    Collection Time: 03/27/25  6:37 AM    Specimen: Blood   Result Value Ref Range    HS Troponin T 8 <22 ng/L   CBC Auto Differential    Collection Time: 03/27/25  6:37 AM    Specimen: Blood   Result Value Ref Range    WBC 5.63 3.40 - 10.80 10*3/mm3    RBC 4.27 4.14 - 5.80 10*6/mm3    Hemoglobin 13.9 13.0 - 17.7 g/dL    Hematocrit 40.1 37.5 - 51.0 %    MCV 93.9 79.0 - 97.0 fL    MCH 32.6 26.6 - 33.0 pg    MCHC 34.7 31.5 - 35.7 g/dL    RDW 12.1 (L) 12.3 - 15.4 %    RDW-SD 42.0 37.0 - 54.0 fl    MPV 10.1 6.0 - 12.0 fL    Platelets 248 140 - 450 10*3/mm3    Neutrophil % 53.1 42.7 - 76.0 %    Lymphocyte % 35.2 19.6 - 45.3 %    Monocyte % 9.2 5.0 - 12.0 %    Eosinophil % 1.8 0.3 - 6.2 %    Basophil % 0.5 0.0 - 1.5 %    Immature Grans % 0.2 0.0 - 0.5 %    Neutrophils, Absolute 2.99 1.70 - 7.00 10*3/mm3    Lymphocytes, Absolute 1.98 0.70 - 3.10 10*3/mm3    Monocytes, Absolute 0.52 0.10 - 0.90 10*3/mm3    Eosinophils, Absolute 0.10 0.00 - 0.40 10*3/mm3    Basophils, Absolute 0.03 0.00 - 0.20 10*3/mm3    Immature Grans, Absolute 0.01 0.00 - 0.05 10*3/mm3    nRBC 0.0 0.0 - 0.2 /100 WBC   High Sensitivity Troponin T 1Hr    Collection Time: 03/27/25  7:41 AM    Specimen: Blood   Result Value Ref Range    HS Troponin T 8 <22  ng/L    Troponin T Numeric Delta 0 Abnormal if >/=3 ng/L       Radiology  No Radiology Exams Resulted Within Past 24 Hours    Medications given in the ED:  Medications   sodium chloride 0.9 % flush 10 mL (has no administration in time range)   iopamidol (ISOVUE-370) 76 % injection 100 mL (90 mL Intravenous Given by Other 3/27/25 3112)       Orders placed during this visit:  Orders Placed This Encounter   Procedures    CT Angiogram Head    CT Angiogram Neck    Comprehensive Metabolic Panel    High Sensitivity Troponin T    CBC Auto Differential    High Sensitivity Troponin T 1Hr    ECG 12 Lead Other; dizziness    Insert Peripheral IV    CBC & Differential       Medical Decision Making:  ED Course as of 03/27/25 1409   Thu Mar 27, 2025   0615 Patient presents with a several week history of intermittent global headache as well as positional dizziness.  No true vertiginous dizziness.  Nonfocal neuroexam.    Patient does have a history of some mild carotid vascular disease which she is followed by Dr. Alegre.  Will obtain CTA imaging of the head and neck. [EE]   0731 WBC: 5.63 [EE]   0731 Hemoglobin: 13.9 [EE]   0845 CT imaging of the brain independently interpreted myself shows no evidence of acute hemorrhage. [EE]   0909 Troponin T Numeric Delta: 0 [EE]   0932 Updated patient on workup.  He is neurologically intact.  No clear etiology to his symptoms.  He does have severe cervical degenerative changes which may be partly inflamed.  We will refer to neurology.  Excedrin has been helping the patient's symptoms. [EE]      ED Course User Index  [EE] Brenden Harding, PA       Clinical Scores:                                   Differential diagnosis:  Intracranial mass, stroke, orthostatic dizziness, peripheral vertigo such as BPPV or Ménière's    Diagnosis  Final diagnoses:   Nonintractable episodic headache, unspecified headache type   Dizziness          Warren Pickett II, MD  03/27/25 1404     MD  03/27/25 1409       Warren Pickett II, MD  04/02/25 4522

## 2025-03-27 NOTE — ED PROVIDER NOTES
EMERGENCY DEPARTMENT ENCOUNTER    Room Number:  03/03  Date of encounter:  3/27/2025  PCP: Shanae Dash APRN  Historian: Patient  Chronic or social conditions impacting care (social determinants of health): None    HPI:  Chief Complaint: Headache, dizziness  A complete HPI/ROS/PMH/PSH/SH/FH are unobtainable due to: Nothing    Context: Jose Luis Davis is a 74 y.o. male with a history of vertigo, BPH, hypertension, mild carotid stenosis, who presents to the ED c/o acute headache and dizziness.  Patient reports that his symptoms have been present over the past 3 weeks.  Planes of a global headache which feels tingly and shocky at times.  He states that wearing a hat or laying on the back of his head seem to worsen the symptoms.  He denies any unilateral weakness or numbness in the extremities.  He denies any nausea or vomiting.  He reports that he will get dizzy at times with position changes or bending over and standing up quickly.  He denies any true room spinning, vertiginous dizziness.  He does follow with Dr. Alegre and vascular surgery for history of mild carotid stenosis.  At home if he takes an Excedrin his headache seems to resolve.    Review of prior external notes (non-ED):   I reviewed admission from 4/8/2023.  Patient admitted for vertigo and headache.  Of note the patient states that this dizziness today is much different than his previous vertigo.    Review of prior external test results outside of this encounter:  I reviewed a negative MRI of the brain dated 4/8/2023.    PAST MEDICAL HISTORY  Active Ambulatory Problems     Diagnosis Date Noted    Ureterolithiasis 03/10/2016    Benign prostatic hyperplasia with urinary hesitancy 04/28/2017    Essential hypertension 04/28/2017    Dyslipidemia 04/28/2017    Osteoarthritis of multiple joints 04/28/2017    Tension type headache 06/20/2018    Migraine without aura 12/06/2018    Dizziness 04/08/2023    Benign paroxysmal positional vertigo 04/13/2023     History of tobacco abuse 10/03/2023    Elevated PSA 10/03/2023    Need for immunization against influenza 10/03/2023    Dyspnea 10/03/2023    Medicare annual wellness visit, subsequent 10/03/2023    Chest pain, atypical 10/19/2023    Pulmonary nodule 04/05/2024    Seasonal allergies 04/05/2024    Lumbar back pain 04/05/2024    Right hip pain 04/05/2024     Resolved Ambulatory Problems     Diagnosis Date Noted    No Resolved Ambulatory Problems     Past Medical History:   Diagnosis Date    HLD (hyperlipidemia)     Kidney stone          PAST SURGICAL HISTORY  Past Surgical History:   Procedure Laterality Date    CYSTOSCOPY URETEROSCOPY LASER LITHOTRIPSY Left 3/12/2016    Procedure: Cyto, Trans urethral left ureterral orifice incision, left ureteroscopy, holmium laser lithotripsy, basket extraction of stone fragment, placement of left double J ureteral stent. ;  Surgeon: Nathan Freeman Jr., MD;  Location: [facility];  Service:     URETEROSCOPY LASER LITHOTRIPSY WITH STENT INSERTION Right 1/14/2025    Procedure: RIGHT URETEROSCOPY LASER LITHOTRIPSY STONE BASKET EXTRACTION STENT PLACEMENT;  Surgeon: Acosta Gongora MD;  Location: [facility] OR;  Service: Urology;  Laterality: Right;         FAMILY HISTORY  History reviewed. No pertinent family history.      SOCIAL HISTORY  Social History     Socioeconomic History    Marital status:    Tobacco Use    Smoking status: Former     Passive exposure: Never    Smokeless tobacco: Never    Tobacco comments:     quit about 30 yrs ago   Vaping Use    Vaping status: Never Used   Substance and Sexual Activity    Alcohol use: Yes     Alcohol/week: 6.0 standard drinks of alcohol     Types: 6 Cans of beer per week    Drug use: No    Sexual activity: Not Currently     Partners: Female         ALLERGIES  Patient has no known allergies.        REVIEW OF SYSTEMS  All systems reviewed and negative except for those discussed in HPI.       PHYSICAL EXAM    I have reviewed  the triage vital signs and nursing notes.    ED Triage Vitals   Temp Heart Rate Resp BP SpO2   03/27/25 0605 03/27/25 0605 03/27/25 0605 03/27/25 0606 03/27/25 0605   97.9 °F (36.6 °C) 95 18 153/96 99 %      Temp src Heart Rate Source Patient Position BP Location FiO2 (%)   -- -- -- -- --              Physical Exam  GENERAL: Alert, oriented, not distressed  HENT: head atraumatic, no nuchal rigidity  EYES: No nystagmus, extraocular movements intact  CV: regular rhythm, regular rate, no murmur  RESPIRATORY: normal effort, CTA  ABDOMEN: soft, nontender  MUSCULOSKELETAL: no deformity, FROM, no calf swelling or tenderness  NEURO: Speech, normal cerebellar function  SKIN: warm, dry        LAB RESULTS  Recent Results (from the past 24 hours)   ECG 12 Lead Other; dizziness    Collection Time: 03/27/25  6:30 AM   Result Value Ref Range    QT Interval 416 ms    QTC Interval 461 ms   Comprehensive Metabolic Panel    Collection Time: 03/27/25  6:37 AM    Specimen: Blood   Result Value Ref Range    Glucose 105 (H) 65 - 99 mg/dL    BUN 22 8 - 23 mg/dL    Creatinine 0.87 0.76 - 1.27 mg/dL    Sodium 142 136 - 145 mmol/L    Potassium 4.0 3.5 - 5.2 mmol/L    Chloride 106 98 - 107 mmol/L    CO2 24.0 22.0 - 29.0 mmol/L    Calcium 9.6 8.6 - 10.5 mg/dL    Total Protein 7.8 6.0 - 8.5 g/dL    Albumin 4.6 3.5 - 5.2 g/dL    ALT (SGPT) 35 1 - 41 U/L    AST (SGOT) 29 1 - 40 U/L    Alkaline Phosphatase 93 39 - 117 U/L    Total Bilirubin 0.7 0.0 - 1.2 mg/dL    Globulin 3.2 gm/dL    A/G Ratio 1.4 g/dL    BUN/Creatinine Ratio 25.3 (H) 7.0 - 25.0    Anion Gap 12.0 5.0 - 15.0 mmol/L    eGFR 91.1 >60.0 mL/min/1.73   High Sensitivity Troponin T    Collection Time: 03/27/25  6:37 AM    Specimen: Blood   Result Value Ref Range    HS Troponin T 8 <22 ng/L   CBC Auto Differential    Collection Time: 03/27/25  6:37 AM    Specimen: Blood   Result Value Ref Range    WBC 5.63 3.40 - 10.80 10*3/mm3    RBC 4.27 4.14 - 5.80 10*6/mm3    Hemoglobin 13.9 13.0 -  17.7 g/dL    Hematocrit 40.1 37.5 - 51.0 %    MCV 93.9 79.0 - 97.0 fL    MCH 32.6 26.6 - 33.0 pg    MCHC 34.7 31.5 - 35.7 g/dL    RDW 12.1 (L) 12.3 - 15.4 %    RDW-SD 42.0 37.0 - 54.0 fl    MPV 10.1 6.0 - 12.0 fL    Platelets 248 140 - 450 10*3/mm3    Neutrophil % 53.1 42.7 - 76.0 %    Lymphocyte % 35.2 19.6 - 45.3 %    Monocyte % 9.2 5.0 - 12.0 %    Eosinophil % 1.8 0.3 - 6.2 %    Basophil % 0.5 0.0 - 1.5 %    Immature Grans % 0.2 0.0 - 0.5 %    Neutrophils, Absolute 2.99 1.70 - 7.00 10*3/mm3    Lymphocytes, Absolute 1.98 0.70 - 3.10 10*3/mm3    Monocytes, Absolute 0.52 0.10 - 0.90 10*3/mm3    Eosinophils, Absolute 0.10 0.00 - 0.40 10*3/mm3    Basophils, Absolute 0.03 0.00 - 0.20 10*3/mm3    Immature Grans, Absolute 0.01 0.00 - 0.05 10*3/mm3    nRBC 0.0 0.0 - 0.2 /100 WBC   High Sensitivity Troponin T 1Hr    Collection Time: 03/27/25  7:41 AM    Specimen: Blood   Result Value Ref Range    HS Troponin T 8 <22 ng/L    Troponin T Numeric Delta 0 Abnormal if >/=3 ng/L       Ordered the above labs and independently reviewed the results.        RADIOLOGY  CT Angiogram Head  CT Angiogram Head, CT Angiogram Neck  Result Date: 3/27/2025  CT ANGIOGRAM NECK AND HEAD WITH CONTRAST  HISTORY: Headache, dizziness.  COMPARISON: MRI brain 04/08/2023.  FINDINGS: Initially, a noncontrasted CT examination of the brain was performed. The brain ventricles are symmetrical. There is no evidence of acute infarction or intracranial hemorrhage. A CT angiogram of the neck and head was then performed. Multiplanar as well as three-dimensional reconstructions were generated.  Mild vascular calcifications involving the internal carotid arteries are appreciated bilaterally with 0% stenosis using NASCET criteria. The left A1 segment is mildly hypoplastic. Otherwise, the proximal aspects of the anterior and middle cerebral arteries appear unremarkable. Both vertebral arteries were opacified. The vertebral arteries are codominant. The basilar artery  and the proximal aspects of the posterior cerebral arteries appear unremarkable.  Sagittal reconstructions demonstrate moderate to severe loss of disc height from C4 to C7.      There is no evidence of acute infarction or of proximal intracranial arterial high-grade stenosis or occlusion. There is 0% stenosis of the internal carotid arteries using NASCET criteria. Further evaluation could be performed with a MRI examination of the brain.  Note: This is a preliminary report. The 3-dimensional reconstructions are not yet available for review.  Radiation dose reduction techniques were utilized, including automated exposure control and exposure modulation based on body size.           I ordered the above noted radiological studies. Reviewed by me and discussed with radiologist.  See dictation for official radiology interpretation.      MEDICATIONS GIVEN IN ER    Medications   iopamidol (ISOVUE-370) 76 % injection 100 mL (90 mL Intravenous Given by Other 3/27/25 5675)         ADDITIONAL ORDERS CONSIDERED BUT NOT ORDERED:  Admission was considered but after careful review of the patient's presentation, physical examination, diagnostic results, and response to treatment the patient may be safely discharged with outpatient follow-up.       PROGRESS, DATA ANALYSIS, CONSULTS, AND MEDICAL DECISION MAKING    All labs have been independently interpreted by myself.  All radiology studies have been independently interpreted by myself and discussed with radiologist dictating the report.   EKGs independently interpreted by myself.  Discussion below represents my analysis of pertinent findings related to patient's condition, differential diagnosis, treatment plan and final disposition.    I have discussed case with Dr. Pickett, emergency room physician.  He has performed his own bedside examination and agrees with treatment plan.    ED Course as of 03/27/25 1510   Thu Mar 27, 2025   0615 Patient presents with a several week history of  intermittent global headache as well as positional dizziness.  No true vertiginous dizziness.  Nonfocal neuroexam.    Patient does have a history of some mild carotid vascular disease which she is followed by Dr. Alegre.  Will obtain CTA imaging of the head and neck. [EE]   0731 WBC: 5.63 [EE]   0731 Hemoglobin: 13.9 [EE]   0845 CT imaging of the brain independently interpreted myself shows no evidence of acute hemorrhage. [EE]   0909 Troponin T Numeric Delta: 0 [EE]   0932 Updated patient on workup.  He is neurologically intact.  No clear etiology to his symptoms.  Certainly no evidence of CVA.  He does have severe cervical degenerative changes which may be partly inflamed.  We will refer to neurology.  Excedrin has been helping the patient's symptoms. [EE]      ED Course User Index  [EE] Brenden Harding PA       AS OF 15:10 EDT VITALS:    BP - 142/83  HR - 69  TEMP - 97.9 °F (36.6 °C)  O2 SATS - 97%        DIAGNOSIS  Final diagnoses:   Nonintractable episodic headache, unspecified headache type   Dizziness         DISPOSITION  Discharged    Admission was considered but after careful review of the patient's presentation, physical examination, diagnostic results, and response to treatment the patient may be safely discharged with outpatient follow-up.         Dictated utilizing Dragon dictation     Brenden Harding PA  03/27/25 5206

## 2025-04-02 ENCOUNTER — PATIENT OUTREACH (OUTPATIENT)
Dept: CASE MANAGEMENT | Facility: OTHER | Age: 75
End: 2025-04-02
Payer: MEDICARE

## 2025-04-02 NOTE — OUTREACH NOTE
AMBULATORY CASE MANAGEMENT NOTE    Names and Relationships of Patient/Support Persons: Contact: Jose Luis Davis W; Relationship: Self -     Adult Patient Profile  Questions/Answers      Flowsheet Row Most Recent Value   Symptoms/Conditions Managed at Home neurological   Neurological Symptoms/Conditions headache, vertigo  [reports waxing and waning of symptoms since ED visit 3/27]   Neurological Management Strategies medication therapy, other (see comments)  [new consult for neurology waiting for office to schedule visit]   Neurological Self-Management Outcome unsure   Neurological Comment verbalized ED instructions to return with symptoms, discussed ED/ [facility]gent [facility], provided with Urgent Care hours for locations near his zip code, provided with the 24 hr nurse call center, advised seeking medical evaluation for symptoms and scheduled next available PCP appt for April 9 at 11 am   Taking Medications Not Prescribed yes, over-the-counter medicines  [excedrine and acetaminophen]   OTC Medications pain or fever relief, like Aleve, Motrin, Advil, Tylenol          Patient Outreach    Outgoing call to the patient for ED follow up.  Introduced self and explained role and purpose of outreach.  He prefers enrollment in [facility] program and verbalized understanding that there is no charges for services and that he may opt out at any time.    Care Coordination    Follow up appointment with PCP office made for the next available on April 9 at 11 am with patient permission.  Notified patient of appointment.      Outreach is scheduled for about 7 days for follow up.  Encouraged him to outreach with any questions or needs.  Plan to complete health assessments and work on goals of care at this outreach.  Need to complete SDOH assessment.           Jacklyn VALDES  Ambulatory Case Management    4/2/2025, 11:01 EDT

## 2025-04-09 ENCOUNTER — OFFICE VISIT (OUTPATIENT)
Dept: FAMILY MEDICINE CLINIC | Facility: CLINIC | Age: 75
End: 2025-04-09
Payer: MEDICARE

## 2025-04-09 VITALS
BODY MASS INDEX: 27.85 KG/M2 | HEART RATE: 74 BPM | SYSTOLIC BLOOD PRESSURE: 150 MMHG | HEIGHT: 72 IN | TEMPERATURE: 99.1 F | WEIGHT: 205.6 LBS | DIASTOLIC BLOOD PRESSURE: 92 MMHG | OXYGEN SATURATION: 98 %

## 2025-04-09 DIAGNOSIS — I10 ESSENTIAL HYPERTENSION: ICD-10-CM

## 2025-04-09 DIAGNOSIS — R42 VERTIGO: Primary | ICD-10-CM

## 2025-04-09 DIAGNOSIS — G44.209 ACUTE NON INTRACTABLE TENSION-TYPE HEADACHE: ICD-10-CM

## 2025-04-09 PROCEDURE — 3080F DIAST BP >= 90 MM HG: CPT | Performed by: NURSE PRACTITIONER

## 2025-04-09 PROCEDURE — 1159F MED LIST DOCD IN RCRD: CPT | Performed by: NURSE PRACTITIONER

## 2025-04-09 PROCEDURE — G2211 COMPLEX E/M VISIT ADD ON: HCPCS | Performed by: NURSE PRACTITIONER

## 2025-04-09 PROCEDURE — 1125F AMNT PAIN NOTED PAIN PRSNT: CPT | Performed by: NURSE PRACTITIONER

## 2025-04-09 PROCEDURE — 3077F SYST BP >= 140 MM HG: CPT | Performed by: NURSE PRACTITIONER

## 2025-04-09 PROCEDURE — 99214 OFFICE O/P EST MOD 30 MIN: CPT | Performed by: NURSE PRACTITIONER

## 2025-04-09 PROCEDURE — 1160F RVW MEDS BY RX/DR IN RCRD: CPT | Performed by: NURSE PRACTITIONER

## 2025-04-09 RX ORDER — MECLIZINE HCL 12.5 MG 12.5 MG/1
12.5 TABLET ORAL 2 TIMES DAILY PRN
Qty: 30 TABLET | Refills: 0 | Status: SHIPPED | OUTPATIENT
Start: 2025-04-09

## 2025-04-09 NOTE — PROGRESS NOTES
"Chief Complaint  Hospital Follow Up Visit (ED 3/27-headache/Still experiencing dizziness, \"heavy\" feeling on head, pain with pressure/Unable to function when symptoms occur/Radiologist recommended MRI on recent CT/Seen [id number] Dr. Renetta Chavez with same symptoms with Luis A)    Lj Davis presents to [facility]  History of Present Illness  Patient presents to the office today for follow-up from emergency room on 3/27/2025.  Patient presented with ongoing headaches for several weeks. The dizziness changes over time some days it will last 5 seconds other times greater than that.  Patient had a full work up in the ER with normal readings.  CT exam was normal, indicating if he is still having symptoms further evaluation with an MRI.  Patient was worked up in the emergency room negative for vertigo dizziness.  Patient has been referred to neurology for further eval.  Blood pressure elevated 150/92.  Patient to continue monitoring blood pressure.        Objective   Vital Signs:  /92 (BP Location: Left arm, Patient Position: Sitting, Cuff Size: Adult)   Pulse 74   Temp 99.1 °F (37.3 °C)   Ht 182.9 cm (72.01\")   Wt 93.3 kg (205 lb 9.6 oz)   SpO2 98%   BMI 27.88 kg/m²   Estimated body mass index is 27.88 kg/m² as calculated from the following:    Height as of this encounter: 182.9 cm (72.01\").    Weight as of this encounter: 93.3 kg (205 lb 9.6 oz).            Physical Exam  Constitutional:       General: He is not in acute distress.     Appearance: Normal appearance.   HENT:      Head: Normocephalic.   Eyes:      Pupils: Pupils are equal, round, and reactive to light.   Cardiovascular:      Rate and Rhythm: Normal rate.      Pulses: Normal pulses.      Heart sounds: Normal heart sounds.   Pulmonary:      Effort: Pulmonary effort is normal.      Breath sounds: Normal breath sounds.   Musculoskeletal:         General: Normal range of motion.      Cervical " back: Normal range of motion and neck supple.   Skin:     General: Skin is warm.   Neurological:      General: No focal deficit present.      Mental Status: He is alert and oriented to person, place, and time.   Psychiatric:         Mood and Affect: Mood normal.         Behavior: Behavior normal.         Thought Content: Thought content normal.         Judgment: Judgment normal.        Result Review :  The following data was reviewed by: NICHOLAS Palafox on 04/09/2025:  Common labs          10/7/2024    10:55 12/14/2024    14:16 12/14/2024    15:13 3/27/2025    06:37   Common Labs   Glucose 111   88  105    BUN 15   18  22    Creatinine 0.92   0.94  0.87    Sodium 142   142  142    Potassium 5.1   3.9  4.0    Chloride 105   108  106    Calcium 9.9   9.6  9.6    Albumin 4.8   4.1  4.6    Total Bilirubin 0.8   0.7  0.7    Alkaline Phosphatase 99   83  93    AST (SGOT) 27   26  29    ALT (SGPT) 33   33  35    WBC  6.89   5.63    Hemoglobin  14.7   13.9    Hematocrit  42.2   40.1    Platelets  266   248    Total Cholesterol 133       Triglycerides 67       HDL Cholesterol 49       LDL Cholesterol  70         Data reviewed : Radiologic studies CT head            Assessment and Plan   Diagnoses and all orders for this visit:    1. Vertigo (Primary)  -     meclizine (ANTIVERT) 12.5 MG tablet; Take 1 tablet by mouth 2 (Two) Times a Day As Needed for Dizziness.  Dispense: 30 tablet; Refill: 0    2. Essential hypertension  Assessment & Plan:  Hypertension is stable and controlled  Continue current treatment regimen.  Monitor blood pressure and return to office with readings in 1 month.  Blood pressure will be reassessed in 1 month.      3. Acute non intractable tension-type headache  Assessment & Plan:  Headaches are stable.    Plan:  Continue same medication/s without change.     Discussed medication dosage, use, side effects, and goals of treatment in detail.    Discussed monitoring symptoms and use of quick-relief  medications and maintenance medication.  Further evaluation with neurology.    General Treatment Goals:   symptom prevention  minimize work absence  minimizing limitation in activity  prevention of exacerbations  decrease use of ER/inpatient care  minimization of adverse effects of treatment    Followup at the next regular appointment                  Side effects of all new and old medications reviewed with the patient -willing to accept all risks involved.  Advised to rto if no improvement or worsening of symptoms.  Patient instructed to  clinical summary at .          I spent 20 minutes caring for Jose Luis on this date of service. This time includes time spent by me in the following activities:preparing for the visit, reviewing tests, obtaining and/or reviewing a separately obtained history, performing a medically appropriate examination and/or evaluation , counseling and educating the patient/family/caregiver, ordering medications, tests, or procedures, documenting information in the medical record, independently interpreting results and communicating that information with the patient/family/caregiver, and care coordination  Follow Up   Return in about 6 weeks (around 5/21/2025) for Medicare Wellness.  Patient was given instructions and counseling regarding his condition or for health maintenance advice. Please see specific information pulled into the AVS if appropriate.

## 2025-04-14 DIAGNOSIS — I65.23 BILATERAL CAROTID ARTERY STENOSIS: Primary | ICD-10-CM

## 2025-04-21 NOTE — ASSESSMENT & PLAN NOTE
Hypertension is stable and controlled  Continue current treatment regimen.  Monitor blood pressure and return to office with readings in 1 month.  Blood pressure will be reassessed in 1 month.

## 2025-04-21 NOTE — ASSESSMENT & PLAN NOTE
Headaches are stable.    Plan:  Continue same medication/s without change.     Discussed medication dosage, use, side effects, and goals of treatment in detail.    Discussed monitoring symptoms and use of quick-relief medications and maintenance medication.  Further evaluation with neurology.    General Treatment Goals:   symptom prevention  minimize work absence  minimizing limitation in activity  prevention of exacerbations  decrease use of ER/inpatient care  minimization of adverse effects of treatment    Followup at the next regular appointment

## 2025-04-23 ENCOUNTER — TELEPHONE (OUTPATIENT)
Dept: FAMILY MEDICINE CLINIC | Facility: CLINIC | Age: 75
End: 2025-04-23

## 2025-04-23 NOTE — TELEPHONE ENCOUNTER
PT CALLED CHECKING ON THE STATUS OF A ORDER FOR MRI OF THE BRAIN, STATED J=DURING LAST VISIT IT WAS HIS UNDERSTANDING THAT [facility] WAS GOING TO ORDER HIM A MRI.

## 2025-04-24 ENCOUNTER — PATIENT OUTREACH (OUTPATIENT)
Dept: CASE MANAGEMENT | Facility: OTHER | Age: 75
End: 2025-04-24
Payer: MEDICARE

## 2025-04-24 NOTE — PLAN OF CARE
Problem: General Plan of Care  Goal: Make and Keep All Appointments  Outcome: Progressing  Intervention: My Appointments To Do List  Description: Why is this important?Part of staying healthy is seeing the doctor for follow-up care.If you forget your appointments, there are some things you can do to stay on track.  Flowsheets (Taken 4/24/2025 1102)  My Appointments To Do List:   ask family or friend for a ride   call to cancel if needed   keep a calendar with appointment dates  Goal: Protect My Health  Outcome: Progressing  Intervention: My Health Protection To Do List  Description: Why is this important?Screening tests can find diseases early when they are easier to treat.Your doctor or nurse will talk with you about which tests are important for you.Getting shots for common diseases like the flu and shingles will help prevent them.  Flowsheets (Taken 4/24/2025 1102)  My Health Protection To Do List:   schedule appointment for vaccines needed due to my age or health   schedule recommended health tests (blood work, mammogram, colonoscopy, pap test)   schedule and keep appointment for annual check-up  Goal: Manage My Emotions  Outcome: Progressing  Intervention: My Emotion Management To Do List  Description: Why is this important?When you are stressed, down or upset, your body reacts too.For example, your blood pressure may get higher; you may have a headache or stomachache.When your emotions get the best of you, your body's ability to fight off cold and flu gets weak.These steps will help you manage your emotions.  Flowsheets (Taken 4/24/2025 1102)  My Emotion Management To Do List: practice relaxation or meditation daily  Goal: Maintain My Quality of Life  Outcome: Progressing  Intervention: My Quality of Life To Do List  Description: Why is this important?Having a long-term illness can be scary.It can also be stressful for you and your caregiver.These steps may help.  Flowsheets (Taken 4/24/2025 1102)  My  Quality of Life To Do List:   complete a living will   do one enjoyable thing every day   name a health care proxy (decision maker)   spend time outdoors at least 3 times a week  Goal: Optimal Care Coordination  Outcome: Progressing  Intervention: Develop Relationship to Effect Behavior Change  Flowsheets (Taken 4/24/2025 1102)  Develop Relationship to Effect Behavior Change:   care explained   self-reliance encouraged   reassurance provided   collaboration with team encouraged  Intervention: Mutually Develop and Foster Achievement of Patient Goals  Flowsheets (Taken 4/24/2025 1102)  Mutually Develop and Foster Achievement of Patient Goals:   advance care planning facilitated   questions answered   self-reliance encouraged   verbalization of feelings encouraged  Intervention: Support Psychosocial Response to Risk or Actual Health Condition  Flowsheets (Taken 4/24/2025 1102)  Support Psychosocial Response to Risk or Actual Health Condition:   active listening utilized   spiritual activities promoted  Intervention: Support and Maintain Acceptable Degree of Health, Comfort and Happiness  Flowsheets (Taken 4/24/2025 1102)  Support and Maintain Acceptable Degree of Health, Comfort and Happiness:   self-expression encouraged   patient strengths promoted   sleep hygiene techniques encouraged   strategies to maintain hearing and/or vision promoted   wellness behaviors promoted

## 2025-04-24 NOTE — OUTREACH NOTE
AMBULATORY CASE MANAGEMENT NOTE    Names and Relationships of Patient/Support Persons: Contact: Jose Luis Davis; Relationship: Self -     Adult Patient Profile  Questions/Answers      Flowsheet Row Most Recent Value   Symptoms/Conditions Managed at Home neurological, genitourinary   Barriers to Managing Health none   Genitourinary Symptoms/Conditions other (see comments)  [BPH]   Genitourinary Self-Management Outcome well   Genitourinary Comment MD Sven   Neurological Symptoms/Conditions --  [waxing and waning vertigo]   Neurological Management Strategies medication therapy, routine screening   Neurological Self-Management Outcome unsure   Neurological Comment has PCP follow up   Hearing Difficulty or Deaf no   Visual Difficulty or Blind yes   Vision Management glasses   Difficulty Concentrating, Remembering or Making Decisions no   Communication Difficulty no   Eating/Swallowing Difficulty no   Walking or Climbing Stairs Difficulty no   Dressing/Bathing Difficulty no   Difficulty Managing Errands Independently no   Equipment Currently Used at Home cane, straight   Change in Functional Status Since Onset of Current Illness/Injury no   Pre-existing AND/MOST/POLST Order No   Advance Directive Status Patient does not have advance directive   Health Maintenance Behaviors none   Healing Pattern average   Health Facilitated by healthy diet, prayer/meditation   Usual Activity Tolerance good   Current Activity Tolerance good   Have you fallen in the past year? No   Do you feel unsteady with walking? Yes  [uses cane due to hip pain and vertigo]   Patient Fall Risk Assessment Score  0   Fall Risk Category Low   Q1: How often do you have a drink containing alcohol? Monthly or l   Q2: How many drinks containing alcohol do you have on a typical day when you are drinking? 1 or 2   Q3: How often do you have six or more drinks on one occasion? Never   Audit-C Score 1   Current Living Arrangements home          Adult Patient  Profile  Questions/Answers      Flowsheet Row Most Recent Value   Symptoms/Conditions Managed at Home neurological, genitourinary   Barriers to Managing Health none   Genitourinary Symptoms/Conditions other (see comments)  [BPH]   Genitourinary Self-Management Outcome well   Genitourinary Comment MD Sven   Neurological Symptoms/Conditions --  [waxing and waning vertigo]   Neurological Management Strategies medication therapy, routine screening   Neurological Self-Management Outcome unsure   Neurological Comment has PCP follow up   Hearing Difficulty or Deaf no   Visual Difficulty or Blind yes   Vision Management glasses   Difficulty Concentrating, Remembering or Making Decisions no   Communication Difficulty no   Eating/Swallowing Difficulty no   Walking or Climbing Stairs Difficulty no   Dressing/Bathing Difficulty no   Difficulty Managing Errands Independently no   Equipment Currently Used at Home cane, straight   Change in Functional Status Since Onset of Current Illness/Injury no   Pre-existing AND/MOST/POLST Order No   Advance Directive Status Patient does not have advance directive   Health Maintenance Behaviors none   Healing Pattern average   Health Facilitated by healthy diet, prayer/meditation   Usual Activity Tolerance good   Current Activity Tolerance good   Have you fallen in the past year? No   Do you feel unsteady with walking? Yes  [uses cane due to hip pain and vertigo]   Patient Fall Risk Assessment Score  0   Fall Risk Category Low   Q1: How often do you have a drink containing alcohol? Monthly or l   Q2: How many drinks containing alcohol do you have on a typical day when you are drinking? 1 or 2   Q3: How often do you have six or more drinks on one occasion? Never   Audit-C Score 1   Current Living Arrangements home          SDOH updated and reviewed with the patient during this program:  --     Alcohol Use: Not At Risk (4/24/2025)    AUDIT-C     Frequency of Alcohol Consumption: Monthly or  less     Average Number of Drinks: 1 or 2     Frequency of Binge Drinking: Never      --     Depression: Not at risk (3/5/2025)    PHQ-2     PHQ-2 Score: 0      --     Employment: Not At Risk (4/24/2025)    Employment     Do you want help finding or keeping work or a job?: I do not need or want help      Financial Resource Strain: Low Risk  (4/24/2025)    Overall Financial Resource Strain (CARDIA)     Difficulty of Paying Living Expenses: Not hard at all      --     Food Insecurity: No Food Insecurity (4/24/2025)    Hunger Vital Sign     Worried About Running Out of Food in the Last Year: Never true     Ran Out of Food in the Last Year: Never true      --     Housing Stability: Low Risk  (4/24/2025)    Housing Stability Vital Sign     Unable to Pay for Housing in the Last Year: No     Number of Times Moved in the Last Year: 0     Homeless in the Last Year: No      --     Physical Activity: Inactive (4/24/2025)    Exercise Vital Sign     Days of Exercise per Week: 0 days     Minutes of Exercise per Session: 0 min      --     Social Connections: Socially Isolated (4/24/2025)    Social Connection and Isolation Panel [NHANES]     Frequency of Communication with Friends and Family: Never     Frequency of Social Gatherings with Friends and Family: Once a week     Attends Anabaptist Services: Never     Active Member of Clubs or Organizations: Yes     Attends Club or Organization Meetings: 1 to 4 times per year     Marital Status:       --     Stress: No Stress Concern Present (4/24/2025)    [employer] - Occupational Stress Questionnaire     Feeling of Stress : Not at all      --     Tobacco Use: Medium Risk (4/9/2025)    Patient History     Smoking Tobacco Use: Former     Smokeless Tobacco Use: Never     Passive Exposure: Never      --     Transportation Needs: No Transportation Needs (4/24/2025)    PRAPARE - Transportation     Lack of Transportation (Medical): No     Lack of  Transportation (Non-Medical): No      --     Utilities: Not At Risk (4/24/2025)    [facility] Utilities     Threatened with loss of utilities: No     Patient Outreach    Outreach for follow up with patient.  Health assessments completed and general care plan was initiated and reviewed with patient.    He prefers a follow up in one month and this outreach is scheduled.  He was encouraged to outreach to [facility] for any needs or questions.        Jacklyn VALDES  Ambulatory Case Management    4/24/2025, 11:05 EDT

## 2025-04-24 NOTE — TELEPHONE ENCOUNTER
He was referred to neurology in March, can he call to schedule appt?   I would prefer neurology to order MRI if he will be seeing their office soon, thanks

## 2025-04-25 NOTE — TELEPHONE ENCOUNTER
Patient informed MRI order placed. Would like referral to [facility] for benign vertigo. Meclizine seems to be helping.

## 2025-04-28 DIAGNOSIS — H81.10 BENIGN PAROXYSMAL POSITIONAL VERTIGO, UNSPECIFIED LATERALITY: Primary | ICD-10-CM

## 2025-05-14 ENCOUNTER — HOSPITAL ENCOUNTER (OUTPATIENT)
Dept: PHYSICAL THERAPY | Facility: HOSPITAL | Age: 75
Setting detail: THERAPIES SERIES
Discharge: HOME | End: 2025-05-14
Payer: MEDICARE

## 2025-05-14 DIAGNOSIS — H81.10 BPPV (BENIGN PAROXYSMAL POSITIONAL VERTIGO), UNSPECIFIED LATERALITY: ICD-10-CM

## 2025-05-14 DIAGNOSIS — R42 VERTIGO: Primary | ICD-10-CM

## 2025-05-14 PROCEDURE — 95992 CANALITH REPOSITIONING PROC: CPT

## 2025-05-14 PROCEDURE — 97161 PT EVAL LOW COMPLEX 20 MIN: CPT

## 2025-05-14 NOTE — THERAPY EVALUATION
Outpatient Physical Therapy Vestibular Initial Evaluation  [facility]     Patient Name: Jose Luis Davis  : 1950  MRN: [id number]  Today's Date: 2025      Visit Date: 2025    Patient Active Problem List   Diagnosis    Ureterolithiasis    Benign prostatic hyperplasia with urinary hesitancy    Essential hypertension    Dyslipidemia    Osteoarthritis of multiple joints    Tension type headache    Migraine without aura    Vertigo    Benign paroxysmal positional vertigo    History of tobacco abuse    Elevated PSA    Need for immunization against influenza    Dyspnea    Medicare annual wellness visit, subsequent    Chest pain, atypical    Pulmonary nodule    Seasonal allergies    Lumbar back pain    Right hip pain        Past Medical History:   Diagnosis Date    Essential hypertension 2017    white coat syndrome    HLD (hyperlipidemia)     Kidney stone     Osteoarthritis of multiple joints 2017        Past Surgical History:   Procedure Laterality Date    CYSTOSCOPY URETEROSCOPY LASER LITHOTRIPSY Left 3/12/2016    Procedure: Cyto, Trans urethral left ureterral orifice incision, left ureteroscopy, holmium laser lithotripsy, basket extraction of stone fragment, placement of left double J ureteral stent. ;  Surgeon: Nathan Freeman Jr., MD;  Location: [facility] MAIN OR;  Service:     URETEROSCOPY LASER LITHOTRIPSY WITH STENT INSERTION Right 2025    Procedure: RIGHT URETEROSCOPY LASER LITHOTRIPSY STONE BASKET EXTRACTION STENT PLACEMENT;  Surgeon: Acosta Gongora MD;  Location: [facility] MAIN OR;  Service: Urology;  Laterality: Right;         Visit Dx:     ICD-10-CM ICD-9-CM   1. Vertigo  R42 780.4   2. BPPV (benign paroxysmal positional vertigo), unspecified laterality  H81.10 386.11        Patient History       Row Name 25 1000             History    Chief Complaint Dizziness  -MP      Date Current Problem(s) Began 25  dizziness intermittent end of last year 2024  -       Brief Description of Current Complaint Pt. Presents to clinic for vestibular evaluation. He was seen in ED 3/27 due to ongoing headaches for several weeks as well as dizziness. He reports the dizziness will last 5+ seconds, describes dizziness as room spinning. Aggravating factors can include standing up, reaching up overhead into counter, turning too quickly, or bending forward in the shower. He is scheduled to see neurology in June. He has a history of fairly severe vertigo 2 years prior and saw [facility] and was treated and given adaptation exercises at the time. He has been given meclizine which is helping some but has not taken any today. In regards to headaches his symptoms will be exacerbated by wearing hat, laying flat in supine but feels the headaches are unrelated to the episodes of dizziness.  -MP      Previous treatment for THIS PROBLEM Medication  -MP      Patient/Caregiver Goals Relief from dizziness;Know what to do to help the symptoms  -MP      Occupation/sports/leisure activities retired  -MP      What clinical tests have you had for this problem? CT scan  -MP      Results of Clinical Tests see epic  -MP         Pain     Pain Location Head  -MP      Pain at Present 0  -MP      Pain at Best 0  -MP      Pain at Worst 0  -MP      What Performance Factors Make the Current Problem(s) WORSE? standing up, reaching up overhead into counter, turning too quickly, or bending forward in the shower for dizziness; headaches - wearing hat, laying in supine will have pain in occiput region  -MP      What Performance Factors Make the Current Problem(s) BETTER? meclizine  -MP      Difficulties with ADL's? yes  -MP         Fall Risk Assessment    Any falls in the past year: No  -MP         Services    Prior Rehab/Home Health Experiences Yes  -MP      When was the prior experience with Rehab/Home Health 2023  -MP         Daily Activities    Primary Language English  -MP      Are you able to read Yes  -MP      Are you  able to write Yes  -MP      How does patient learn best? Listening;Reading;Demonstration  -MP      Does patient have problems with the following? None  -MP      Barriers to learning None  -MP      Pt Participated in POC and Goals Yes  -MP                User Key  (r) = Recorded By, (t) = Taken By, (c) = Cosigned By      Initials Name Provider Type    Heydi Christopher PT Physical Therapist                     Vestibular Eval       Row Name 05/14/25 1100             Positional Testing    Positional Testing Without infrared goggles  -MP      Vertebrobasilar Artery Screen - Right Negative  -MP      Vertebrobasilar Artery Screen - Left Negative  -MP      Sallie-Hallpike Right Upbeat, right rotatory nystagmus  -MP      Sallie-Hallpike Right Onset Time  3sec  -MP      Marshall-Hallpike Right Duration Time  6sec  -MP      Sallie-Hallpike Left No nystagmus  -MP      Horizontal Roll Test Right No nystagmus  -MP      Horizontal Roll Test Left No nystagmus  -MP                User Key  (r) = Recorded By, (t) = Taken By, (c) = Cosigned By      Initials Name Provider Type    Heydi Christopher, PT Physical Therapist                                Therapy Education  Education Details: Educated on PT role and POC; purpose of vestibular therapy including anatomy of vestibular system and 3 semi-circular canals; utilized model to enhance understanding. Discussed BPPV vs. Differential diagnoses.  Given: Symptoms/condition management  Program: New  How Provided: Verbal  Provided to: Patient  Level of Understanding: Verbalized       OP Exercises       Row Name 05/14/25 1100             Exercise 2    Exercise Name 2 Epley/CRM  -MP      Cueing 2 Verbal;Demo  -MP      Sets 2 1  -MP      Reps 2 1  -MP      Time 2 R PC  -MP                User Key  (r) = Recorded By, (t) = Taken By, (c) = Cosigned By      Initials Name Provider Type    Heydi Christopher, PT Physical Therapist                                 PT OP Goals       Row Name 05/14/25 1100           PT Short Term Goals    STG Date to Achieve 06/13/25  -MP     STG 1 Pt. will be (-) for BPPV in R posterior canal to reduce symptoms of dizziness.  -MP     STG 1 Progress New  -MP        Long Term Goals    LTG Date to Achieve 07/13/25  -MP     LTG 1 Pt. will remain (-) for BPPV in all canals to maintain improved QOL.  -MP     LTG 1 Progress New  -MP        Time Calculation    PT Goal Re-Cert Due Date 08/12/25  -MP               User Key  (r) = Recorded By, (t) = Taken By, (c) = Cosigned By      Initials Name Provider Type    MP Heydi Patel, PT Physical Therapist                     PT Assessment/Plan       Row Name 05/14/25 1100          PT Assessment    Functional Limitations Impaired locomotion;Decreased safety during functional activities;Limitation in home management;Limitations in functional capacity and performance;Performance in leisure activities;Performance in self-care ADL  -MP     Impairments Balance;Pain;Poor body mechanics;Posture;Range of motion  -MP     Assessment Comments Jose Luis Davis is a 74 y.o. year-old male referred to physical therapy for vertigo. He presents with a stable clinical presentation. He has comorbidities of vertigo, BPH, hypertension, mild carotid stenosis, R hip OA with possible GLORIA, headaches and personal factors of history of BPPV that may affect his progress in the plan of care. Self scored disability measure of DHI was a 28/100 indicating mild handicap. He demonstrated (+) R PC BPPV, treated with Epley/CRM. Discussed plan to transition to cervicogenic component as indicated following resolution of BPPV. Signs and symptoms are consistent with referring diagnosis. He is appropriate for skilled therapy services at this time to address deficits and improve ease with ADLs.  -MP     Please refer to paper survey for additional self-reported information No  -MP     Rehab Potential Good  -MP     Patient/caregiver participated in establishment of treatment plan and goals  Yes  -MP     Patient would benefit from skilled therapy intervention Yes  -MP        PT Plan    PT Frequency 1x/week  -MP     Predicted Duration of Therapy Intervention (PT) 6 visits  -MP     Planned CPT's? PT RE-EVAL: 45205;PT THER PROC EA 15 MIN: 51923;PT THER ACT EA 15 MIN: 22897;PT MANUAL THERAPY EA 15 MIN: 73145;PT NEUROMUSC RE-EDUCATION EA 15 MIN: 24965;PT GAIT TRAINING EA 15 MIN: 29387;PT SELF CARE/HOME MGMT/TRAIN EA 15: 54040;PT CANALITH REPOSITIONIN;PT EVAL LOW COMPLEXITY: 95815  -MP     PT Plan Comments vestibular - reassess BPPV  -MP               User Key  (r) = Recorded By, (t) = Taken By, (c) = Cosigned By      Initials Name Provider Type    Heydi Christopher, PT Physical Therapist                               Time Calculation:   Start Time: 1030  Stop Time: 1108  Time Calculation (min): 38 min  Untimed Charges  PT Eval/Re-eval Minutes: 30  PT Canalith Repositionin  Total Minutes  Untimed Charges Total Minutes: 38   Total Minutes: 38   Therapy Charges for Today       Code Description Service Date Service Provider Modifiers Qty    14910624874 HC PT CANALITH REPOSITIONING PER DAY 2025 Heydi Patel, PT GP 1    93750718727 HC PT EVAL LOW COMPLEXITY 3 2025 Heydi Patel, PT GP 1                      Heydi Patel, PT  2025

## 2025-05-15 ENCOUNTER — OFFICE VISIT (OUTPATIENT)
Dept: ORTHOPEDIC SURGERY | Facility: CLINIC | Age: 75
End: 2025-05-15
Payer: MEDICARE

## 2025-05-15 VITALS — TEMPERATURE: 97.5 F | HEIGHT: 72 IN | BODY MASS INDEX: 27.43 KG/M2 | WEIGHT: 202.5 LBS

## 2025-05-15 DIAGNOSIS — M16.11 PRIMARY OSTEOARTHRITIS OF RIGHT HIP: Primary | ICD-10-CM

## 2025-05-16 NOTE — PROGRESS NOTES
Patient: Jose Luis Davis  YOB: 1950 74 y.o. male  Medical Record Number: [id number]    Chief Complaint:   Chief Complaint   Patient presents with    Right Hip - Follow-up, Pain       History of Present Illness:Jose Luis Davis is a 74 y.o. male who presents for follow-up of right hip pain.  Patient seen previously diagnosed with right hip osteoarthritis.  Patient states he has been getting hip injections the first 1 helped quite a bit the second did not last as long.  He would like discuss other treatment options however he did have some recent head issues as he reports he is seeing neurology has upcoming MRI of his brain and evaluation to follow with them.  Also reports prostate surgery scheduled for August.    Allergies: No Known Allergies    Medications:   Current Outpatient Medications   Medication Sig Dispense Refill    APPLE CIDER VINEGAR PO Take  by mouth Daily. Liquid blend      Coenzyme Q10 (CO Q 10 PO) Take 200 mg by mouth Daily.      glucosamine sulfate 500 MG capsule capsule Take 1 capsule by mouth 3 (Three) Times a Day With Meals. EZ move      Magnesium 400 MG tablet Take 400 mg by mouth Daily.      meclizine (ANTIVERT) 12.5 MG tablet Take 1 tablet by mouth 2 (Two) Times a Day As Needed for Dizziness. 30 tablet 0    Multiple Vitamins-Minerals (MULTIVITAMIN ADULT PO) Take 1 tablet/day by mouth.      rosuvastatin (CRESTOR) 40 MG tablet Take 1 tablet by mouth Daily. 90 tablet 2    Saw Palmetto 450 MG capsule Take  by mouth 2 (Two) Times a Day.       No current facility-administered medications for this visit.         The following portions of the patient's history were reviewed and updated as appropriate: allergies, current medications, past family history, past medical history, past social history, past surgical history and problem list.    Review of Systems:   A 14-point review of systems was performed. All systems negative except pertinent positives/negative listed in HPI  "above    Physical Exam:   Vitals:    05/15/25 1122   Temp: 97.5 °F (36.4 °C)   TempSrc: Temporal   Weight: 91.9 kg (202 lb 8 oz)   Height: 182.9 cm (72.01\")   PainSc: 6    PainLoc: Hip       General: A and O x 3, ASA, NAD    SCLERAE:    Normal    DENTITION:   Normal  Right hip exam unchanged        Assessment/Plan:    Right hip osteoarthritis      I again discussed conservative surgical treatment options the patient.  I do think he would be a candidate for right total hip replacement surgery.  Surgery was discussed with the patient however giving his current neurology workup I feel that takes precedence prior to elective surgery.  At this time we will plan for him just to follow-up after his workup with neurology is completed and await their eval and recommendations.  Also once his prostate surgery is also completed.  Patient will plan to follow-up in September during the interim if he wishes to try another hip injection that be reasonable but stated I would likely make that his last 1.  Continue conservative treatment measures.  All questions answered patient understands agrees with plan.      Disclaimer: Please note that areas of this note were completed with computer voice recognition software.  Quite often unanticipated grammatical, syntax, homophones, and other interpretive errors are inadvertently transcribed by the computer software. Please excuse any errors that have escaped final proofreading.      Warren Palacios MD  5/16/2025   "

## 2025-05-20 ENCOUNTER — HOSPITAL ENCOUNTER (OUTPATIENT)
Dept: PHYSICAL THERAPY | Facility: HOSPITAL | Age: 75
Setting detail: THERAPIES SERIES
Discharge: HOME | End: 2025-05-20
Payer: MEDICARE

## 2025-05-20 DIAGNOSIS — R42 VERTIGO: Primary | ICD-10-CM

## 2025-05-20 DIAGNOSIS — H81.10 BPPV (BENIGN PAROXYSMAL POSITIONAL VERTIGO), UNSPECIFIED LATERALITY: ICD-10-CM

## 2025-05-20 PROCEDURE — 97140 MANUAL THERAPY 1/> REGIONS: CPT

## 2025-05-20 PROCEDURE — 95992 CANALITH REPOSITIONING PROC: CPT

## 2025-05-20 PROCEDURE — 97110 THERAPEUTIC EXERCISES: CPT

## 2025-05-20 NOTE — THERAPY TREATMENT NOTE
"  Outpatient Physical Therapy Vestibular Treatment Note  [facility]     Patient Name: Jose Luis Davis  : 1950  MRN: [id number]  Today's Date: 2025      Visit Date: 2025    Visit Dx:     ICD-10-CM ICD-9-CM   1. Vertigo  R42 780.4   2. BPPV (benign paroxysmal positional vertigo), unspecified laterality  H81.10 386.11       Patient Active Problem List   Diagnosis    Ureterolithiasis    Benign prostatic hyperplasia with urinary hesitancy    Essential hypertension    Dyslipidemia    Osteoarthritis of multiple joints    Tension type headache    Migraine without aura    Vertigo    Benign paroxysmal positional vertigo    History of tobacco abuse    Elevated PSA    Need for immunization against influenza    Dyspnea    Medicare annual wellness visit, subsequent    Chest pain, atypical    Pulmonary nodule    Seasonal allergies    Lumbar back pain    Right hip pain        Vestibular Eval       Row Name 25 0700             Positional Testing    Positional Testing Without infrared goggles  -MP      Vertebrobasilar Artery Screen - Right Negative  -MP      Vertebrobasilar Artery Screen - Left Negative  -MP      Alva-Hallpike Right Upbeat, right rotatory nystagmus  -MP      Alva-Hallpike Right Onset Time  2sec  -MP      Sallie-Hallpike Right Duration Time  2sec  -MP                User Key  (r) = Recorded By, (t) = Taken By, (c) = Cosigned By      Initials Name Provider Type    Heydi Christopher, PT Physical Therapist                                 PT Assessment/Plan       Row Name 25 0700          PT Assessment    Assessment Comments Jose Luis returns to clinic for first follow up from initial evaluation for treatment of BPPV with additional complaints of headaches/\"funny\" feeling in head. Reassessed BPPV to begin session, noted very brief nystagmus in R Alva Hallpike. Repeated Epley/CRMx1 with (-) retest. Initiated gentle manual and LS stretch to address cervicogenic component of headaches and " "dizziness. Tolerated well, discussed referral patterns. Pt. remains appropriate for skilled PT.  -MP        PT Plan    PT Plan Comments take c-spine ROM measurements and develop goals  -MP               User Key  (r) = Recorded By, (t) = Taken By, (c) = Cosigned By      Initials Name Provider Type    Heydi Christopher PT Physical Therapist                        OP Exercises       Row Name 05/20/25 0700             Subjective    Subjective Comments Whatever you did last time seemed to help. The \"funny\" feeling is different from dizzy/swimmy which I have had light dizziness and swimminess since last visit. The headaches and funny feeling are about the same. Couple instances of dizziness/swimmins senation with reaching up/looking up to get bowl down and when bending forward in shower to  soap.  -MP         Subjective Pain    Able to rate subjective pain? yes  -MP      Pre-Treatment Pain Level 0  -MP      Post-Treatment Pain Level 0  -MP         Total Minutes    17992 - PT Therapeutic Exercise Minutes 5  including education re: exercises, PT role and POC  -MP      08106 - PT Manual Therapy Minutes 20  -MP         Exercise 2    Exercise Name 2 Epley/CRM  -MP      Cueing 2 Verbal;Demo  -MP      Sets 2 1  -MP      Reps 2 1  -MP      Time 2 R PC  -MP      Additional Comments (-)  -MP         Exercise 3    Exercise Name 3 levator stretch  -MP      Cueing 3 Verbal;Demo  -MP      Reps 3 2e  -MP      Time 3 20s  -MP                User Key  (r) = Recorded By, (t) = Taken By, (c) = Cosigned By      Initials Name Provider Type    Heydi Christopher PT Physical Therapist                  Manual Rx (Last 36 Hours)       Manual Treatments       Row Name 05/20/25 0700             Total Minutes    31700 - PT Manual Therapy Minutes 20  -MP         Manual Rx 1    Manual Rx 1 Location seated STM B UT/LS, cervical paraspinals, trigger point release  -MP                User Key  (r) = Recorded By, (t) = Taken By, (c) = " Cosigned By      Initials Name Provider Type    Heydi Christopher PT Physical Therapist                               PT OP Goals       Row Name 25 0800          PT Short Term Goals    STG Date to Achieve 25  -MP     STG 1 Pt. will be (-) for BPPV in R posterior canal to reduce symptoms of dizziness.  -MP     STG 1 Progress Ongoing  -MP        Long Term Goals    LTG Date to Achieve 25  -MP     LTG 1 Pt. will remain (-) for BPPV in all canals to maintain improved QOL.  -MP     LTG 1 Progress Ongoing  -MP               User Key  (r) = Recorded By, (t) = Taken By, (c) = Cosigned By      Initials Name Provider Type    Heydi Christopher PT Physical Therapist                    Therapy Education  Education Details: post-maneuver recommendations; role of musculature in referral patterns, role of PT and exercise in addressing cervicogenic component of headaches and dizziness; HEP [id number]  Given: Symptoms/condition management, HEP  Program: New  How Provided: Verbal, Demonstration, Written  Provided to: Patient  Level of Understanding: Verbalized              Time Calculation:   Start Time: 735  Stop Time: 812  Time Calculation (min): 37 min  Total Timed Code Minutes- PT: 25 minute(s)  Timed Charges  94047 - PT Therapeutic Exercise Minutes: 5 (including education re: exercises, PT role and POC)  64400 - PT Manual Therapy Minutes: 20  Untimed Charges  PT Canalith Repositionin  Total Minutes  Timed Charges Total Minutes: 25  Untimed Charges Total Minutes: 12   Total Minutes: 37   Therapy Charges for Today       Code Description Service Date Service Provider Modifiers Qty    06303602006 HC PT THER PROC EA 15 MIN 2025 Heydi Patel, PT GP 1    02908000841 HC PT MANUAL THERAPY EA 15 MIN 2025 Heydi Patel, PT GP 1    70006636289 HC PT CANALITH REPOSITIONING PER DAY 2025 Heydi Patel, PT GP 1                     Heydi Patel PT  2025

## 2025-05-21 ENCOUNTER — OFFICE VISIT (OUTPATIENT)
Dept: FAMILY MEDICINE CLINIC | Facility: CLINIC | Age: 75
End: 2025-05-21
Payer: MEDICARE

## 2025-05-21 VITALS
BODY MASS INDEX: 27.33 KG/M2 | DIASTOLIC BLOOD PRESSURE: 84 MMHG | WEIGHT: 201.8 LBS | HEART RATE: 78 BPM | TEMPERATURE: 98.4 F | OXYGEN SATURATION: 98 % | HEIGHT: 72 IN | SYSTOLIC BLOOD PRESSURE: 130 MMHG

## 2025-05-21 DIAGNOSIS — I10 ESSENTIAL HYPERTENSION: ICD-10-CM

## 2025-05-21 DIAGNOSIS — H81.10 BENIGN PAROXYSMAL POSITIONAL VERTIGO, UNSPECIFIED LATERALITY: ICD-10-CM

## 2025-05-21 DIAGNOSIS — N40.1 BENIGN PROSTATIC HYPERPLASIA WITH URINARY HESITANCY: ICD-10-CM

## 2025-05-21 DIAGNOSIS — R39.11 BENIGN PROSTATIC HYPERPLASIA WITH URINARY HESITANCY: ICD-10-CM

## 2025-05-21 DIAGNOSIS — E78.5 DYSLIPIDEMIA: ICD-10-CM

## 2025-05-21 DIAGNOSIS — G44.209 ACUTE NON INTRACTABLE TENSION-TYPE HEADACHE: ICD-10-CM

## 2025-05-21 DIAGNOSIS — Z00.00 MEDICARE ANNUAL WELLNESS VISIT, SUBSEQUENT: Primary | ICD-10-CM

## 2025-05-21 LAB
CHOLEST SERPL-MCNC: 113 MG/DL (ref 0–200)
HDLC SERPL-MCNC: 45 MG/DL (ref 40–60)
LDLC SERPL CALC-MCNC: 53 MG/DL (ref 0–100)
TRIGL SERPL-MCNC: 71 MG/DL (ref 0–150)
VLDLC SERPL CALC-MCNC: 15 MG/DL (ref 5–40)

## 2025-05-21 NOTE — ASSESSMENT & PLAN NOTE
Hypertension is stable and controlled  Dietary sodium restriction.  Weight loss.  Regular aerobic exercise.  Blood pressure will be reassessed in 6 months.

## 2025-05-21 NOTE — ASSESSMENT & PLAN NOTE
Counseling was provided on nutrition, physical activity, development, and injury prevention, dental health, and safe sex practices patient verbalizes understanding no additional questions were asked.

## 2025-05-21 NOTE — PROGRESS NOTES
Subjective   The ABCs of the Annual Wellness Visit  Medicare Wellness Visit      Jose Luis Davis is a 74 y.o. patient who presents for a Medicare Wellness Visit.    The following portions of the patient's history were reviewed and   updated as appropriate: allergies, current medications, past family history, past medical history, past social history, past surgical history, and problem list.    Compared to one year ago, the patient's physical   health is the same.  Compared to one year ago, the patient's mental   health is the same.    Recent Hospitalizations:  He was not admitted to the hospital during the last year.     Current Medical Providers:  Patient Care Team:  Shanae Dash APRN as PCP - General (Nurse Practitioner)  Melanie Castro MD as Consulting Physician (Cardiology)  Cari Alegre Jr., MD as Consulting Physician (Vascular Surgery)  Jacklyn Tijerina RN as Ambulatory  ([facility])  Barbara West DO as Consulting Physician (Orthopedic Surgery)  Warren Palacios MD as Consulting Physician (Orthopedic Surgery)  Acosta Gongora MD as Consulting Physician (Urology)    Outpatient Medications Prior to Visit   Medication Sig Dispense Refill    APPLE CIDER VINEGAR PO Take  by mouth Daily. Liquid blend      Coenzyme Q10 (CO Q 10 PO) Take 200 mg by mouth Daily.      glucosamine sulfate 500 MG capsule capsule Take 1 capsule by mouth 3 (Three) Times a Day With Meals. EZ move      Magnesium 400 MG tablet Take 400 mg by mouth Daily.      meclizine (ANTIVERT) 12.5 MG tablet Take 1 tablet by mouth 2 (Two) Times a Day As Needed for Dizziness. 30 tablet 0    Multiple Vitamins-Minerals (MULTIVITAMIN ADULT PO) Take 1 tablet/day by mouth.      rosuvastatin (CRESTOR) 40 MG tablet Take 1 tablet by mouth Daily. 90 tablet 2    Saw Palmetto 450 MG capsule Take  by mouth 2 (Two) Times a Day.       No facility-administered medications prior to visit.     No opioid medication  "identified on active medication list. I have reviewed chart for other potential  high risk medication/s and harmful drug interactions in the elderly.      Aspirin is not on active medication list.  Aspirin use is not indicated based on review of current medical condition/s. Risk of harm outweighs potential benefits.  .    Patient Active Problem List   Diagnosis    Ureterolithiasis    Benign prostatic hyperplasia with urinary hesitancy    Essential hypertension    Dyslipidemia    Osteoarthritis of multiple joints    Tension type headache    Migraine without aura    Vertigo    Benign paroxysmal positional vertigo    History of tobacco abuse    Elevated PSA    Need for immunization against influenza    Dyspnea    Medicare annual wellness visit, subsequent    Chest pain, atypical    Pulmonary nodule    Seasonal allergies    Lumbar back pain    Right hip pain     Advance Care Planning Advance Directive is not on file.  ACP discussion was held with the patient during this visit. Patient has an advance directive (not in EMR), copy requested.            Objective   Vitals:    05/21/25 1309   BP: 130/84   BP Location: Left arm   Patient Position: Sitting   Cuff Size: Adult   Pulse: 78   Temp: 98.4 °F (36.9 °C)   SpO2: 98%   Weight: 91.5 kg (201 lb 12.8 oz)   Height: 182.9 cm (72.01\")       Estimated body mass index is 27.36 kg/m² as calculated from the following:    Height as of this encounter: 182.9 cm (72.01\").    Weight as of this encounter: 91.5 kg (201 lb 12.8 oz).                Does the patient have evidence of cognitive impairment? No                                                                                                Health  Risk Assessment    Smoking Status:  Social History     Tobacco Use   Smoking Status Former    Passive exposure: Never   Smokeless Tobacco Never   Tobacco Comments    quit about 30 yrs ago     Alcohol Consumption:  Social History     Substance and Sexual Activity   Alcohol Use Yes    " Alcohol/week: 6.0 standard drinks of alcohol    Types: 6 Cans of beer per week       Fall Risk Screen  OLEG Fall Risk Assessment was completed, and patient is at LOW risk for falls.Assessment completed on:2025    Depression Screening   Little interest or pleasure in doing things? Not at all   Feeling down, depressed, or hopeless? Not at all   PHQ-2 Total Score 0      Health Habits and Functional and Cognitive Screenin/21/2025     1:12 PM   Functional & Cognitive Status   Do you have difficulty preparing food and eating? No   Do you have difficulty bathing yourself, getting dressed or grooming yourself? No   Do you have difficulty using the toilet? No   Do you have difficulty moving around from place to place? No   Do you have trouble with steps or getting out of a bed or a chair? No   Current Diet Well Balanced Diet   Dental Exam Up to date   Eye Exam Not up to date   Exercise (times per week) 1 times per week   Current Exercises Include Weightlifting;Aerobics;Stationary Bicycling/Spin Class   Do you need help using the phone?  No   Are you deaf or do you have serious difficulty hearing?  Yes   Do you need help to go to places out of walking distance? Yes   Do you need help shopping? No   Do you need help preparing meals?  No   Do you need help with housework?  No   Do you need help with laundry? No   Do you need help taking your medications? No   Do you need help managing money? No   Do you ever drive or ride in a car without wearing a seat belt? No   Have you felt unusual stress, anger or loneliness in the last month? No   Who do you live with? Alone   If you need help, do you have trouble finding someone available to you? No   Have you been bothered in the last four weeks by sexual problems? No   Do you have difficulty concentrating, remembering or making decisions? Yes           Age-appropriate Screening Schedule:  Refer to the list below for future screening recommendations based on patient's  "age, sex and/or medical conditions. Orders for these recommended tests are listed in the plan section. The patient has been provided with a written plan.    Health Maintenance List  Health Maintenance   Topic Date Due    COVID-19 Vaccine (6 - 2024-25 season) 04/28/2025    INFLUENZA VACCINE  07/01/2025    ANNUAL WELLNESS VISIT  05/21/2026    TDAP/TD VACCINES (2 - Td or Tdap) 04/28/2027    COLORECTAL CANCER SCREENING  04/28/2027    HEPATITIS C SCREENING  Completed    Pneumococcal Vaccine 50+  Completed    AAA SCREEN ONCE  Completed    ZOSTER VACCINE  Addressed                                                                                                                                                CMS Preventative Services Quick Reference  Risk Factors Identified During Encounter  Immunizations Discussed/Encouraged: Tdap, Prevnar 20 (Pneumococcal 20-valent conjugate), COVID19, and RSV (Respiratory Syncytial Virus)  Dental Screening Recommended  Vision Screening Recommended    The above risks/problems have been discussed with the patient.  Pertinent information has been shared with the patient in the After Visit Summary.  An After Visit Summary and PPPS were made available to the patient.    Follow Up:   Next Medicare Wellness visit to be scheduled in 1 year.         Additional E&M Note during same encounter follows:  Patient has additional, significant, and separately identifiable condition(s)/problem(s) that require work above and beyond the Medicare Wellness Visit     Chief Complaint  Medicare Wellness-subsequent (FASTING) and Dizziness (Follow up-Meclizine is helping, symptoms are more mild/Still experiencing \"funny feeling\" /)    Subjective   Patient presents office today for a Medicare wellness visit.  Patient is following up on meclizine 12.5 mg 4 dizziness.  Blood pressure 130/84.  He reports symptoms have improved.      Jose Luis is also being seen today for an annual adult preventative physical exam.  and " "Jose Luis is also being seen today for additional medical problem/s.    Review of Systems   Constitutional:  Negative for activity change, appetite change and fatigue.   HENT:  Negative for congestion, sinus pressure and tinnitus.    Eyes:  Negative for visual disturbance.   Respiratory:  Negative for cough and shortness of breath.    Cardiovascular:  Negative for chest pain, palpitations and leg swelling.   Gastrointestinal:  Negative for constipation and diarrhea.   Endocrine: Negative for cold intolerance.   Genitourinary:  Negative for flank pain and frequency.   Musculoskeletal:  Negative for arthralgias and neck pain.   Skin:  Negative for rash.   Allergic/Immunologic: Negative for environmental allergies.   Neurological:  Negative for dizziness, tremors, seizures, syncope, weakness and light-headedness.   Psychiatric/Behavioral:  The patient is not nervous/anxious.               Objective   Vital Signs:  /84 (BP Location: Left arm, Patient Position: Sitting, Cuff Size: Adult)   Pulse 78   Temp 98.4 °F (36.9 °C)   Ht 182.9 cm (72.01\")   Wt 91.5 kg (201 lb 12.8 oz)   SpO2 98%   BMI 27.36 kg/m²   Physical Exam  Constitutional:       General: He is not in acute distress.     Appearance: Normal appearance.   HENT:      Head: Normocephalic.      Right Ear: Tympanic membrane, ear canal and external ear normal.      Left Ear: Tympanic membrane, ear canal and external ear normal.      Nose: Nose normal.   Eyes:      Pupils: Pupils are equal, round, and reactive to light.   Cardiovascular:      Rate and Rhythm: Normal rate.      Pulses: Normal pulses.      Heart sounds: Normal heart sounds.   Pulmonary:      Effort: Pulmonary effort is normal.      Breath sounds: Normal breath sounds.   Abdominal:      General: Bowel sounds are normal.      Palpations: Abdomen is soft.   Musculoskeletal:         General: Normal range of motion.      Cervical back: Normal range of motion and neck supple.   Skin:     General: " Skin is warm.   Neurological:      General: No focal deficit present.      Mental Status: He is alert and oriented to person, place, and time.   Psychiatric:         Mood and Affect: Mood normal.         Behavior: Behavior normal.         Thought Content: Thought content normal.         Judgment: Judgment normal.         The following data was reviewed by: NICHOLAS Palafox on 05/21/2025:    Common labs          10/7/2024    10:55 12/14/2024    14:16 12/14/2024    15:13 3/27/2025    06:37   Common Labs   Glucose 111   88  105    BUN 15   18  22    Creatinine 0.92   0.94  0.87    Sodium 142   142  142    Potassium 5.1   3.9  4.0    Chloride 105   108  106    Calcium 9.9   9.6  9.6    Albumin 4.8   4.1  4.6    Total Bilirubin 0.8   0.7  0.7    Alkaline Phosphatase 99   83  93    AST (SGOT) 27   26  29    ALT (SGPT) 33   33  35    WBC  6.89   5.63    Hemoglobin  14.7   13.9    Hematocrit  42.2   40.1    Platelets  266   248    Total Cholesterol 133       Triglycerides 67       HDL Cholesterol 49       LDL Cholesterol  70              Assessment and Plan Additional age appropriate preventative wellness advice topics were discussed during today's preventative wellness exam(some topics already addressed during AWV portion of the note above):    Physical Activity: Advised cardiovascular activity 150 minutes per week as tolerated. (example brisk walk for 30 minutes, 5 days a week).     Nutrition: Discussed nutrition plan with patient. Information shared in after visit summary. Goal is for a well balanced diet to enhance overall health.     Healthy Weight: Discussed current and goal BMI with patient. Steps to attain this goal discussed. Information shared in after visit summary.     Gun Safety Awareness Discussion: Information Shared in after visit summary.     Tobacco Misuse Discussion: Information shared in after visit summary.     Alcohol Misuse Discussion: Information shared in after visit summary.     Drug Misuse  Discussion:  Avoidance of Drug Use recommendation given.  Information shared in after visit summary.     Motor Vehicle Safety Discussion:  Wearing Seatbelt While in Motor Vehicle recommendation. Adhering to posted speed limit recommendation.     Sexual Behavior/Sexual Safety Discussion: Information shared in after visit summary.     Injury Prevention Discussion:  Information shared in after visit summary.           Medicare annual wellness visit, subsequent  Counseling was provided on nutrition, physical activity, development, and injury prevention, dental health, and safe sex practices patient verbalizes understanding no additional questions were asked.           Dyslipidemia  Continue crestor, working on healthy diet and exercise.   Orders:    Lipid panel    Essential hypertension  Hypertension is stable and controlled  Dietary sodium restriction.  Weight loss.  Regular aerobic exercise.  Blood pressure will be reassessed in 6 months.         Benign paroxysmal positional vertigo, unspecified laterality  Stable, following physical therapy. Taking meclizine as needed.        Benign prostatic hyperplasia with urinary hesitancy  Stable, follows [facility].         Acute non intractable tension-type headache  Headaches are stable.    Plan:  Continue same medication/s without change.     Discussed medication dosage, use, side effects, and goals of treatment in detail.    Discussed monitoring symptoms and use of quick-relief medications and maintenance medication.    General Treatment Goals:   symptom prevention  minimize work absence  minimizing limitation in activity  prevention of exacerbations  decrease use of ER/inpatient care  minimization of adverse effects of treatment    Followup in 6 months      Stable follows neurology                       I spent 30 minutes caring for Jose Luis on this date of service. This time includes time spent by me in the following activities:preparing for the visit, reviewing tests,  obtaining and/or reviewing a separately obtained history, performing a medically appropriate examination and/or evaluation , counseling and educating the patient/family/caregiver, ordering medications, tests, or procedures, documenting information in the medical record, independently interpreting results and communicating that information with the patient/family/caregiver, and care coordination  Follow Up   Return in about 5 months (around 11/3/2025) for Recheck.  Patient was given instructions and counseling regarding his condition or for health maintenance advice. Please see specific information pulled into the AVS if appropriate.

## 2025-05-21 NOTE — ASSESSMENT & PLAN NOTE
Headaches are stable.    Plan:  Continue same medication/s without change.     Discussed medication dosage, use, side effects, and goals of treatment in detail.    Discussed monitoring symptoms and use of quick-relief medications and maintenance medication.    General Treatment Goals:   symptom prevention  minimize work absence  minimizing limitation in activity  prevention of exacerbations  decrease use of ER/inpatient care  minimization of adverse effects of treatment    Followup in 6 months      Stable follows neurology

## 2025-05-22 ENCOUNTER — APPOINTMENT (OUTPATIENT)
Dept: PHYSICAL THERAPY | Facility: HOSPITAL | Age: 75
End: 2025-05-22
Payer: MEDICARE

## 2025-05-29 ENCOUNTER — HOSPITAL ENCOUNTER (OUTPATIENT)
Dept: PHYSICAL THERAPY | Facility: HOSPITAL | Age: 75
Setting detail: THERAPIES SERIES
Discharge: HOME | End: 2025-05-29
Payer: MEDICARE

## 2025-05-29 DIAGNOSIS — H81.10 BPPV (BENIGN PAROXYSMAL POSITIONAL VERTIGO), UNSPECIFIED LATERALITY: ICD-10-CM

## 2025-05-29 DIAGNOSIS — R42 VERTIGO: Primary | ICD-10-CM

## 2025-05-29 PROCEDURE — 97140 MANUAL THERAPY 1/> REGIONS: CPT

## 2025-05-29 PROCEDURE — 97110 THERAPEUTIC EXERCISES: CPT

## 2025-05-29 NOTE — THERAPY TREATMENT NOTE
"  Outpatient Physical Therapy Vestibular Treatment Note  [facility]     Patient Name: Jose Luis Davis  : 1950  MRN: [id number]  Today's Date: 2025      Visit Date: 2025    Visit Dx:     ICD-10-CM ICD-9-CM   1. Vertigo  R42 780.4   2. BPPV (benign paroxysmal positional vertigo), unspecified laterality  H81.10 386.11       Patient Active Problem List   Diagnosis    Ureterolithiasis    Benign prostatic hyperplasia with urinary hesitancy    Essential hypertension    Dyslipidemia    Osteoarthritis of multiple joints    Tension type headache    Migraine without aura    Vertigo    Benign paroxysmal positional vertigo    History of tobacco abuse    Elevated PSA    Need for immunization against influenza    Dyspnea    Medicare annual wellness visit, subsequent    Chest pain, atypical    Pulmonary nodule    Seasonal allergies    Lumbar back pain    Right hip pain                        PT Assessment/Plan       Row Name 25 0800          PT Assessment    Assessment Comments Pt. reports near resolution of dizzinesss and \"funny\" feeling he has previously expressed. He reports compliance with initial LS stretch with reduction in headaches. Continued focus on cervicogenic component as BPPV screen (-) at conclusion of last visit with continued improvement in vertigo. Spent increased time on manual with UT/SCM stretch, suboccipital release and gentle c-spine traction in supine. Added supine chin tuck and UT stretch to HEP.  -MP        PT Plan    PT Plan Comments SNAG?  -MP               User Key  (r) = Recorded By, (t) = Taken By, (c) = Cosigned By      Initials Name Provider Type    Heydi Christopher, PT Physical Therapist                        OP Exercises       Row Name 25 0800             Subjective    Subjective Comments I have only had maybe very slight, slight dizziness and the last 3-4 days probably nothing. Not even the funny feeling. Even the headaches are better with that stretch you " gave me.  -MP         Subjective Pain    Able to rate subjective pain? yes  -MP      Pre-Treatment Pain Level 0  -MP      Post-Treatment Pain Level 0  -MP         Total Minutes    94598 - PT Therapeutic Exercise Minutes 15  -MP      51432 - PT Manual Therapy Minutes 25  -MP         Exercise 1    Exercise Name 1 UBE  -MP      Cueing 1 Verbal  -MP      Time 1 5 min  -MP         Exercise 2    Exercise Name 2 UT stretch  -MP      Cueing 2 Verbal;Demo  -MP      Reps 2 3e  -MP      Time 2 20s  -MP         Exercise 3    Exercise Name 3 levator stretch  -MP      Cueing 3 Verbal;Demo  -MP      Reps 3 3e  -MP      Time 3 20s  -MP         Exercise 4    Exercise Name 4 supine chin tuck w/ towel roll  -MP      Cueing 4 Verbal;Demo  -MP      Reps 4 15  -MP      Time 4 5s  -MP                User Key  (r) = Recorded By, (t) = Taken By, (c) = Cosigned By      Initials Name Provider Type    MP Patkarriet, Heydi, PT Physical Therapist                  Manual Rx (Last 36 Hours)       Manual Treatments       Row Name 05/29/25 0800             Total Minutes    68282 - PT Manual Therapy Minutes 25  -MP         Manual Rx 1    Manual Rx 1 Location seated and supine STM B UT/LS, cervical paraspinals, trigger point release, supine gentle cervical traction and suboccipital release  -MP         Manual Rx 2    Manual Rx 2 Location B UT/SCM stretch [id number]  -MP                User Key  (r) = Recorded By, (t) = Taken By, (c) = Cosigned By      Initials Name Provider Type    MP Patzelt, Heydi, PT Physical Therapist                               PT OP Goals       Row Name 05/29/25 0800          PT Short Term Goals    STG Date to Achieve 06/13/25  -MP     STG 1 Pt. will be (-) for BPPV in R posterior canal to reduce symptoms of dizziness.  -MP     STG 1 Progress Met  -MP        Long Term Goals    LTG Date to Achieve 07/13/25  -MP     LTG 1 Pt. will remain (-) for BPPV in all canals to maintain improved QOL.  -MP     LTG 1 Progress Ongoing  -MP      LTG 2 Pt. will report no headaches x1 week to improve QOL.  -MP     LTG 2 Progress New  -MP     LTG 3 Pt. will return to sleeping on side/back without symptom onset to improve sleep quality.  -MP     LTG 3 Progress New  -MP               User Key  (r) = Recorded By, (t) = Taken By, (c) = Cosigned By      Initials Name Provider Type    Heydi Christopher, PT Physical Therapist                    Therapy Education  Education Details: Updated HEP [id number]  Given: HEP, Symptoms/condition management  Program: Reinforced, Progressed  How Provided: Verbal, Demonstration, Written  Provided to: Patient  Level of Understanding: Verbalized, Demonstrated              Time Calculation:   Start Time: 0818  Stop Time: 0858  Time Calculation (min): 40 min  Total Timed Code Minutes- PT: 40 minute(s)  Timed Charges  32997 - PT Therapeutic Exercise Minutes: 15  18528 - PT Manual Therapy Minutes: 25  Total Minutes  Timed Charges Total Minutes: 40   Total Minutes: 40   Therapy Charges for Today       Code Description Service Date Service Provider Modifiers Qty    55630923196 HC PT MANUAL THERAPY EA 15 MIN 5/29/2025 Heydi Patel, PT GP 2    06015985350 HC PT THER PROC EA 15 MIN 5/29/2025 Heydi Patel, PT GP 1                     Heydi Patel PT  5/29/2025

## 2025-06-02 ENCOUNTER — PATIENT OUTREACH (OUTPATIENT)
Dept: CASE MANAGEMENT | Facility: OTHER | Age: 75
End: 2025-06-02
Payer: MEDICARE

## 2025-06-02 NOTE — OUTREACH NOTE
AMBULATORY CASE MANAGEMENT NOTE    Names and Relationships of Patient/Support Persons: Contact: Jose Luis Davis W; Relationship: Self -     Adult Patient Profile  Questions/Answers      Flowsheet Row Most Recent Value   Symptoms/Conditions Managed at Home musculoskeletal, neurological   Musculoskeletal Symptoms/Conditions joint pain   Musculoskeletal Management Strategies adequate rest, exercise, other (see comments)  [steroid injections]   Musculoskeletal Self-Management Outcome unsure   Musculoskeletal Comment has follow up in September to discuss surgery for hip pain   Neurological Symptoms/Conditions vertigo   Neurological Management Strategies medication therapy   Neurological Self-Management Outcome unsure   Neurological Comment states PT is helping headaches, has MRI scheduled and neuro consult scheduled   Barriers to Taking Medication as Prescribed none          Patient Outreach    Follow up outreach with patient returning ACM's call today.  He prefers an outreach in about one month for follow up.  Reviewed scheduled appointments.      Jacklyn VALDES  Ambulatory Case Management    6/2/2025, 14:12 EDT

## 2025-06-04 ENCOUNTER — HOSPITAL ENCOUNTER (OUTPATIENT)
Dept: PHYSICAL THERAPY | Facility: HOSPITAL | Age: 75
Setting detail: THERAPIES SERIES
Discharge: HOME | End: 2025-06-04
Payer: MEDICARE

## 2025-06-04 DIAGNOSIS — H81.10 BPPV (BENIGN PAROXYSMAL POSITIONAL VERTIGO), UNSPECIFIED LATERALITY: ICD-10-CM

## 2025-06-04 DIAGNOSIS — R42 VERTIGO: Primary | ICD-10-CM

## 2025-06-04 PROCEDURE — 97110 THERAPEUTIC EXERCISES: CPT

## 2025-06-04 PROCEDURE — 97140 MANUAL THERAPY 1/> REGIONS: CPT

## 2025-06-04 NOTE — THERAPY TREATMENT NOTE
"  Outpatient Physical Therapy Ortho Treatment Note  [facility]     Patient Name: Jose Luis Davis  : 1950  MRN: [id number]  Today's Date: 2025      Visit Date: 2025    Visit Dx:    ICD-10-CM ICD-9-CM   1. Vertigo  R42 780.4   2. BPPV (benign paroxysmal positional vertigo), unspecified laterality  H81.10 386.11       Patient Active Problem List   Diagnosis    Ureterolithiasis    Benign prostatic hyperplasia with urinary hesitancy    Essential hypertension    Dyslipidemia    Osteoarthritis of multiple joints    Tension type headache    Migraine without aura    Vertigo    Benign paroxysmal positional vertigo    History of tobacco abuse    Elevated PSA    Need for immunization against influenza    Dyspnea    Medicare annual wellness visit, subsequent    Chest pain, atypical    Pulmonary nodule    Seasonal allergies    Lumbar back pain    Right hip pain        Past Medical History:   Diagnosis Date    Essential hypertension 2017    white coat syndrome    HLD (hyperlipidemia)     Kidney stone     Osteoarthritis of multiple joints 2017        Past Surgical History:   Procedure Laterality Date    CYSTOSCOPY URETEROSCOPY LASER LITHOTRIPSY Left 3/12/2016    Procedure: Cyto, Trans urethral left ureterral orifice incision, left ureteroscopy, holmium laser lithotripsy, basket extraction of stone fragment, placement of left double J ureteral stent. ;  Surgeon: Nathan Freeman Jr., MD;  Location: [facility] OR;  Service:     URETEROSCOPY LASER LITHOTRIPSY WITH STENT INSERTION Right 2025    Procedure: RIGHT URETEROSCOPY LASER LITHOTRIPSY STONE BASKET EXTRACTION STENT PLACEMENT;  Surgeon: Acosta Gongora MD;  Location: [facility] MAIN OR;  Service: Urology;  Laterality: Right;                        PT Assessment/Plan       Row Name 25 1100          PT Assessment    Assessment Comments Pt. reports resolution of dizziness, reduction in \"funny\" feeling only very brief instances 2-3x since " last visits. Pt. reports 3 headaches but only required medication twice, headaches are less intense and frequent and able to lay in bed/recliner for longer period of time before onset of symptoms. Added cervical SNAG rotation and extension, continued with manual and discussed use of towel to improve comfort with sleeping position.  -MP        PT Plan    PT Plan Comments c-spine iso?  -MP               User Key  (r) = Recorded By, (t) = Taken By, (c) = Cosigned By      Initials Name Provider Type    Heydi Christopher, PT Physical Therapist                       OP Exercises       Row Name 06/04/25 1100             Subjective    Subjective Comments I say the dizziness is gone. That funny feeling only very brief and a few times. The headaches I have had 3 times, only 2x have I needed excederin and not as intense so they are getting better. The headaches are less frequent and less severe and it seems like I can lay on my bed or receliner for longer before that pain at the back of my head starts.  -MP         Subjective Pain    Able to rate subjective pain? yes  -MP      Pre-Treatment Pain Level 0  -MP      Post-Treatment Pain Level 0  -MP         Total Minutes    99660 - PT Therapeutic Exercise Minutes 25  -MP      45900 - PT Manual Therapy Minutes 15  -MP         Exercise 1    Exercise Name 1 UBE  -MP      Cueing 1 Verbal  -MP      Time 1 5 min  -MP         Exercise 2    Exercise Name 2 UT stretch  -MP      Cueing 2 Verbal;Demo  -MP      Reps 2 3e  -MP      Time 2 20s  -MP         Exercise 3    Exercise Name 3 levator stretch  -MP      Cueing 3 Verbal;Demo  -MP      Reps 3 3e  -MP      Time 3 20s  -MP         Exercise 5    Exercise Name 5 cervical SNAG rotation  -MP      Cueing 5 Verbal;Demo  -MP      Reps 5 10ea  -MP      Time 5 5s  -MP         Exercise 6    Exercise Name 6 cervical SNAG extension  -MP      Cueing 6 Verbal;Demo  -MP      Reps 6 10  -MP      Time 6 5s  -MP                User Key  (r) = Recorded By,  (t) = Taken By, (c) = Cosigned By      Initials Name Provider Type    MP Heydi Patel, PT Physical Therapist                             Manual Rx (Last 36 Hours)       Manual Treatments       Row Name 06/04/25 1100             Total Minutes    55586 - PT Manual Therapy Minutes 15  -MP         Manual Rx 1    Manual Rx 1 Location seated and supine STM B UT/LS, cervical paraspinals, trigger point release, supine gentle cervical traction and suboccipital release  -MP         Manual Rx 2    Manual Rx 2 Location B UT/SCM stretch [id number]  -MP                User Key  (r) = Recorded By, (t) = Taken By, (c) = Cosigned By      Initials Name Provider Type    MP Heydi Patel, PT Physical Therapist                     PT OP Goals       Row Name 06/04/25 1200          PT Short Term Goals    STG Date to Achieve 06/13/25  -MP     STG 1 Pt. will be (-) for BPPV in R posterior canal to reduce symptoms of dizziness.  -MP     STG 1 Progress Met  -MP        Long Term Goals    LTG Date to Achieve 07/13/25  -MP     LTG 1 Pt. will remain (-) for BPPV in all canals to maintain improved QOL.  -MP     LTG 1 Progress Ongoing  -MP     LTG 2 Pt. will report no headaches x1 week to improve QOL.  -MP     LTG 2 Progress Ongoing  -MP     LTG 3 Pt. will return to sleeping on side/back without symptom onset to improve sleep quality.  -MP     LTG 3 Progress Ongoing  -MP               User Key  (r) = Recorded By, (t) = Taken By, (c) = Cosigned By      Initials Name Provider Type    Heydi Christopher, PT Physical Therapist                    Therapy Education  Education Details: Updated HEP [id number]; use fo towel roll for comfort with sleeping position  Given: Symptoms/condition management  Program: Reinforced, Progressed  How Provided: Verbal, Demonstration, Written  Provided to: Patient  Level of Understanding: Demonstrated, Verbalized              Time Calculation:   Start Time: 1118  Stop Time: 1158  Time Calculation (min): 40 min  Total  Timed Code Minutes- PT: 40 minute(s)  Timed Charges  73575 - PT Therapeutic Exercise Minutes: 25  22655 - PT Manual Therapy Minutes: 15  Total Minutes  Timed Charges Total Minutes: 40   Total Minutes: 40  Therapy Charges for Today       Code Description Service Date Service Provider Modifiers Qty    13591557930  PT MANUAL THERAPY EA 15 MIN 6/4/2025 Heydi Patel, PT GP 1    84642937746 HC PT THER PROC EA 15 MIN 6/4/2025 Heydi Patel, PT GP 2                      Heydi Patel, PT  6/4/2025

## 2025-06-08 ENCOUNTER — HOSPITAL ENCOUNTER (OUTPATIENT)
Facility: HOSPITAL | Age: 75
Discharge: HOME | End: 2025-06-08
Admitting: NURSE PRACTITIONER
Payer: MEDICARE

## 2025-06-08 DIAGNOSIS — R42 VERTIGO: ICD-10-CM

## 2025-06-08 DIAGNOSIS — G44.209 ACUTE NON INTRACTABLE TENSION-TYPE HEADACHE: ICD-10-CM

## 2025-06-08 PROCEDURE — 70551 MRI BRAIN STEM W/O DYE: CPT

## 2025-06-10 ENCOUNTER — HOSPITAL ENCOUNTER (OUTPATIENT)
Dept: PHYSICAL THERAPY | Facility: HOSPITAL | Age: 75
Setting detail: THERAPIES SERIES
Discharge: HOME | End: 2025-06-10
Payer: MEDICARE

## 2025-06-10 DIAGNOSIS — R42 VERTIGO: Primary | ICD-10-CM

## 2025-06-10 DIAGNOSIS — H81.10 BPPV (BENIGN PAROXYSMAL POSITIONAL VERTIGO), UNSPECIFIED LATERALITY: ICD-10-CM

## 2025-06-10 PROCEDURE — 97110 THERAPEUTIC EXERCISES: CPT

## 2025-06-10 PROCEDURE — 97140 MANUAL THERAPY 1/> REGIONS: CPT

## 2025-06-10 NOTE — THERAPY TREATMENT NOTE
Outpatient Physical Therapy Ortho Treatment Note  [facility]     Patient Name: Jose Luis Davis  : 1950  MRN: [id number]  Today's Date: 6/10/2025      Visit Date: 06/10/2025    Visit Dx:    ICD-10-CM ICD-9-CM   1. Vertigo  R42 780.4   2. BPPV (benign paroxysmal positional vertigo), unspecified laterality  H81.10 386.11       Patient Active Problem List   Diagnosis    Ureterolithiasis    Benign prostatic hyperplasia with urinary hesitancy    Essential hypertension    Dyslipidemia    Osteoarthritis of multiple joints    Tension type headache    Migraine without aura    Vertigo    Benign paroxysmal positional vertigo    History of tobacco abuse    Elevated PSA    Need for immunization against influenza    Dyspnea    Medicare annual wellness visit, subsequent    Chest pain, atypical    Pulmonary nodule    Seasonal allergies    Lumbar back pain    Right hip pain        Past Medical History:   Diagnosis Date    Essential hypertension 2017    white coat syndrome    HLD (hyperlipidemia)     Kidney stone     Osteoarthritis of multiple joints 2017        Past Surgical History:   Procedure Laterality Date    CYSTOSCOPY URETEROSCOPY LASER LITHOTRIPSY Left 3/12/2016    Procedure: Cyto, Trans urethral left ureterral orifice incision, left ureteroscopy, holmium laser lithotripsy, basket extraction of stone fragment, placement of left double J ureteral stent. ;  Surgeon: Nathan Freeman Jr., MD;  Location: [facility] OR;  Service:     URETEROSCOPY LASER LITHOTRIPSY WITH STENT INSERTION Right 2025    Procedure: RIGHT URETEROSCOPY LASER LITHOTRIPSY STONE BASKET EXTRACTION STENT PLACEMENT;  Surgeon: Acosta Gongora MD;  Location: [facility] MAIN OR;  Service: Urology;  Laterality: Right;                        PT Assessment/Plan       Row Name 06/10/25 1123          PT Assessment    Assessment Comments Mr. Hannon overall continues to report resolution of dizziness, he did have brain MRI  and reported  roughly 2 hours after and the following morning he had a headache, primarily on L side temporal region. Discussed possible triggering event could be positioning during MRI. He has been using towel roll at base of neck when in bed/recliner in supine and has noted improved comfort/tolerance. Continued with manual interventions, added supine chin tuck + turn and standing open book with good tolerance. Pt. remains appropriate for skilled PT.  -MP        PT Plan    PT Plan Comments c-spine iso?  -MP               User Key  (r) = Recorded By, (t) = Taken By, (c) = Cosigned By      Initials Name Provider Type    Heydi Christopher, PT Physical Therapist                       OP Exercises       Row Name 06/10/25 1000             Subjective    Subjective Comments I had 1 funny day yesterday, about 6:00 Sunday after MRI I had a headache, took an excederin and went away. The next morning same thing and went away with excedrin. Maybe slight funny feeling. Dizziness is gone.  -MP         Subjective Pain    Able to rate subjective pain? yes  -MP      Pre-Treatment Pain Level 0  -MP      Post-Treatment Pain Level 0  -MP      Subjective Pain Comment 7-8/10 at highest with headache yesterday. No headache currently.  -MP         Total Minutes    51622 - PT Therapeutic Exercise Minutes 15  -MP      02371 - PT Manual Therapy Minutes 25  -MP         Exercise 1    Exercise Name 1 UBE  -MP      Cueing 1 Verbal  -MP      Time 1 5 min  -MP         Exercise 4    Exercise Name 4 supine chin tuck w/ towel roll  -MP      Cueing 4 Verbal;Demo  -MP      Reps 4 15  -MP      Time 4 5s  -MP         Exercise 7    Exercise Name 7 supine tuck + turn  -MP      Cueing 7 Verbal;Demo  -MP      Reps 7 10ea  -MP      Time 7 2-3s  -MP         Exercise 8    Exercise Name 8 standing open book  -MP      Cueing 8 Verbal;Demo  -MP      Reps 8 10e  -MP      Time 8 2-3s  -MP                User Key  (r) = Recorded By, (t) = Taken By, (c) = Cosigned By      Initials  Name Provider Type    Heydi Christopher, PT Physical Therapist                             Manual Rx (Last 36 Hours)       Manual Treatments       Row Name 06/10/25 1000 06/10/25 0900          Total Minutes    27263 - PT Manual Therapy Minutes 25  -MP --        Manual Rx 1    Manual Rx 1 Location seated and supine STM B UT/LS, cervical paraspinals, trigger point release, supine gentle cervical traction and suboccipital release  -MP --  -MP        Manual Rx 2    Manual Rx 2 Location B UT/SCM stretch [id number]  -MP --  -MP               User Key  (r) = Recorded By, (t) = Taken By, (c) = Cosigned By      Initials Name Provider Type    Heydi Christopher, PT Physical Therapist                     PT OP Goals       Row Name 06/10/25 1000          PT Short Term Goals    STG Date to Achieve 06/13/25  -MP     STG 1 Pt. will be (-) for BPPV in R posterior canal to reduce symptoms of dizziness.  -MP     STG 1 Progress Met  -MP        Long Term Goals    LTG Date to Achieve 07/13/25  -MP     LTG 1 Pt. will remain (-) for BPPV in all canals to maintain improved QOL.  -MP     LTG 1 Progress Ongoing  -MP     LTG 2 Pt. will report no headaches x1 week to improve QOL.  -MP     LTG 2 Progress Ongoing  -MP     LTG 3 Pt. will return to sleeping on side/back without symptom onset to improve sleep quality.  -MP     LTG 3 Progress Ongoing  -MP               User Key  (r) = Recorded By, (t) = Taken By, (c) = Cosigned By      Initials Name Provider Type    Heydi Christopher, PT Physical Therapist                    Therapy Education  Education Details: Updated HEP [id number]  Given: HEP, Symptoms/condition management  Program: Reinforced, Progressed  How Provided: Verbal, Demonstration, Written  Provided to: Patient  Level of Understanding: Verbalized, Demonstrated              Time Calculation:   Start Time: 1115  Stop Time: 1155  Time Calculation (min): 40 min  Total Timed Code Minutes- PT: 40 minute(s)  Timed Charges  77753 - PT  Therapeutic Exercise Minutes: 15  08205 - PT Manual Therapy Minutes: 25  Total Minutes  Timed Charges Total Minutes: 40   Total Minutes: 40  Therapy Charges for Today       Code Description Service Date Service Provider Modifiers Qty    [id number]  PT MANUAL THERAPY EA 15 MIN 6/10/2025 Heydi Patel, PT GP 2    [id number]  PT THER PROC EA 15 MIN 6/10/2025 Heydi Patel, PT GP 1                      Heydi Patel, PT  6/10/2025

## 2025-06-11 ENCOUNTER — PROCEDURE VISIT (OUTPATIENT)
Age: 75
End: 2025-06-11
Payer: MEDICARE

## 2025-06-11 VITALS — RESPIRATION RATE: 18 BRPM | TEMPERATURE: 97.5 F | BODY MASS INDEX: 27.17 KG/M2 | HEIGHT: 72 IN | WEIGHT: 200.6 LBS

## 2025-06-11 DIAGNOSIS — M16.11 PRIMARY OSTEOARTHRITIS OF RIGHT HIP: Primary | ICD-10-CM

## 2025-06-11 RX ORDER — LIDOCAINE HYDROCHLORIDE 10 MG/ML
2 INJECTION, SOLUTION EPIDURAL; INFILTRATION; INTRACAUDAL; PERINEURAL
Status: COMPLETED | OUTPATIENT
Start: 2025-06-11 | End: 2025-06-11

## 2025-06-11 RX ORDER — METHYLPREDNISOLONE ACETATE 80 MG/ML
80 INJECTION, SUSPENSION INTRA-ARTICULAR; INTRALESIONAL; INTRAMUSCULAR; SOFT TISSUE
Status: COMPLETED | OUTPATIENT
Start: 2025-06-11 | End: 2025-06-11

## 2025-06-11 RX ADMIN — METHYLPREDNISOLONE ACETATE 80 MG: 80 INJECTION, SUSPENSION INTRA-ARTICULAR; INTRALESIONAL; INTRAMUSCULAR; SOFT TISSUE at 09:46

## 2025-06-11 RX ADMIN — LIDOCAINE HYDROCHLORIDE 2 ML: 10 INJECTION, SOLUTION EPIDURAL; INFILTRATION; INTRACAUDAL; PERINEURAL at 09:46

## 2025-06-11 NOTE — PROGRESS NOTES
"Chief Complaint   Patient presents with    Right Hip - Follow-up     Right hip injection.        History of Present Illness  Jose Luis is a 74 y.o. year old male patient of Dr. Palacios with previously diagnosed osteoarthritis here today for intra-articular cortisone injection. Most recent injection on 5/9/24 provided good relief for roughly 3 months. Followed-up with Dr. Palacios on 2/13/25. No new injuries or complaints.    The following data was reviewed by: Barbara West DO on 05/09/2024:  Data reviewed : Dr. Palacios note from 5/15/25 and images from 2/13/25     Temp 97.5 °F (36.4 °C) (Temporal)   Resp 18   Ht 182.9 cm (72.01\")   Wt 91 kg (200 lb 9.6 oz)   BMI 27.20 kg/m²        Physical Exam  Vital signs reviewed.   General: Well developed, well nourished; No acute distress.  Eyes: conjunctiva clear; pupils equally round and reactive  ENT: external ears and nose atraumatic; hearing normal  CV: extremity warm and well perfused  Resp: normal respiratory effort, no use of accessory muscles  Skin: normal color and pigmentation; no rashes or wounds; normal turgor  Psych: alert and oriented; mood and affect appropriate; recent and remote memory intact    - Large Joint Arthrocentesis: R hip joint on 6/11/2025 9:46 AM  Indications: pain  Details: 22 G (3.5\") needle, ultrasound-guided anterior approach  Medications: 2 mL lidocaine PF 1% 1 %; 80 mg methylPREDNISolone acetate 80 MG/ML  Outcome: tolerated well, no immediate complications  Procedure, treatment alternatives, risks and benefits explained, specific risks discussed. Consent was given by the patient. Immediately prior to procedure a time out was called to verify the correct patient, procedure, equipment, support staff and site/side marked as required. Patient was prepped and draped in the usual sterile fashion.         Assessment and Plan  Diagnoses and all orders for this visit:    1. Primary osteoarthritis of right hip (Primary)  -     - Large Joint " Arthrocentesis    The procedure was well tolerated. He has been educated on the signs and symptoms of local reaction and infection and should call the office if he experiences any of these. He should take it easy for the next 24 to 48 hours and ice as needed. After that, he may return to normal activity. It was recommended to continue low impact activity. He may continue with previously recommended supportive measures. Will follow-up with Dr. Palacios as needed. All of his questions were answered and he is agreeable with the plan.    Dictated utilizing Dragon dictation.

## 2025-06-17 ENCOUNTER — HOSPITAL ENCOUNTER (OUTPATIENT)
Dept: PHYSICAL THERAPY | Facility: HOSPITAL | Age: 75
Setting detail: THERAPIES SERIES
Discharge: HOME | End: 2025-06-17
Payer: MEDICARE

## 2025-06-17 ENCOUNTER — PATIENT OUTREACH (OUTPATIENT)
Dept: CASE MANAGEMENT | Facility: OTHER | Age: 75
End: 2025-06-17
Payer: MEDICARE

## 2025-06-17 DIAGNOSIS — H81.10 BPPV (BENIGN PAROXYSMAL POSITIONAL VERTIGO), UNSPECIFIED LATERALITY: ICD-10-CM

## 2025-06-17 DIAGNOSIS — R42 VERTIGO: Primary | ICD-10-CM

## 2025-06-17 PROCEDURE — 97140 MANUAL THERAPY 1/> REGIONS: CPT

## 2025-06-17 PROCEDURE — 97110 THERAPEUTIC EXERCISES: CPT

## 2025-06-17 NOTE — THERAPY DISCHARGE NOTE
Outpatient Physical Therapy Vestibular Progress Note/Discharge Summary  [facility]     Patient Name: Jose Luis Davis  : 1950  MRN: [id number]  Today's Date: 2025      Visit Date: 2025    Visit Dx:     ICD-10-CM ICD-9-CM   1. Vertigo  R42 780.4   2. BPPV (benign paroxysmal positional vertigo), unspecified laterality  H81.10 386.11       Patient Active Problem List   Diagnosis    Ureterolithiasis    Benign prostatic hyperplasia with urinary hesitancy    Essential hypertension    Dyslipidemia    Osteoarthritis of multiple joints    Tension type headache    Migraine without aura    Vertigo    Benign paroxysmal positional vertigo    History of tobacco abuse    Elevated PSA    Need for immunization against influenza    Dyspnea    Medicare annual wellness visit, subsequent    Chest pain, atypical    Pulmonary nodule    Seasonal allergies    Lumbar back pain    Right hip pain                         PT Assessment/Plan       Row Name 25 1100          PT Assessment    Functional Limitations Impaired locomotion;Decreased safety during functional activities;Limitation in home management;Limitations in functional capacity and performance;Performance in leisure activities;Performance in self-care ADL  -MP     Impairments Balance;Pain;Poor body mechanics;Posture;Range of motion  -MP     Assessment Comments Jose Luis Davis was seen for 6 physical therapy sessions for BPPV with more recent visits addressing cervicogenic related dizziness/headaches. Treatment included therapeutic exercise, manual therapy, patient education with home exercise program , and vestibular rehab. Progress to physical therapy goals was good as pt. Met 1/1 STGs and 2/3 LTGs. He reports resolution of dizziness following treatment of BPPV, he has had significant improvement in headaches particularly with use of towel roll at base of c-spine when in bed/recliner as well as since initiating manual therapy and HEP to address  "muscular tension/referred pain. He continues with intermittent and self described \"funny feeling\" that he has been unable to identify any triggering/provoking factors. At this time based on improvement in dizziness and headaches and unchanged symptoms in relation to \"funny\" feeling elected to hold on further visits as progress likely maximized at this time, pt. In agreement. He was discharged to an independent [facility] and provided patient education to self-manage condition  -MP     Please refer to paper survey for additional self-reported information No  -MP     Rehab Potential Good  -MP     Patient/caregiver participated in establishment of treatment plan and goals Yes  -MP     Patient would benefit from skilled therapy intervention No  -MP        PT Plan    PT Frequency 1x/week  -MP     Predicted Duration of Therapy Intervention (PT) discharging  -MP     Planned CPT's? PT RE-EVAL: 06273;PT THER PROC EA 15 MIN: 29810;PT THER ACT EA 15 MIN: 03263;PT MANUAL THERAPY EA 15 MIN: 20157;PT NEUROMUSC RE-EDUCATION EA 15 MIN: 28437;PT GAIT TRAINING EA 15 MIN: 82794;PT SELF CARE/HOME MGMT/TRAIN EA 15: 15728;PT HOT OR COLD PACK TREAT MCARE;PT ELECTRICAL STIM UNATTEND: ;PT ULTRASOUND EA 15 MIN: 24490;PT TRACTION CERVICAL: 53696  -MP     PT Plan Comments DC  -MP               User Key  (r) = Recorded By, (t) = Taken By, (c) = Cosigned By      Initials Name Provider Type    Heydi Christopher, PT Physical Therapist                          OP Exercises       Row Name 06/17/25 1100             Subjective    Subjective Comments The headaches are better and dizziness is still better but the funny feeling still happens  -MP         Subjective Pain    Able to rate subjective pain? yes  -MP      Pre-Treatment Pain Level 0  -MP      Post-Treatment Pain Level 0  -MP         Total Minutes    39997 - PT Therapeutic Exercise Minutes 26  -MP      83144 - PT Manual Therapy Minutes 12  -MP         Exercise 1    Exercise Name 1 UBE  -MP      " Cueing 1 Verbal  -MP      Time 1 5 min  -MP         Exercise 2    Exercise Name 2 UT stretch  -MP      Cueing 2 Verbal;Demo  -MP      Reps 2 3e  -MP      Time 2 20s  -MP         Exercise 4    Exercise Name 4 supine chin tuck w/ towel roll  -MP      Cueing 4 Verbal;Demo  -MP      Reps 4 15  -MP      Time 4 5s  -MP         Exercise 8    Exercise Name 8 standing open book  -MP      Cueing 8 Verbal;Demo  -MP      Reps 8 10e  -MP      Time 8 2-3s  -MP                User Key  (r) = Recorded By, (t) = Taken By, (c) = Cosigned By      Initials Name Provider Type    MP Patkarriet Heydi, PT Physical Therapist                  Manual Rx (Last 36 Hours)       Manual Treatments       Row Name 06/17/25 1100             Total Minutes    04336 - PT Manual Therapy Minutes 12  -MP         Manual Rx 1    Manual Rx 1 Location seated and supine STM B UT/LS, cervical paraspinals, trigger point release, supine gentle cervical traction and suboccipital release  -MP         Manual Rx 2    Manual Rx 2 Location B UT/SCM stretch [id number]  -MP                User Key  (r) = Recorded By, (t) = Taken By, (c) = Cosigned By      Initials Name Provider Type    MP Patkarriet Heydi, PT Physical Therapist                                 PT OP Goals       Row Name 06/17/25 1100          PT Short Term Goals    STG Date to Achieve 06/13/25  -MP     STG 1 Pt. will be (-) for BPPV in R posterior canal to reduce symptoms of dizziness.  -MP     STG 1 Progress Met  -MP        Long Term Goals    LTG Date to Achieve 07/13/25  -MP     LTG 1 Pt. will remain (-) for BPPV in all canals to maintain improved QOL.  -MP     LTG 1 Progress Met  -MP     LTG 1 Progress Comments no dizziness  -MP     LTG 2 Pt. will report no headaches x1 week to improve QOL.  -MP     LTG 2 Progress Not Met  -MP     LTG 2 Progress Comments 4-5x/week but reduced from prior to beginning PT  -MP     LTG 3 Pt. will return to sleeping on side/back without symptom onset to improve sleep quality.   -MP     LTG 3 Progress Met  -MP     LTG 3 Progress Comments using towel as needed  -MP               User Key  (r) = Recorded By, (t) = Taken By, (c) = Cosigned By      Initials Name Provider Type    Heydi Christopher PT Physical Therapist                    Therapy Education  Given: Symptoms/condition management, Posture/body mechanics  Program: Reinforced  How Provided: Verbal, Demonstration  Provided to: Patient  Level of Understanding: Verbalized, Demonstrated              Time Calculation:   Start Time: 1115  Stop Time: 1153  Time Calculation (min): 38 min  Total Timed Code Minutes- PT: 38 minute(s)  Timed Charges  64414 - PT Therapeutic Exercise Minutes: 26  61402 - PT Manual Therapy Minutes: 12  Total Minutes  Timed Charges Total Minutes: 38   Total Minutes: 38     Therapy Charges for Today       Code Description Service Date Service Provider Modifiers Qty    80108397597 HC PT MANUAL THERAPY EA 15 MIN 6/17/2025 Heydi Patel, PT GP 1    [id number] HC PT THER PROC EA 15 MIN 6/17/2025 Heydi Patel PT GP 2                           Heydi Patel PT  6/17/2025

## 2025-06-17 NOTE — OUTREACH NOTE
AMBULATORY CASE MANAGEMENT NOTE    Names and Relationships of Patient/Support Persons: Contact: Jose Luis Davis; Relationship: Self -     Patient Outreach    Brief follow up call with the patient.  He has outpatient PT today.  He prefers a follow up in 2-3 weeks and this is scheduled. He denies any needs at this outreach.       Jacklyn VALDES  Ambulatory Case Management    6/17/2025, 10:20 EDT

## 2025-06-30 NOTE — PROGRESS NOTES
"Chief Complaint  Follow-up  Follow-up on carotid stenosis    Subjective        Jose Luishugh Davis presents to [facility] VASCULAR SURGERY  History of Present Illness  Mr Hannon is a 74-year-old gentleman with carotid artery occlusive disease.  He has been doing well with no focal neurologic problems or symptoms.  No amaurosis fugax, TIA, dysarthria of speech, hemiparesis or hemiplegia.  He remains on aspirin 81 mg a day and a statin agent.  He had vertigo in April 2023 and this resolved with vestibular therapy.  He was last seen on July 5, 2023 with a carotid duplex scan which demonstrated less than 50% stenosis bilaterally, unchanged.  He returned on July 9, 2025 and follow-up with carotid duplex scan.  This demonstrated less than 50% stenosis bilaterally, unchanged.  His biggest issue currently has to do with degenerative arthritis of the right hip and he may need right hip replacement in the very near future.    Past History:  Medical History: has a past medical history of Essential hypertension (04/28/2017), HLD (hyperlipidemia), Kidney stone, and Osteoarthritis of multiple joints (04/28/2017).   Surgical History: has a past surgical history that includes cystoscopy ureteroscopy laser lithotripsy (Left, 3/12/2016) and ureteroscopy laser lithotripsy with stent insertion (Right, 1/14/2025).   Family History: family history is not on file.   Social History: reports that he has quit smoking. He has never been exposed to tobacco smoke. He has never used smokeless tobacco. He reports current alcohol use of about 6.0 standard drinks of alcohol per week. He reports that he does not use drugs.    (Not in a hospital admission)     Allergies: Patient has no known allergies.   Objective   Vital Signs:  /83 (BP Location: Left arm, Patient Position: Sitting, Cuff Size: Adult)   Pulse 63   Temp 97.2 °F (36.2 °C) (Temporal)   Ht 182.9 cm (72.01\")   Wt 90.1 kg (198 lb 11.2 oz)   SpO2 97%   BMI 26.94 " "kg/m²   Estimated body mass index is 26.94 kg/m² as calculated from the following:    Height as of this encounter: 182.9 cm (72.01\").    Weight as of this encounter: 90.1 kg (198 lb 11.2 oz).          Jose Luis Davis  reports that he has quit smoking. He has never been exposed to tobacco smoke. He has never used smokeless tobacco.              Physical Exam   Result Review :        Data reviewed : Carotid duplex scan from July 5, 2023 and July 9, 2025, findings as above             Assessment and Plan     Diagnoses and all orders for this visit:    1. Bilateral carotid artery stenosis (Primary)  -     Duplex Carotid Ultrasound CAR; Future    2. Essential hypertension    He continues to do satisfactorily from a cerebrovascular standpoint with no progression of carotid stenosis, remaining very mild, and remaining asymptomatic.  Nothing further needed from this other than continued observation.  He is on aspirin and Crestor for which is good medical management.  If he needs total hip replacement his mild carotid stenosis would not put him at any elevated risk during that procedure.  He will not need a carotid duplex scan again for 2 years.         Follow Up     Return in about 2 years (around 7/9/2027) for With carotid duplex scan.  Patient was given instructions and counseling regarding his condition or for health maintenance advice. Please see specific information pulled into the AVS if appropriate.       "

## 2025-07-02 ENCOUNTER — OFFICE VISIT (OUTPATIENT)
Dept: NEUROLOGY | Facility: CLINIC | Age: 75
End: 2025-07-02
Payer: MEDICARE

## 2025-07-02 VITALS
DIASTOLIC BLOOD PRESSURE: 82 MMHG | SYSTOLIC BLOOD PRESSURE: 132 MMHG | HEIGHT: 72 IN | WEIGHT: 200 LBS | HEART RATE: 90 BPM | BODY MASS INDEX: 27.09 KG/M2 | OXYGEN SATURATION: 98 %

## 2025-07-02 DIAGNOSIS — H92.13 EAR DRAINAGE, BILATERAL: ICD-10-CM

## 2025-07-02 DIAGNOSIS — G44.229 CHRONIC TENSION-TYPE HEADACHE, NOT INTRACTABLE: ICD-10-CM

## 2025-07-02 DIAGNOSIS — M54.2 CERVICALGIA: ICD-10-CM

## 2025-07-02 DIAGNOSIS — R20.8 VIBRATION SENSORY LOSS: ICD-10-CM

## 2025-07-02 DIAGNOSIS — R42 VERTIGO: Primary | ICD-10-CM

## 2025-07-02 DIAGNOSIS — H93.19 TINNITUS, UNSPECIFIED LATERALITY: ICD-10-CM

## 2025-07-02 NOTE — PROGRESS NOTES
"[facility] Neurology  Patient ID: Jose Luis Davis  Age: 74 y.o.   Chief compliant:   Chief Complaint   Patient presents with    Headache       Initial HPI (7/2/2025):    Jose Luis Davis is a 74 y.o. male with HTN, HLD, BPH, osteoarthritis, BPPV, tension headaches who presents for evaluation of vertigo and headaches.  He has previously been seen by neurology with Renetta Gunn APRN, for cervicogenic headaches, last visit in March 2019.    Vertigo  Initially had severe vertigo 2 years ago upon waking up which resolved. This recently came back around the beginning of this year. If he turned quickly or looked up fast, he would get a sudden sensation of movement and had to steady himself. It would last about 10 seconds. He was taking meclizine as needed, but hasn't needed this. Just finished vestibular therapy, and this has improved. He checks his blood pressure daily, normal 110-120s with normal pulse.     Head sensation  He has a funny feeling in his head which began around the time he saw a previous neurologist in 2017.  This occurs about daily and is episodic. It is a tingling/numb sensation of the top of his head which is not uncomfortable, but concerning. Typically only lasts seconds. There is no clear trigger. Denies any associated dizziness or vertigo. It was severe enough that he went to the emergency department in March 2025 as it was lasting a long time, and was associated with a headache. It is not always associated with headache. He feels this has improved with PT as well, but continues.     Headaches  He has occasional headaches which are variable, 2-8/10 in intensity. Mild headaches can self resolve after 30 minutes or so. Pain occurs most often across the back of his head top of head, rarely right side behind his ear. This pain is a constant pain with some throbbing. He gets \"regular\" headaches across the front of the head which do not need treatment. Headaches improve with Excedrin, but at " times may improve on it's own and tries to avoid taking it. He has to take Excedrin 1-2/week. Denies disability with headaches and can function when intense. Worse when out in the sun for extended time.     He has tenderness to the back of his head which is worse with prolonged pressure on the back of his head. Therapy was also used to address neck pain which has helped. He has used a towel to rest under his head. Pain can shoot down the back of the head into the neck.    In 2017 with [facility], he had benefit from propranolol. Patient reports this was stopped due to cost. He has been maintained on CoQ10 and magnesium since that time.      Pertinent FHx/Social: retired, works on cars. He worked as a . Former smoker. Enjoys beer, but has reduced his intake with his dizziness (12 since last year)      Vitals:    07/02/25 1259   BP: 132/82   Pulse: 90   SpO2: 98%       The following portions of the patient's history were reviewed and updated as appropriate: allergies, current medications, past family history, past medical history, past social history, past surgical history and problem list.    Neurological Exam  Mental Status  Awake, alert and oriented to person, place and time. Recent and remote memory are intact. Speech is normal. Language is fluent with no aphasia. Attention and concentration are normal. Fund of knowledge is appropriate for level of education.    Cranial Nerves  CN II: Visual acuity is normal. Visual fields full to confrontation.  CN III, IV, VI: Extraocular movements intact bilaterally. Normal lids and orbits bilaterally. Pupils equal round and reactive to light bilaterally.  CN V: Facial sensation is normal.  CN VII: Full and symmetric facial movement.  CN IX, X: Palate elevates symmetrically  CN XI: Shoulder shrug strength is normal.  CN XII: Tongue midline without atrophy or fasciculations.    Motor  Normal muscle bulk throughout. Normal muscle tone. Strength is 5/5 in all four  extremities except as noted.    Sensory  Light touch is normal in upper and lower extremities. Pinprick is normal in upper and lower extremities. Vibration abnormality: Absent at toes. Diminished to midshin bilaterally.     Reflexes                                            Right                      Left  Brachioradialis                    2+                         2+  Biceps                                 2+                         2+  Triceps                                2+                         2+  Patellar                                2+                         2+  Achilles                                Tr                         Tr  Right Plantar: downgoing  Left Plantar: downgoing    Right pathological reflexes: Ankle clonus absent.  Left pathological reflexes: Ankle clonus absent.    Coordination    Finger-to-nose, rapid alternating movements and heel-to-shin normal bilaterally without dysmetria.    Gait  Casual gait is normal including stance, stride, and arm swing. Romberg is absent.    Imaging: Radiology report reviewed: MRI Brain 2025 reviewed  Labs reviewed including CMP, CBC    Assessment/Plan:    Jose Luis Davis is a 74 y.o. male who presents to establish care for multiple neurological concerns.    # BPPV  Brief positional vertigo with sudden movement that is transient and has responded well to Epley maneuvers most consistent with BPPV.  He is doing well with vestibular therapy.    # Chronic tension headaches  # Cervicalgia  Description of head pain has predominantly through the back of the head is most concerning for cervicogenic headaches.  Head pain is mostly tension-type and description and does not appear consistent with migraines.  He has previously had a good response to propranolol which can be considered in the future.  He was encouraged to continue physical therapy exercises for his neck.  He was additionally offered a muscle relaxer if needed, but declines wanting to be on any  additional medications.    # Head paresthesias  He presents with chronic, stable paresthesias to the top of the head which occur intermittently of uncertain etiology.  This may be related to cervicogenic pain.  He has had numerous MRIs of the brain which are normal.    # Vibratory loss  On examination, is noted to have prominent, reduced vibratory sensation in bilateral lower extremities with diminished ankle jerk.  He notes imbalance, but otherwise no obvious symptoms of neuropathy and no diminished sensation to any additional modalities.  Will confirm concerns for peripheral neuropathy with an EMG.     Diagnoses and all orders for this visit:    1. Vertigo (Primary)  -     Ambulatory Referral to ENT (Otolaryngology)    2. Vibration sensory loss  -     EMG & Nerve Conduction Test; Future  -     Vitamin B12  -     Folate  -     Immunofixation, Serum  -     Vitamin B6  -     Protein Elec + Interp, Serum  -     TSH    3. Ear drainage, bilateral  -     Ambulatory Referral to ENT (Otolaryngology)    4. Tinnitus, unspecified laterality  -     Ambulatory Referral to ENT (Otolaryngology)    5. Cervicalgia    6. Chronic tension-type headache, not intractable         Tito Guerin MD            I spent 51 minutes caring for this patient on this date of service. This time includes time spent by me in the following activities as necessary: preparing for the visit, reviewing tests, medical records and previous visits, obtaining and/or reviewing a separately obtained history, performing a medically appropriate exam and/or evaluation, counseling and educating the patient, and/or communicating with other healthcare professionals, documenting information in the medical record, independently interpreting results and communicating that information with the patient, and developing a medically appropriate treatment plan with consideration of other conditions, medications, and treatments.

## 2025-07-07 ENCOUNTER — PATIENT OUTREACH (OUTPATIENT)
Dept: CASE MANAGEMENT | Facility: OTHER | Age: 75
End: 2025-07-07
Payer: MEDICARE

## 2025-07-07 NOTE — OUTREACH NOTE
"AMBULATORY CASE MANAGEMENT NOTE    Names and Relationships of Patient/Support Persons: Contact: Uvaldodaniela, Jose Luis W; Relationship: Self -     Adult Patient Profile  Questions/Answers      Flowsheet Row Most Recent Value   Symptoms/Conditions Managed at Home musculoskeletal, neurological   Neurological Symptoms/Conditions vertigo, other (see comments)  [\"funny feeling in the top of my head that comes and goes\" neurology aware]   Neurological Comment tinnitis that he states his baseline if not improved   Taking Medications Not Prescribed yes, over-the-counter medicines, yes, vitamins and/or minerals   Vitamins/Minerals magnesium, multivitamin  [see med list that was reviewed]   Barriers to Taking Medication as Prescribed none          Patient Outreach    Follow up outreach with the patient.  Care plan reviewed and updated.  Discussed recent neurology visit and referral.  He will outreach to [facility] if he is not contacted in another week to schedule this referral.  He prefers an outreach for follow up for about 4 weeks.       Jacklyn VALDES  Ambulatory Case Management    7/7/2025, 17:14 EDT  "

## 2025-07-07 NOTE — PLAN OF CARE
Problem: General Plan of Care  Goal: Make and Keep All Appointments  Outcome: Progressing  Intervention: My Appointments To Do List  Description: Why is this important?Part of staying healthy is seeing the doctor for follow-up care.If you forget your appointments, there are some things you can do to stay on track.  Flowsheets (Taken 7/7/2025 1712)  My Appointments To Do List: keep a calendar with appointment dates  Goal: Protect My Health  Outcome: Progressing  Intervention: My Health Protection To Do List  Description: Why is this important?Screening tests can find diseases early when they are easier to treat.Your doctor or nurse will talk with you about which tests are important for you.Getting shots for common diseases like the flu and shingles will help prevent them.  Flowsheets (Taken 7/7/2025 1712)  My Health Protection To Do List: (conintues PT exercises at home)   schedule recommended health tests (blood work, mammogram, colonoscopy, pap test)   schedule and keep appointment for annual check-up  Goal: Manage My Emotions  Outcome: Progressing  Intervention: My Emotion Management To Do List  Description: Why is this important?When you are stressed, down or upset, your body reacts too.For example, your blood pressure may get higher; you may have a headache or stomachache.When your emotions get the best of you, your body's ability to fight off cold and flu gets weak.These steps will help you manage your emotions.  Flowsheets (Taken 7/7/2025 1712)  My Emotion Management To Do List: practice relaxation or meditation daily  Goal: Optimal Care Coordination  Outcome: Progressing  Intervention: Develop Relationship to Effect Behavior Change  Flowsheets (Taken 7/7/2025 1712)  Develop Relationship to Effect Behavior Change:   verbalization of feelings encouraged   self-reliance encouraged   reassurance provided   rapport fostered   questions encouraged   questions answered  Intervention: Mutually Develop and Foster  Achievement of Patient Goals  Flowsheets (Taken 7/7/2025 8612)  Mutually Develop and Foster Achievement of Patient Goals:   self-reliance encouraged   reassurance provided   questions answered   verbalization of feelings encouraged

## 2025-07-09 ENCOUNTER — HOSPITAL ENCOUNTER (OUTPATIENT)
Facility: HOSPITAL | Age: 75
Discharge: HOME | End: 2025-07-09
Admitting: SURGERY
Payer: MEDICARE

## 2025-07-09 ENCOUNTER — OFFICE VISIT (OUTPATIENT)
Age: 75
End: 2025-07-09
Payer: MEDICARE

## 2025-07-09 VITALS
DIASTOLIC BLOOD PRESSURE: 83 MMHG | HEART RATE: 63 BPM | WEIGHT: 198.7 LBS | OXYGEN SATURATION: 97 % | HEIGHT: 72 IN | SYSTOLIC BLOOD PRESSURE: 147 MMHG | TEMPERATURE: 97.2 F | BODY MASS INDEX: 26.91 KG/M2

## 2025-07-09 DIAGNOSIS — I10 ESSENTIAL HYPERTENSION: ICD-10-CM

## 2025-07-09 DIAGNOSIS — I65.23 BILATERAL CAROTID ARTERY STENOSIS: Primary | ICD-10-CM

## 2025-07-09 DIAGNOSIS — I65.23 BILATERAL CAROTID ARTERY STENOSIS: ICD-10-CM

## 2025-07-09 LAB
BH CV XLRA MEAS LEFT CAROTID BULB EDV: -24.3 CM/SEC
BH CV XLRA MEAS LEFT CAROTID BULB PSV: -79.2 CM/SEC
BH CV XLRA MEAS LEFT DIST CCA EDV: 31.7 CM/SEC
BH CV XLRA MEAS LEFT DIST CCA PSV: 97.5 CM/SEC
BH CV XLRA MEAS LEFT DIST ICA EDV: -40.5 CM/SEC
BH CV XLRA MEAS LEFT DIST ICA PSV: -89.4 CM/SEC
BH CV XLRA MEAS LEFT ICA/CCA RATIO: 0.95
BH CV XLRA MEAS LEFT MID CCA EDV: -23.6 CM/SEC
BH CV XLRA MEAS LEFT MID CCA PSV: -91.3 CM/SEC
BH CV XLRA MEAS LEFT MID ICA EDV: -40.4 CM/SEC
BH CV XLRA MEAS LEFT MID ICA PSV: -92.9 CM/SEC
BH CV XLRA MEAS LEFT PROX CCA EDV: 30.6 CM/SEC
BH CV XLRA MEAS LEFT PROX CCA PSV: 123.9 CM/SEC
BH CV XLRA MEAS LEFT PROX ECA EDV: -26.7 CM/SEC
BH CV XLRA MEAS LEFT PROX ECA PSV: -106.9 CM/SEC
BH CV XLRA MEAS LEFT PROX ICA EDV: -27.4 CM/SEC
BH CV XLRA MEAS LEFT PROX ICA PSV: -66.4 CM/SEC
BH CV XLRA MEAS LEFT PROX SCLA PSV: 170.7 CM/SEC
BH CV XLRA MEAS LEFT VERTEBRAL A EDV: -17.6 CM/SEC
BH CV XLRA MEAS LEFT VERTEBRAL A PSV: -60 CM/SEC
BH CV XLRA MEAS RIGHT CAROTID BULB EDV: -16.7 CM/SEC
BH CV XLRA MEAS RIGHT CAROTID BULB PSV: -68.5 CM/SEC
BH CV XLRA MEAS RIGHT DIST CCA EDV: -36.2 CM/SEC
BH CV XLRA MEAS RIGHT DIST CCA PSV: -96.6 CM/SEC
BH CV XLRA MEAS RIGHT DIST ICA EDV: -38.5 CM/SEC
BH CV XLRA MEAS RIGHT DIST ICA PSV: -84 CM/SEC
BH CV XLRA MEAS RIGHT ICA/CCA RATIO: 0.89
BH CV XLRA MEAS RIGHT MID CCA EDV: 31.5 CM/SEC
BH CV XLRA MEAS RIGHT MID CCA PSV: 98.1 CM/SEC
BH CV XLRA MEAS RIGHT MID ICA EDV: -37.7 CM/SEC
BH CV XLRA MEAS RIGHT MID ICA PSV: -86.1 CM/SEC
BH CV XLRA MEAS RIGHT PROX CCA EDV: -25.9 CM/SEC
BH CV XLRA MEAS RIGHT PROX CCA PSV: -114.5 CM/SEC
BH CV XLRA MEAS RIGHT PROX ECA EDV: -30.1 CM/SEC
BH CV XLRA MEAS RIGHT PROX ECA PSV: -105.1 CM/SEC
BH CV XLRA MEAS RIGHT PROX ICA EDV: -31.9 CM/SEC
BH CV XLRA MEAS RIGHT PROX ICA PSV: -72.7 CM/SEC
BH CV XLRA MEAS RIGHT PROX SCLA PSV: 117.2 CM/SEC
BH CV XLRA MEAS RIGHT VERTEBRAL A EDV: 17.5 CM/SEC
BH CV XLRA MEAS RIGHT VERTEBRAL A PSV: 49 CM/SEC
LEFT ARM BP: NORMAL MMHG
RIGHT ARM BP: NORMAL MMHG

## 2025-07-09 PROCEDURE — 93880 EXTRACRANIAL BILAT STUDY: CPT

## 2025-07-21 DIAGNOSIS — E78.5 DYSLIPIDEMIA: ICD-10-CM

## 2025-07-21 RX ORDER — ROSUVASTATIN CALCIUM 40 MG/1
40 TABLET, COATED ORAL DAILY
Qty: 90 TABLET | Refills: 2 | Status: SHIPPED | OUTPATIENT
Start: 2025-07-21

## 2025-07-29 ENCOUNTER — HOSPITAL ENCOUNTER (OUTPATIENT)
Dept: NEUROLOGY | Facility: HOSPITAL | Age: 75
Discharge: HOME | End: 2025-07-29
Payer: MEDICARE

## 2025-07-29 DIAGNOSIS — R20.8 VIBRATION SENSORY LOSS: ICD-10-CM

## 2025-07-29 NOTE — PROCEDURES
"EMG and Nerve Conduction Studies    I.      Instrument used: [place] [employer] [place]  II.     Please see data sheets for tabular summary of NCS and details on methods, temperatures and lab standards.   III.    EMG muscles tested for upper extremity studies include the deltoid, biceps, triceps, pronator teres, extensor digitorum communis, first dorsal interosseous and abductor pollicis brevis.    IV.   EMG muscles tested for lower extremity studies include the vastus lateralis, tibialis anterior, peroneus longus, medial gastrocnemius and extensor digitorum brevis.    V.    Additional muscles tested as needed.  Paraspinal muscles tested as needed.   VI.   Please see data sheets for tabular summary of EMG findings.   VII. The complete report includes the data sheets.      Indication: neuropathy  History: No particular complaints in the legs or feet. He does note an unusual sensation at the bottom of his legs and cramping pains with overactivity. He can be off balance. He uses a cane regarding right hip pain for which he uses a cane.      Ht: 72\"  Wt: 198 lb  HbA1C: No results found for: \"HGBA1C\"  TSH:   Lab Results   Component Value Date    TSH 1.540 09/29/2023       Technical summary: Nerve conduction studies were obtained of the LLE with one comparison on the right. Skin temperatures were normal and temperature correction was used where indicated. Needle examination was obtained on selected muscles in the LLE.    Results:  Normal left sural sensory response  Abnormal right superficial peroneal sensory response with low amplitude and normal peak latency.  Comparison done of the right lower extremity with essentially no response detected.  Abnormal left tibial motor response with low amplitude and conduction velocity of 36 m/s above the fibular head  Abnormal left tibial motor response with low amplitude and borderline conduction velocity of 39 m/s  Needle examination of the left lower extremity is noted for large " amplitude motor unit action potentials with increased firing rate and reduced recruitment pattern of the left tibialis anterior, peroneus longus, right gastrocnemius medial head.  Amplitudes were borderline, but normal of the vastus lateralis.  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  No radiculopathy, plexopathy, or myopathy.  Study results discussed with the patient.    Tito Guerin M.D.    Note: Neuropathy labs still need to be completed from previous appointment.  He was encouraged to do so.  Reports he regularly drink alcohol, but has minimized use significantly in the last year.      Dictated utilizing Dragon dictation.

## 2025-08-05 LAB
ALBUMIN SERPL ELPH-MCNC: 4.4 G/DL (ref 2.9–4.4)
ALBUMIN/GLOB SERPL: 1.2 {RATIO} (ref 0.7–1.7)
ALPHA1 GLOB SERPL ELPH-MCNC: 0.3 G/DL (ref 0–0.4)
ALPHA2 GLOB SERPL ELPH-MCNC: 0.8 G/DL (ref 0.4–1)
B-GLOBULIN SERPL ELPH-MCNC: 1.2 G/DL (ref 0.7–1.3)
FOLATE SERPL-MCNC: >20 NG/ML
GAMMA GLOB SERPL ELPH-MCNC: 1.4 G/DL (ref 0.4–1.8)
GLOBULIN SER CALC-MCNC: 3.6 G/DL (ref 2.2–3.9)
IGA SERPL-MCNC: 334 MG/DL (ref 61–437)
IGG SERPL-MCNC: 1501 MG/DL (ref 603–1613)
IGM SERPL-MCNC: 45 MG/DL (ref 15–143)
LABORATORY COMMENT REPORT: NORMAL
M PROTEIN SERPL ELPH-MCNC: NORMAL G/DL
PROT PATTERN SERPL ELPH-IMP: NORMAL
PROT PATTERN SERPL IFE-IMP: NORMAL
PROT SERPL-MCNC: 8 G/DL (ref 6–8.5)
PYRIDOXAL PHOS SERPL-MCNC: 27.8 UG/L (ref 3.4–65.2)
TSH SERPL DL<=0.005 MIU/L-ACNC: 1.71 UIU/ML (ref 0.45–4.5)
VIT B12 SERPL-MCNC: 669 PG/ML (ref 232–1245)

## 2025-08-06 ENCOUNTER — RESULTS FOLLOW-UP (OUTPATIENT)
Dept: NEUROLOGY | Facility: CLINIC | Age: 75
End: 2025-08-06
Payer: MEDICARE

## (undated) DEVICE — JELLY,LUBE,STERILE,FLIP TOP,TUBE,4-OZ: Brand: MEDLINE

## (undated) DEVICE — SYR LUERLOK 20CC BX/50

## (undated) DEVICE — PRT BIOP SEALS

## (undated) DEVICE — SOL IRR H2O BO 1000ML STRL

## (undated) DEVICE — PK URETSCP 40

## (undated) DEVICE — NITINOL STONE RETRIEVAL BASKET: Brand: ZERO TIP

## (undated) DEVICE — GLV SURG SENSICARE PI MIC PF SZ6.5 LF STRL

## (undated) DEVICE — 4-PORT MANIFOLD: Brand: NEPTUNE 2

## (undated) DEVICE — URETERAL DILATATION SYSTEM

## (undated) DEVICE — FIBR LASR MOSES 365 DFL 6J 80HZ 120W

## (undated) DEVICE — GOWN,SIRUS,NON REINFRCD,LARGE,SET IN SL: Brand: MEDLINE

## (undated) DEVICE — PAD FLR QUICKSUITE ABS IMPERV 46X40IN

## (undated) DEVICE — BG TRAP DRN UROL OPN/MESH/FLTR 163X2CM 51CM/COMPR DISP STRL

## (undated) DEVICE — TOWEL,OR,DSP,ST,BLUE,STD,4/PK,20PK/CS: Brand: MEDLINE

## (undated) DEVICE — GLV SURG BIOGEL LTX PF 8

## (undated) DEVICE — GLV SURG SENSICARE POLYISPRN W/ALOE PF LF 6.5 GRN STRL

## (undated) DEVICE — NITINOL WIRE WITH HYDROPHILIC TIP: Brand: SENSOR

## (undated) DEVICE — SYS IRR PUMP SGL ACTN VAC SYR 10CC